# Patient Record
Sex: FEMALE | Race: WHITE | NOT HISPANIC OR LATINO | Employment: FULL TIME | URBAN - METROPOLITAN AREA
[De-identification: names, ages, dates, MRNs, and addresses within clinical notes are randomized per-mention and may not be internally consistent; named-entity substitution may affect disease eponyms.]

---

## 2017-03-01 ENCOUNTER — OFFICE VISIT (OUTPATIENT)
Dept: FAMILY MEDICINE | Facility: CLINIC | Age: 22
End: 2017-03-01
Payer: COMMERCIAL

## 2017-03-01 VITALS
BODY MASS INDEX: 24.37 KG/M2 | HEART RATE: 96 BPM | WEIGHT: 142 LBS | TEMPERATURE: 98.9 F | OXYGEN SATURATION: 99 % | DIASTOLIC BLOOD PRESSURE: 77 MMHG | SYSTOLIC BLOOD PRESSURE: 131 MMHG | RESPIRATION RATE: 16 BRPM

## 2017-03-01 DIAGNOSIS — R19.7 VOMITING AND DIARRHEA: ICD-10-CM

## 2017-03-01 DIAGNOSIS — R10.31 ABDOMINAL PAIN, RIGHT LOWER QUADRANT: Primary | ICD-10-CM

## 2017-03-01 DIAGNOSIS — R11.10 VOMITING AND DIARRHEA: ICD-10-CM

## 2017-03-01 PROCEDURE — 99214 OFFICE O/P EST MOD 30 MIN: CPT | Performed by: FAMILY MEDICINE

## 2017-03-01 ASSESSMENT — PAIN SCALES - GENERAL: PAINLEVEL: MILD PAIN (3)

## 2017-03-01 NOTE — MR AVS SNAPSHOT
"              After Visit Summary   3/1/2017    Maria Del Rosario Barger    MRN: [id number]           Patient Information     Date Of Birth          1995        Visit Information        Provider Department      3/1/2017 1:00 PM Gabriela Stephen MD [facility]        Today's Diagnoses     Abdominal pain, right lower quadrant    -  1    Vomiting and diarrhea           Follow-ups after your visit        Who to contact     If you have questions or need follow up information about today's clinic visit or your schedule please contact [facility] directly at [phone].  Normal or non-critical lab and imaging results will be communicated to you by gis.tohart, letter or phone within 4 business days after the clinic has received the results. If you do not hear from us within 7 days, please contact the clinic through Flexenclosuret or phone. If you have a critical or abnormal lab result, we will notify you by phone as soon as possible.  Submit refill requests through T[employer] or call your pharmacy and they will forward the refill request to us. Please allow 3 business days for your refill to be completed.          Additional Information About Your Visit        MyChart Information     [employer] lets you send messages to your doctor, view your test results, renew your prescriptions, schedule appointments and more. To sign up, go to [email or web address] . Click on \"Log in\" on the left side of the screen, which will take you to the Welcome page. Then click on \"Sign up Now\" on the right side of the page.     You will be asked to enter the access code listed below, as well as some personal information. Please follow the directions to create your username and password.     Your access code is: [email or web address]  Expires: 2017  9:37 AM     Your access code will  in 90 days. If you need help or a new code, please call your [facility] or [phone].        Care EveryWhere ID     This is your Care " EveryWhere ID. This could be used by other organizations to access your [place] medical records  [id number]        Your Vitals Were     Pulse Temperature Respirations Pulse Oximetry Breastfeeding? BMI (Body Mass Index)    96 98.9  F (37.2  C) (Oral) 16 99% No 24.37 kg/m2       Blood Pressure from Last 3 Encounters:   03/01/17 131/77   11/14/16 125/80   11/07/16 126/70    Weight from Last 3 Encounters:   03/01/17 142 lb (64.4 kg)   11/25/16 155 lb (70.3 kg)   11/14/16 155 lb (70.3 kg)              Today, you had the following     No orders found for display       Primary Care Provider Office Phone # Fax #    [facility] [phone] [address] [facility] [address]        Thank you!     Thank you for choosing [facility]  for your care. Our goal is always to provide you with excellent care. Hearing back from our patients is one way we can continue to improve our services. Please take a few minutes to complete the written survey that you may receive in the mail after your visit with us. Thank you!             Your Updated Medication List - Protect others around you: Learn how to safely use, store and throw away your medicines at www.disposemymeds.org.          This list is accurate as of: 3/1/17  2:26 PM.  Always use your most recent med list.                   Brand Name Dispense Instructions for use    * albuterol 108 (90 BASE) MCG/ACT Inhaler    PROAIR HFA/PROVENTIL HFA/VENTOLIN HFA    1 Inhaler    Inhale 2 puffs into the lungs every 4 hours as needed for shortness of breath / dyspnea or wheezing Use with spacer       * albuterol 108 (90 BASE) MCG/ACT Inhaler    PROAIR HFA/PROVENTIL HFA/VENTOLIN HFA    1 Inhaler    Inhale 2 puffs into the lungs every 4 hours as needed       * Notice:  This list has 2 medication(s) that are the same as other medications prescribed for you. Read the directions carefully, and ask your doctor or other care provider to review them  with you.

## 2017-03-01 NOTE — PROGRESS NOTES
SUBJECTIVE:                                                    Maria Del Rosario Barger is a 21 year old female who presents to clinic today for the following health issues:      Abdominal Pain      Duration: 2-3 days    Description (location/character/radiation): upper abdominal/ nausea and cramping       Associated flank pain: NO    Intensity:  mild    Accompanying signs and symptoms:        Fever/Chills: YES       Gas/Bloating: YES       Nausea/vomitting: YES       Diarrhea: YES       Dysuria or Hematuria: no     History (previous similar pain/trauma/previous testing): yes, same sx about 3 weeks ago    Precipitating or alleviating factors:       Pain worse with eating/BM/urination: none       Pain relieved by BM: no     Therapies tried and outcome: None    LMP:  Irregular periods unsure. Not on birth control.      Patient has had her gallbladder out and since then would have some occasional looses stools.  Twice in the last 3 weeks has had episodes of fever and bad upper abdominal pain cramps and bloating. Patient would then lay in bed and have no energy.  For the first 12 hours would have fever chills and just resting and the whole next day would be either throwing up or having bad diarrhea.  The pain would last for a couple of days then symptoms resolved.    This second bout started last night had a fever 101.4 had chills body aches had to leave work early  Laid down in bed had severe upper abdominal pain. Was freezing had to take ibuprofen  Patient was able to sleep but yesterday still had abdominal pain then later that day started having diarrhea all afternoon every hour all afternoon and vomiting (3x)   Last episode of diarrhea was today at 11:30 am today symptoms are slowing down somewhat. Haven't thrown up today.  Bloody Diarrhea: No  Recent antibiotic use:No  Recent travel:No  Known ill contacts or exposure to c.diff:No  Eating any raw or undercooked foods:No  Lightheadedness, syncope, or presyncope:No    No  chest pain or shortness of breath or neck stiffness    Problem list and histories reviewed & adjusted, as indicated.  Additional history: as documented    Problem list, Medication list, Allergies, and Medical/Social/Surgical histories reviewed in [facility] and updated as appropriate.    ROS:  Constitutional, HEENT, cardiovascular, pulmonary, gi and gu systems are negative, except as otherwise noted.    OBJECTIVE:                                                    /77  Pulse 96  Temp 98.9  F (37.2  C) (Oral)  Resp 16  Wt 142 lb (64.4 kg)  SpO2 99%  Breastfeeding? No  BMI 24.37 kg/m2  Body mass index is 24.37 kg/(m^2).  Orthostatic: No  GENERAL: healthy, alert and no distress  NECK: no adenopathy, no asymmetry, masses, or scars and thyroid normal to palpation  RESP: lungs clear to auscultation - no rales, rhonchi or wheezes  CV: regular rate and rhythm, normal S1 S2, no S3 or S4, no murmur, click or rub, no peripheral edema and peripheral pulses strong  ABDOMEN: soft BUT VERY TENDER POSITIVE right lower quadrant  abdominal tenderness,  no hepatosplenomegaly, no masses and bowel sounds normal  MS: no gross musculoskeletal defects noted, no edema    Diagnostic Test Results:  No results found for this or any previous visit (from the past 24 hour(s)).     ASSESSMENT/PLAN:                                                        ICD-10-CM    1. Abdominal pain, right lower quadrant R10.31    2. Vomiting and diarrhea R11.10     R19.7        Patient was exquisitely tender and complained of severe pain and tenderness on palpating right lower quadrant  Recommend ER evaluation.  Transferred to ER for further evaluation and management because of right lower quadrant abdominal pain.   Patient plans to go to [facility] but not right away.  Discussed risks of acute abdomen and perforation.  Patient was recommended ambulance transfer, patient declined        MD Gabriela Saez MD  [facility]  [place]

## 2017-03-01 NOTE — NURSING NOTE
"Chief Complaint   Patient presents with     Abdominal Pain     c/o abdominal pain, vomiting and diarrghea x 3 days       Initial /77  Pulse 96  Temp 98.9  F (37.2  C) (Oral)  Resp 16  Wt 142 lb (64.4 kg)  SpO2 99%  Breastfeeding? No  BMI 24.37 kg/m2 Estimated body mass index is 24.37 kg/(m^2) as calculated from the following:    Height as of 11/25/16: 5' 4\" (1.626 m).    Weight as of this encounter: 142 lb (64.4 kg).  Medication Reconciliation: complete   Winnie Gill MA      "

## 2017-08-29 ENCOUNTER — OFFICE VISIT (OUTPATIENT)
Dept: OBGYN | Facility: CLINIC | Age: 22
End: 2017-08-29
Payer: COMMERCIAL

## 2017-08-29 VITALS
HEART RATE: 82 BPM | TEMPERATURE: 98.1 F | HEIGHT: 63 IN | BODY MASS INDEX: 25.69 KG/M2 | WEIGHT: 145 LBS | SYSTOLIC BLOOD PRESSURE: 125 MMHG | DIASTOLIC BLOOD PRESSURE: 78 MMHG

## 2017-08-29 DIAGNOSIS — Z11.3 SCREENING EXAMINATION FOR VENEREAL DISEASE: ICD-10-CM

## 2017-08-29 DIAGNOSIS — N89.8 VAGINAL DISCHARGE: ICD-10-CM

## 2017-08-29 DIAGNOSIS — Z01.419 ENCOUNTER FOR GYNECOLOGICAL EXAMINATION WITHOUT ABNORMAL FINDING: Primary | ICD-10-CM

## 2017-08-29 LAB
SPECIMEN SOURCE: NORMAL
WET PREP SPEC: NORMAL

## 2017-08-29 PROCEDURE — 87591 N.GONORRHOEAE DNA AMP PROB: CPT | Performed by: NURSE PRACTITIONER

## 2017-08-29 PROCEDURE — 87210 SMEAR WET MOUNT SALINE/INK: CPT | Performed by: NURSE PRACTITIONER

## 2017-08-29 PROCEDURE — 99395 PREV VISIT EST AGE 18-39: CPT | Performed by: NURSE PRACTITIONER

## 2017-08-29 PROCEDURE — 87491 CHLMYD TRACH DNA AMP PROBE: CPT | Performed by: NURSE PRACTITIONER

## 2017-08-29 ASSESSMENT — PAIN SCALES - GENERAL: PAINLEVEL: NO PAIN (0)

## 2017-08-29 NOTE — LETTER
[facility]  [address] [facility] [address]  Phone: [phone]    08/30/17    Maria Del Rosario Barger  [address]   [place] [address]              Maria Del Rosario,     Your labs are negative for infection. Please follow up as needed.     Sincerely,     Lydia Lucio CNP

## 2017-08-29 NOTE — MR AVS SNAPSHOT
"              After Visit Summary   2017    Maria Del Rosario Barger    MRN: [id number]           Patient Information     Date Of Birth          1995        Visit Information        Provider Department      2017 10:50 AM Lydia Lucio APRN CNP [facility]        Today's Diagnoses     Encounter for gynecological examination without abnormal finding    -  1    Screening examination for venereal disease        Vaginal discharge           Follow-ups after your visit        Who to contact     If you have questions or need follow up information about today's clinic visit or your schedule please contact [facility] directly at [phone].  Normal or non-critical lab and imaging results will be communicated to you by [facility], letter or phone within 4 business days after the clinic has received the results. If you do not hear from us within 7 days, please contact the clinic through [employer] or phone. If you have a critical or abnormal lab result, we will notify you by phone as soon as possible.  Submit refill requests through [employer] or call your pharmacy and they will forward the refill request to us. Please allow 3 business days for your refill to be completed.          Additional Information About Your Visit        MyChart Information     [employer] lets you send messages to your doctor, view your test results, renew your prescriptions, schedule appointments and more. To sign up, go to [email or web address] . Click on \"Log in\" on the left side of the screen, which will take you to the Welcome page. Then click on \"Sign up Now\" on the right side of the page.     You will be asked to enter the access code listed below, as well as some personal information. Please follow the directions to create your username and password.     Your access code is: [email or web address]  Expires: 2017 11:31 AM     Your access code will  in 90 days. If you need help or a new code, please call your " "[facility] or [phone].        Care EveryWhere ID     This is your Care EveryWhere ID. This could be used by other organizations to access your [place] medical records  [id number]        Your Vitals Were     Pulse Temperature Height Last Period BMI (Body Mass Index)       82 98.1  F (36.7  C) (Oral) 5' 3.25\" (1.607 m) 07/31/2017 (Approximate) 25.48 kg/m2        Blood Pressure from Last 3 Encounters:   08/29/17 125/78   03/01/17 131/77   11/14/16 125/80    Weight from Last 3 Encounters:   08/29/17 145 lb (65.8 kg)   03/01/17 142 lb (64.4 kg)   11/25/16 155 lb (70.3 kg)              We Performed the Following     Chlamydia trachomatis PCR     Neisseria gonorrhoeae PCR     Wet prep          Today's Medication Changes          These changes are accurate as of: 8/29/17 11:31 AM.  If you have any questions, ask your nurse or doctor.               These medicines have changed or have updated prescriptions.        Dose/Directions    albuterol 108 (90 BASE) MCG/ACT Inhaler   Commonly known as:  PROAIR HFA/PROVENTIL HFA/VENTOLIN HFA   This may have changed:  Another medication with the same name was removed. Continue taking this medication, and follow the directions you see here.   Used for:  Acute bronchitis, unspecified organism   Changed by:  Gabriela Stephen MD        Dose:  2 puff   Inhale 2 puffs into the lungs every 4 hours as needed for shortness of breath / dyspnea or wheezing Use with spacer   Quantity:  1 Inhaler   Refills:  0                Primary Care Provider Office Phone # Fax #    [facility] [phone] [address] [facility] [facility] [address]        Equal Access to Services     RM ALFORD AH: Yuval Thapa, mary lutatianaadaha, qajeffta kaalmada dena, faith hamm. So [facility] [phone].    ATENCIÓN: Si habla español, tiene a coleman disposición servicios gratuitos de asistencia lingüística. Llame al [phone].    We " comply with applicable federal civil rights laws and [place] laws. We do not discriminate on the basis of race, color, national origin, age, disability sex, sexual orientation or gender identity.            Thank you!     Thank you for choosing [facility]  [facility]. Our goal is always to provide you with excellent care. Hearing back from our patients is one way we can continue to improve our services. Please take a few minutes to complete the written survey that you may receive in the mail after your visit with us. Thank you!             Your Updated Medication List - Protect others around you: Learn how to safely use, store and throw away your medicines at www.disposemymeds.org.          This list is accurate as of: 8/29/17 11:31 AM.  Always use your most recent med list.                   Brand Name Dispense Instructions for use Diagnosis    albuterol 108 (90 BASE) MCG/ACT Inhaler    PROAIR HFA/PROVENTIL HFA/VENTOLIN HFA    1 Inhaler    Inhale 2 puffs into the lungs every 4 hours as needed for shortness of breath / dyspnea or wheezing Use with spacer    Acute bronchitis, unspecified organism

## 2017-08-29 NOTE — NURSING NOTE
"Chief Complaint   Patient presents with     Physical       Initial /78  Pulse 82  Temp 98.1  F (36.7  C) (Oral)  Ht 5' 3.25\" (1.607 m)  Wt 145 lb (65.8 kg)  LMP 07/31/2017 (Approximate)  BMI 25.48 kg/m2 Estimated body mass index is 25.48 kg/(m^2) as calculated from the following:    Height as of this encounter: 5' 3.25\" (1.607 m).    Weight as of this encounter: 145 lb (65.8 kg)..  BP completed using cuff size: franki Damon CMA      "

## 2017-08-29 NOTE — PROGRESS NOTES
S: Pt is a 22 year old  2 para 1 who presents today for an annual female exam. LMP: 17. Contraception: none. Requests STD screening. Last Pap smear: 2016 and was NIL. Immunizations reviewed. Dental Exams: regular. Diet and calcium reviewed. Exercise: active.     Past Medical History:   Diagnosis Date     NO ACTIVE PROBLEMS      Past Surgical History:   Procedure Laterality Date     CHOLECYSTOSTOMY  2016     COMBINED ESOPHAGOSCOPY, GASTROSCOPY, DUODENOSCOPY (EGD) WITH CO2 INSUFFLATION Bilateral 2016    Procedure: COMBINED ESOPHAGOSCOPY, GASTROSCOPY, DUODENOSCOPY (EGD) WITH CO2 INSUFFLATION;  Surgeon: Warren Fernandez MD;  Location: [facility] OR     ESOPHAGOSCOPY, GASTROSCOPY, DUODENOSCOPY (EGD), COMBINED N/A 2016    Procedure: COMBINED ESOPHAGOSCOPY, GASTROSCOPY, DUODENOSCOPY (EGD), BIOPSY SINGLE OR MULTIPLE;  Surgeon: Warren Fernandez MD;  Location: [facility] OR     TONSILLECTOMY       trigger thumb       Social History   Substance Use Topics     Smoking status: Current Every Day Smoker     Packs/day: 1.00     Types: Cigarettes     Smokeless tobacco: Never Used     Alcohol use Yes      Comment: occasional          REVIEW OF SYSTEMS:  CONSTITUTIONAL:NEGATIVE for fever, chills, change in weight  EYES: NEGATIVE for vision changes or irritation  ENT/MOUTH: NEGATIVE for ear, mouth and throat problems  RESP:NEGATIVE for significant cough or SOB  CV: NEGATIVE for chest pain, palpitations or peripheral edema  GI: NEGATIVE for nausea, abdominal pain, heartburn, or change in bowel habits  Periods are regular, Cyclic symptoms include none. No intermenstrual bleeding. Complains of a thick white vaginal discharge x 3 weeks. No itching, odor, abnormal urinary symptoms.   MUSCULOSKELATAL:NEGATIVE for significant arthralgias or myalgia  INTEGUMENTARY/SKIN: NEGATIVE for worrisome rashes, moles or lesions  NEURO: NEGATIVE for weakness, dizziness or paresthesias  ENDOCRINE: NEGATIVE for temperature  intolerance, skin/hair changes  HEME/ALLERGY/IMMUNE: NEGATIVE for bleeding problems  PSYCHIATRIC: NEGATIVE for changes in mood or affect      OBJECTIVE: This is a well appearing female in no acute distress. Answers questions and maintains eye contact appropriately. Vital signs noted.     EXAM:  EYES: Eyes grossly normal to inspection, PERRL and conjunctivae and sclerae normal  HENT: ear canals and TM's normal and nose and mouth without ulcers or lesions  NECK: no adenopathy, no asymmetry, masses, or scars and thyroid normal to palpation  RESP: lungs clear to auscultation - no rales, rhonchi or wheezes  CV: regular rates and rhythm, normal S1 S2, no S3 or S4 and no murmur, click or rub  LYMPH: normal ant/post cervical and supraclavicular nodes  ABD/GI: soft, nontender, without hepatosplenomegaly or masses  MS: extremities normal- no gross deformities noted  SKIN: no suspicious lesions or rashes  NEURO: Normal strength and tone, mentation intact and speech normal  PSYCH: mentation appears normal and affect normal/bright  Breast exam: Breasts are symmetrical without masses, lymphadenopathy, retraction, dimpling, or nipple discharge bilaterally.  Pelvic Exam: External genitalia without visible lesions or discharge. Normal BUS. Vaginal mucosa pink, rugated, moist, without lesions or discharge. Cervix is pink, multiparous, midline, without cervical motion tenderness. Pap smear is not obtained. Uterus normal size and shape without tenderness or masses. Adnexa without masses or tenderness bilaterally.  Wet prep negative    A/P:  1) Normal annual female exam. Health maintenance updated. Regular physical activity and healthy diet encouraged. Continue regular dental exams. Encouraged adequate calcium intake. New ACOG guidelines regarding cervical cancer screening discussed with patient. Discussed rationale for not doing pap smear annually as she is not high risk. Based on last pap smear, she is not due for pap smear this year.    2) STD screening. Labs obtained and we will notify patient of results when available.    Lydia LYNNE CNP

## 2017-08-30 LAB
C TRACH DNA SPEC QL NAA+PROBE: NEGATIVE
N GONORRHOEA DNA SPEC QL NAA+PROBE: NEGATIVE
SPECIMEN SOURCE: NORMAL
SPECIMEN SOURCE: NORMAL

## 2017-10-13 ENCOUNTER — OFFICE VISIT (OUTPATIENT)
Dept: FAMILY MEDICINE | Facility: CLINIC | Age: 22
End: 2017-10-13
Payer: COMMERCIAL

## 2017-10-13 VITALS
OXYGEN SATURATION: 98 % | SYSTOLIC BLOOD PRESSURE: 122 MMHG | HEART RATE: 89 BPM | DIASTOLIC BLOOD PRESSURE: 78 MMHG | WEIGHT: 145 LBS | TEMPERATURE: 97.7 F | BODY MASS INDEX: 25.48 KG/M2 | RESPIRATION RATE: 15 BRPM

## 2017-10-13 DIAGNOSIS — G44.59 OTHER COMPLICATED HEADACHE SYNDROME: Primary | ICD-10-CM

## 2017-10-13 DIAGNOSIS — H53.9 VISION CHANGES: ICD-10-CM

## 2017-10-13 PROCEDURE — 99215 OFFICE O/P EST HI 40 MIN: CPT | Performed by: PHYSICIAN ASSISTANT

## 2017-10-13 RX ORDER — PREDNISOLONE ACETATE 10 MG/ML
SUSPENSION/ DROPS OPHTHALMIC
Refills: 0 | COMMUNITY
Start: 2017-09-29 | End: 2018-04-26

## 2017-10-13 RX ORDER — LOTEPREDNOL ETABONATE 5 MG/ML
SUSPENSION/ DROPS OPHTHALMIC
Refills: 0 | COMMUNITY
Start: 2017-08-14 | End: 2018-04-26

## 2017-10-13 ASSESSMENT — ENCOUNTER SYMPTOMS
GASTROINTESTINAL NEGATIVE: 1
RESPIRATORY NEGATIVE: 1
MUSCULOSKELETAL NEGATIVE: 1
CARDIOVASCULAR NEGATIVE: 1
PSYCHIATRIC NEGATIVE: 1
HEMATOLOGIC/LYMPHATIC NEGATIVE: 1
CONSTITUTIONAL NEGATIVE: 1

## 2017-10-13 NOTE — NURSING NOTE
"Chief Complaint   Patient presents with     Conjunctivitis     pt was treat for an infectio in her eye since August, she c/o vision changes and headache       Initial /78  Pulse 89  Temp 97.7  F (36.5  C) (Oral)  Resp 15  Wt 145 lb (65.8 kg)  SpO2 98%  BMI 25.48 kg/m2 Estimated body mass index is 25.48 kg/(m^2) as calculated from the following:    Height as of 8/29/17: 5' 3.25\" (1.607 m).    Weight as of this encounter: 145 lb (65.8 kg).  Medication Reconciliation: complete   Winnie Gill MA    "

## 2017-10-13 NOTE — Clinical Note
These are hard. Agree with imaging-- emergent if abnormal neuro exam or worst headache of life I'll often try to abort headache with dose of narcotic plus reglan, go home and sleep-- with ct the next day if no emergent signs, close follow up with pcp vs neurology Sonia

## 2017-10-13 NOTE — MR AVS SNAPSHOT
"              After Visit Summary   10/13/2017    Maria Del Rosario Barger    MRN: [id number]           Patient Information     Date Of Birth          1995        Visit Information        Provider Department      10/13/2017 12:20 PM Chayo Barnes PA-C [facility]        Today's Diagnoses     Other complicated headache syndrome    -  1    Vision changes           Follow-ups after your visit        Who to contact     If you have questions or need follow up information about today's clinic visit or your schedule please contact [facility] directly at [phone].  Normal or non-critical lab and imaging results will be communicated to you by Bufferhart, letter or phone within 4 business days after the clinic has received the results. If you do not hear from us within 7 days, please contact the clinic through [employer] or phone. If you have a critical or abnormal lab result, we will notify you by phone as soon as possible.  Submit refill requests through [employer] or call your pharmacy and they will forward the refill request to us. Please allow 3 business days for your refill to be completed.          Additional Information About Your Visit        MyChart Information     [employer] lets you send messages to your doctor, view your test results, renew your prescriptions, schedule appointments and more. To sign up, go to [email or web address] . Click on \"Log in\" on the left side of the screen, which will take you to the Welcome page. Then click on \"Sign up Now\" on the right side of the page.     You will be asked to enter the access code listed below, as well as some personal information. Please follow the directions to create your username and password.     Your access code is: [email or web address]  Expires: 2017 11:31 AM     Your access code will  in 90 days. If you need help or a new code, please call your [facility] or [phone].        Care EveryWhere ID     This is your Care " EveryWhere ID. This could be used by other organizations to access your [place] medical records  [id number]        Your Vitals Were     Pulse Temperature Respirations Pulse Oximetry BMI (Body Mass Index)       89 97.7  F (36.5  C) (Oral) 15 98% 25.48 kg/m2        Blood Pressure from Last 3 Encounters:   10/13/17 122/78   08/29/17 125/78   03/01/17 131/77    Weight from Last 3 Encounters:   10/13/17 145 lb (65.8 kg)   08/29/17 145 lb (65.8 kg)   03/01/17 142 lb (64.4 kg)              Today, you had the following     No orders found for display       Primary Care Provider    Physician No Ref-Primary       NO REF-PRIMARY PHYSICIAN        Equal Access to Services     RM ALFORD : Hadii cesario jaino Elier, waaxda luqadaha, qaybta kaalmada adekiayada, faith kelsey . So [facility] [phone].    ATENCIÓN: Si habla español, tiene a coleman disposición servicios gratuitos de asistencia lingüística. Llame al [phone].    We comply with applicable federal civil rights laws and [place] laws. We do not discriminate on the basis of race, color, national origin, age, disability, sex, sexual orientation, or gender identity.            Thank you!     Thank you for choosing [facility]  [facility]. Our goal is always to provide you with excellent care. Hearing back from our patients is one way we can continue to improve our services. Please take a few minutes to complete the written survey that you may receive in the mail after your visit with us. Thank you!             Your Updated Medication List - Protect others around you: Learn how to safely use, store and throw away your medicines at www.disposemymeds.org.          This list is accurate as of: 10/13/17  3:25 PM.  Always use your most recent med list.                   Brand Name Dispense Instructions for use Diagnosis    albuterol 108 (90 BASE) MCG/ACT Inhaler    PROAIR HFA/PROVENTIL HFA/VENTOLIN HFA    1 Inhaler    Inhale 2  puffs into the lungs every 4 hours as needed for shortness of breath / dyspnea or wheezing Use with spacer    Acute bronchitis, unspecified organism       LOTEMAX 0.5 % ophthalmic susp   Generic drug:  loteprednol      SHAKE LQ AND INT 1 GTT IN OD QID        prednisoLONE acetate 1 % ophthalmic susp    PRED FORTE     USE ONE DROP INTO THE RIGHT EYE Q 2 H UTD. SHAKE VIGEROUSLY BEFORE USING

## 2017-10-13 NOTE — NURSING NOTE
VISION   No corrective lenses  Tool used: Qasim   Right eye:        10/20 (20/40)    Left eye:          10/10 (20/20)  Visual Acuity: REFER

## 2017-10-13 NOTE — Clinical Note
Lynette.  Would you do anything different for her?  I feel like the imaging is urgent with her family history and new severe headache.   Chayo Barnes PA-C

## 2017-10-13 NOTE — PROGRESS NOTES
SUBJECTIVE:   Maria Del Rosario Barger is a 22 year old female who presents to clinic today for the following health issues:      Eye Problem  Duration of complaint: August, pain, swelling and redness, patient now c/o vision changes and headaches   Headache for the past 4 days  She feels dizzy and nauseated  Ibuprofen helped initially, but now it doesn't work  Her vision has been continuing to get worse since August - when she had an eye infection  She has had an extensive workup for the eye infection with [facility]  Next step is a antibiotic drop  No new numbness, tingling or weakness  She feels tired and exhausted  No fever  She denies OCP use or other hormone use  This is the worst headache she has ever had  She denies cough, sore throat.  She smokes.  She has a runny nose on and off.   No history of blood clot.  Grandmother had a blood clot in her 50's - has Factor V.  Brother with Von Willebrand.  Patient has never had a hematologist workup     Problem list and histories reviewed & adjusted, as indicated.  Additional history: as documented    Patient Active Problem List   Diagnosis     Menometrorrhagia     Adjustment disorder with anxious mood     Insomnia, unspecified insomnia     KEISHA (generalized anxiety disorder)     Past Surgical History:   Procedure Laterality Date     CHOLECYSTOSTOMY  07/2016     COMBINED ESOPHAGOSCOPY, GASTROSCOPY, DUODENOSCOPY (EGD) WITH CO2 INSUFFLATION Bilateral 7/25/2016    Procedure: COMBINED ESOPHAGOSCOPY, GASTROSCOPY, DUODENOSCOPY (EGD) WITH CO2 INSUFFLATION;  Surgeon: Warren Fernandez MD;  Location:  OR     ESOPHAGOSCOPY, GASTROSCOPY, DUODENOSCOPY (EGD), COMBINED N/A 7/25/2016    Procedure: COMBINED ESOPHAGOSCOPY, GASTROSCOPY, DUODENOSCOPY (EGD), BIOPSY SINGLE OR MULTIPLE;  Surgeon: Warren Fernandez MD;  Location: [facility] OR     TONSILLECTOMY       trigger thumb         Social History   Substance Use Topics     Smoking status: Current Every Day Smoker     Packs/day:  1.00     Types: Cigarettes     Smokeless tobacco: Never Used     Alcohol use Yes      Comment: occasional      History reviewed. No pertinent family history.      Current Outpatient Prescriptions   Medication Sig Dispense Refill     prednisoLONE acetate (PRED FORTE) 1 % ophthalmic susp USE ONE DROP INTO THE RIGHT EYE Q 2 H UTD. SHAKE VIGEROUSLY BEFORE USING  0     LOTEMAX 0.5 % ophthalmic susp SHAKE LQ AND INT 1 GTT IN OD QID  0     albuterol (PROAIR HFA, PROVENTIL HFA, VENTOLIN HFA) 108 (90 BASE) MCG/ACT inhaler Inhale 2 puffs into the lungs every 4 hours as needed for shortness of breath / dyspnea or wheezing Use with spacer 1 Inhaler 0     Allergies   Allergen Reactions     Sulfa Drugs      Tobramycin Swelling     Eyes swelled     Topamax [Topiramate]          Reviewed and updated as needed this visit by clinical staff     Reviewed and updated as needed this visit by Provider         Review of Systems   Constitutional: Negative.    HENT: Negative.    Eyes:        As in HPI   Respiratory: Negative.    Cardiovascular: Negative.    Gastrointestinal: Negative.    Genitourinary: Negative.    Musculoskeletal: Negative.    Skin: Negative.    Neurological:        As in HPI   Hematological: Negative.    Psychiatric/Behavioral: Negative.          OBJECTIVE:     /78  Pulse 89  Temp 97.7  F (36.5  C) (Oral)  Resp 15  Wt 145 lb (65.8 kg)  SpO2 98%  BMI 25.48 kg/m2  Body mass index is 25.48 kg/(m^2).  GENERAL: healthy, alert and no distress  NECK: no adenopathy, no asymmetry, masses  RESP: lungs clear to auscultation - no rales, rhonchi or wheezes  CV: regular rate and rhythm, normal S1 S2, no murmur  MS: no gross musculoskeletal defects noted, no edema  SKIN: no suspicious lesions or rashes    Cranial Nerves:  EYES: Normal, No nystagmus, EOM, PERRL  Normal face sensation  Normal corneal reflex  Normal masseter / temporalis  Normal face strength and symmetry  Normal hearing  Normal swallowing  Uvula midline  Full  trapezius / sternocleidomastoid strength  Normal tongue protrusion    Neurologic Exam:  Gait: Normal. Intact toe-heel walk  Strength: 5/5 deltoid, biceps, triceps, , hip flexion, knee flexion, dorsiflexion, plantarflexion  Visual fields intact: right, left and bilateral (slightly narrowed on the right)  Pronator drift: negative  Rapid hand movements intact  Finger to nose intact  DTR equal bilaterally: biceps, triceps, brachioradialis, patellar, achilles  Negative clonus  Sensation intact toes and shoulders    Diagnostic Test Results:  none     ASSESSMENT/PLAN:     1. Other complicated headache syndrome  2. Vision changes    Patient was discussed with ED provider at [facility] and sent to the ED for imaging.  Concern for a possible thrombosis with her history and family history significant for clotting disorders.  She will go to [facility] ED today for further workup.     Chayo Barnes PA-C  [facility]

## 2017-10-20 NOTE — PROGRESS NOTES
Chart reviewed.  Encounter was not reviewed with provider.  Patient was not examined by me.  Cris Mcguire MD

## 2018-02-06 ENCOUNTER — NURSE TRIAGE (OUTPATIENT)
Dept: NURSING | Facility: CLINIC | Age: 23
End: 2018-02-06

## 2018-02-06 NOTE — TELEPHONE ENCOUNTER
"Caller reports having lower abdominal pain for 2 days. Pain is below her belly button and is stronger on the left side than the right. Had temp of 100.2 last evening. No fever tonight. Reports having no appetite. Rates her abdominal pain 5-6/10. Said she does not keep track of her menstrual periods and cannot say if her menstrual period is late. Reports hx of ovarian cysts. Nurse advised patient to go to the ER now for evaluation.    Protocol and care advice reviewed.   Caller states understanding of the recommended disposition.  Advised to call back if further questions or concerns.     Liliana Cruz RN/FNA    Reason for Disposition    [1] MILD-MODERATE pain AND [2] constant AND [3] present > 2 hours    Additional Information    Negative: Shock suspected (e.g., cold/pale/clammy skin, too weak to stand, low BP, rapid pulse)    Negative: Difficult to awaken or acting confused  (e.g., disoriented, slurred speech)    Negative: Passed out (i.e., lost consciousness, collapsed and was not responding)    Negative: Sounds like a life-threatening emergency to the triager    Negative: Chest pain    Negative: Pain is mainly in upper abdomen  (if needed ask: \"is it mainly above the belly button?\")    Negative: Followed an abdomen (stomach) injury    Negative: [1] Abdominal pain AND [2] pregnant < 20 weeks    Negative: [1] Abdominal pain AND [2] pregnant > 20 weeks    Negative: [1] Abdominal pain AND [2] postpartum < 1 month since delivery    Negative: [1] SEVERE pain (e.g., excruciating) AND [2] present > 1 hour    Negative: [1] SEVERE pain AND [2] age > 60    Negative: [1] Vomiting AND [2] contains red blood or black (\"coffee ground\") material  (Exception: few red streaks in vomit that only happened once)    Negative: Blood in bowel movements   (Exception: blood on surface of BM with constipation)    Negative: Black or tarry bowel movements  (Exception: chronic-unchanged  black-grey bowel movements AND is taking iron pills or " Pepto-bismol)    Negative: Patient sounds very sick or weak to the triager    Protocols used: ABDOMINAL PAIN - FEMALE-ADULT-

## 2018-04-18 ENCOUNTER — OFFICE VISIT (OUTPATIENT)
Dept: FAMILY MEDICINE | Facility: CLINIC | Age: 23
End: 2018-04-18
Payer: COMMERCIAL

## 2018-04-18 VITALS
DIASTOLIC BLOOD PRESSURE: 75 MMHG | TEMPERATURE: 98.1 F | SYSTOLIC BLOOD PRESSURE: 128 MMHG | OXYGEN SATURATION: 98 % | HEART RATE: 113 BPM | RESPIRATION RATE: 16 BRPM | WEIGHT: 142 LBS | BODY MASS INDEX: 24.96 KG/M2

## 2018-04-18 DIAGNOSIS — N91.2 AMENORRHEA: Primary | ICD-10-CM

## 2018-04-18 LAB
BETA HCG QUAL IFA URINE: NEGATIVE
HCG SERPL QL: NEGATIVE

## 2018-04-18 PROCEDURE — 99213 OFFICE O/P EST LOW 20 MIN: CPT | Performed by: FAMILY MEDICINE

## 2018-04-18 PROCEDURE — 84703 CHORIONIC GONADOTROPIN ASSAY: CPT | Performed by: FAMILY MEDICINE

## 2018-04-18 NOTE — MR AVS SNAPSHOT
"              After Visit Summary   2018    Maria Del Rosario Barger    MRN: [id number]           Patient Information     Date Of Birth          1995        Visit Information        Provider Department      2018 9:40 AM Ernst Loo MD [facility]        Today's Diagnoses     Amenorrhea    -  1       Follow-ups after your visit        Who to contact     If you have questions or need follow up information about today's clinic visit or your schedule please contact [facility] directly at [phone].  Normal or non-critical lab and imaging results will be communicated to you by MyChart, letter or phone within 4 business days after the clinic has received the results. If you do not hear from us within 7 days, please contact the clinic through [employer] or phone. If you have a critical or abnormal lab result, we will notify you by phone as soon as possible.  Submit refill requests through [employer] or call your pharmacy and they will forward the refill request to us. Please allow 3 business days for your refill to be completed.          Additional Information About Your Visit        MyChart Information     [employer] lets you send messages to your doctor, view your test results, renew your prescriptions, schedule appointments and more. To sign up, go to [email or web address] . Click on \"Log in\" on the left side of the screen, which will take you to the Welcome page. Then click on \"Sign up Now\" on the right side of the page.     You will be asked to enter the access code listed below, as well as some personal information. Please follow the directions to create your username and password.     Your access code is: [email or web address]  Expires: 2018 10:55 AM     Your access code will  in 90 days. If you need help or a new code, please call your [facility] or [phone].        Care EveryWhere ID     This is your Care EveryWhere ID. This could be used by other organizations to " access your [facility] medical records  [id number]        Your Vitals Were     Pulse Temperature Respirations Last Period Pulse Oximetry BMI (Body Mass Index)    113 98.1  F (36.7  C) (Oral) 16 03/10/2018 98% 24.96 kg/m2       Blood Pressure from Last 3 Encounters:   04/18/18 128/75   10/13/17 122/78   08/29/17 125/78    Weight from Last 3 Encounters:   04/18/18 142 lb (64.4 kg)   10/13/17 145 lb (65.8 kg)   08/29/17 145 lb (65.8 kg)              We Performed the Following     Beta HCG Qual, Urine - [facility] [place] ([id number])     HCG qualitative        Primary Care Provider Office Phone # Fax #    [facility] [phone] [address] [facility] [address]        Equal Access to Services     RM ALFORD : Hadii cesario mesa hadasho Soomaali, waaxda luqadaha, qaybta kaalmada adeegyada, fatih amatoin haylaureen kelsey . So [facility] [phone].    ATENCIÓN: Si habla español, tiene a coleman disposición servicios gratuitos de asistencia lingüística. Llame al [phone].    We comply with applicable federal civil rights laws and [place] laws. We do not discriminate on the basis of race, color, national origin, age, disability, sex, sexual orientation, or gender identity.            Thank you!     Thank you for choosing [facility]  for your care. Our goal is always to provide you with excellent care. Hearing back from our patients is one way we can continue to improve our services. Please take a few minutes to complete the written survey that you may receive in the mail after your visit with us. Thank you!             Your Updated Medication List - Protect others around you: Learn how to safely use, store and throw away your medicines at www.disposemymeds.org.          This list is accurate as of 4/18/18 10:55 AM.  Always use your most recent med list.                   Brand Name Dispense Instructions for use Diagnosis    albuterol 108 (90 Base) MCG/ACT Inhaler     PROAIR HFA/PROVENTIL HFA/VENTOLIN HFA    1 Inhaler    Inhale 2 puffs into the lungs every 4 hours as needed for shortness of breath / dyspnea or wheezing Use with spacer    Acute bronchitis, unspecified organism       LOTEMAX 0.5 % ophthalmic susp   Generic drug:  loteprednol      SHAKE LQ AND INT 1 GTT IN OD QID        prednisoLONE acetate 1 % ophthalmic susp    PRED FORTE     USE ONE DROP INTO THE RIGHT EYE Q 2 H UTD. SHAKE VIGEROUSLY BEFORE USING

## 2018-04-18 NOTE — PROGRESS NOTES
Patient c/o amenorrhea. LMP: 3/10/18, X 2 + at home tests, unplanned      HISTORY    Period is late. LMP 3-9. Usually period q 3-4 weeks.     with one spontaneous AB      She obtained 2 home pregnancy tests which were positive.  She is here to confirm.  She does report a little breast tenderness and nausea.    She is not using a birth control method presently.  She states that she dislikes having any medication in her body.    Patient Active Problem List   Diagnosis     Menometrorrhagia     Adjustment disorder with anxious mood     Insomnia, unspecified insomnia     KEISHA (generalized anxiety disorder)     Current Outpatient Prescriptions   Medication Sig Dispense Refill     albuterol (PROAIR HFA, PROVENTIL HFA, VENTOLIN HFA) 108 (90 BASE) MCG/ACT inhaler Inhale 2 puffs into the lungs every 4 hours as needed for shortness of breath / dyspnea or wheezing Use with spacer 1 Inhaler 0     LOTEMAX 0.5 % ophthalmic susp SHAKE LQ AND INT 1 GTT IN OD QID  0     prednisoLONE acetate (PRED FORTE) 1 % ophthalmic susp USE ONE DROP INTO THE RIGHT EYE Q 2 H UTD. SHAKE VIGEROUSLY BEFORE USING  0       REVIEW OF SYSTEMS    Unremarkable other than above.    Past Medical History:   Diagnosis Date     NO ACTIVE PROBLEMS        EXAM  /75  Pulse 113  Temp 98.1  F (36.7  C) (Oral)  Resp 16  Wt 142 lb (64.4 kg)  LMP 03/10/2018  SpO2 98%  BMI 24.96 kg/m2      Results for orders placed or performed in visit on 18   Beta HCG Qual, Urine - [facility] ([id number])   Result Value Ref Range    Beta HCG Qual IFA Urine Negative NEG^Negative          (N91.2) Amenorrhea  (primary encounter diagnosis)  Comment:     Result of our test was discussed.    I did offer a serum hCG just to confirm this result and she will be leaving a blood sample and we will get back to her.    I did briefly discuss a more substantial birth control method and she will be thinking about this.    Plan: Beta HCG Qual, Urine - [facility]          ([id number]), HCG qualitative            I called Neg result to patient. OK to observe. Re check if no period in 3-4 weeks. gm

## 2018-04-26 ENCOUNTER — OFFICE VISIT (OUTPATIENT)
Dept: FAMILY MEDICINE | Facility: CLINIC | Age: 23
End: 2018-04-26
Payer: COMMERCIAL

## 2018-04-26 VITALS
RESPIRATION RATE: 16 BRPM | DIASTOLIC BLOOD PRESSURE: 72 MMHG | WEIGHT: 145 LBS | OXYGEN SATURATION: 100 % | SYSTOLIC BLOOD PRESSURE: 118 MMHG | BODY MASS INDEX: 25.48 KG/M2 | TEMPERATURE: 97.5 F | HEART RATE: 64 BPM

## 2018-04-26 DIAGNOSIS — M79.605 BILATERAL LEG PAIN: ICD-10-CM

## 2018-04-26 DIAGNOSIS — Z83.2 FAMILY HISTORY OF FACTOR V DEFICIENCY: ICD-10-CM

## 2018-04-26 DIAGNOSIS — R53.83 FATIGUE, UNSPECIFIED TYPE: Primary | ICD-10-CM

## 2018-04-26 DIAGNOSIS — M79.604 BILATERAL LEG PAIN: ICD-10-CM

## 2018-04-26 DIAGNOSIS — R79.89 LOW VITAMIN D LEVEL: ICD-10-CM

## 2018-04-26 DIAGNOSIS — Z83.2 FAMILY HISTORY OF VON WILLEBRAND DISEASE: ICD-10-CM

## 2018-04-26 LAB
ALBUMIN SERPL-MCNC: 4.4 G/DL (ref 3.4–5)
ALP SERPL-CCNC: 57 U/L (ref 40–150)
ALT SERPL W P-5'-P-CCNC: 23 U/L (ref 0–50)
ANION GAP SERPL CALCULATED.3IONS-SCNC: 7 MMOL/L (ref 3–14)
AST SERPL W P-5'-P-CCNC: 14 U/L (ref 0–45)
BASOPHILS # BLD AUTO: 0 10E9/L (ref 0–0.2)
BASOPHILS NFR BLD AUTO: 0.5 %
BILIRUB SERPL-MCNC: 0.3 MG/DL (ref 0.2–1.3)
BUN SERPL-MCNC: 13 MG/DL (ref 7–30)
CALCIUM SERPL-MCNC: 9.5 MG/DL (ref 8.5–10.1)
CHLORIDE SERPL-SCNC: 104 MMOL/L (ref 94–109)
CO2 SERPL-SCNC: 26 MMOL/L (ref 20–32)
CREAT SERPL-MCNC: 0.73 MG/DL (ref 0.52–1.04)
CRP SERPL-MCNC: 3.2 MG/L (ref 0–8)
DIFFERENTIAL METHOD BLD: NORMAL
EOSINOPHIL # BLD AUTO: 0.1 10E9/L (ref 0–0.7)
EOSINOPHIL NFR BLD AUTO: 1.1 %
ERYTHROCYTE [DISTWIDTH] IN BLOOD BY AUTOMATED COUNT: 12.2 % (ref 10–15)
ERYTHROCYTE [SEDIMENTATION RATE] IN BLOOD BY WESTERGREN METHOD: 5 MM/H (ref 0–20)
GFR SERPL CREATININE-BSD FRML MDRD: >90 ML/MIN/1.7M2
GLUCOSE SERPL-MCNC: 89 MG/DL (ref 70–99)
HCT VFR BLD AUTO: 41.4 % (ref 35–47)
HGB BLD-MCNC: 14.3 G/DL (ref 11.7–15.7)
LYMPHOCYTES # BLD AUTO: 1.6 10E9/L (ref 0.8–5.3)
LYMPHOCYTES NFR BLD AUTO: 20.4 %
MCH RBC QN AUTO: 29.9 PG (ref 26.5–33)
MCHC RBC AUTO-ENTMCNC: 34.5 G/DL (ref 31.5–36.5)
MCV RBC AUTO: 87 FL (ref 78–100)
MONOCYTES # BLD AUTO: 0.5 10E9/L (ref 0–1.3)
MONOCYTES NFR BLD AUTO: 5.7 %
NEUTROPHILS # BLD AUTO: 5.7 10E9/L (ref 1.6–8.3)
NEUTROPHILS NFR BLD AUTO: 72.3 %
PLATELET # BLD AUTO: 260 10E9/L (ref 150–450)
POTASSIUM SERPL-SCNC: 4.1 MMOL/L (ref 3.4–5.3)
PROT SERPL-MCNC: 8.3 G/DL (ref 6.8–8.8)
RBC # BLD AUTO: 4.78 10E12/L (ref 3.8–5.2)
SODIUM SERPL-SCNC: 137 MMOL/L (ref 133–144)
TSH SERPL DL<=0.005 MIU/L-ACNC: 1.33 MU/L (ref 0.4–4)
WBC # BLD AUTO: 7.9 10E9/L (ref 4–11)

## 2018-04-26 PROCEDURE — 80053 COMPREHEN METABOLIC PANEL: CPT | Performed by: PHYSICIAN ASSISTANT

## 2018-04-26 PROCEDURE — 84443 ASSAY THYROID STIM HORMONE: CPT | Performed by: PHYSICIAN ASSISTANT

## 2018-04-26 PROCEDURE — 85652 RBC SED RATE AUTOMATED: CPT | Performed by: PHYSICIAN ASSISTANT

## 2018-04-26 PROCEDURE — 82306 VITAMIN D 25 HYDROXY: CPT | Performed by: PHYSICIAN ASSISTANT

## 2018-04-26 PROCEDURE — 85025 COMPLETE CBC W/AUTO DIFF WBC: CPT | Performed by: PHYSICIAN ASSISTANT

## 2018-04-26 PROCEDURE — 86140 C-REACTIVE PROTEIN: CPT | Performed by: PHYSICIAN ASSISTANT

## 2018-04-26 PROCEDURE — 36415 COLL VENOUS BLD VENIPUNCTURE: CPT | Performed by: PHYSICIAN ASSISTANT

## 2018-04-26 PROCEDURE — 99214 OFFICE O/P EST MOD 30 MIN: CPT | Performed by: PHYSICIAN ASSISTANT

## 2018-04-26 ASSESSMENT — PAIN SCALES - GENERAL: PAINLEVEL: NO PAIN (0)

## 2018-04-26 NOTE — NURSING NOTE
"Chief Complaint   Patient presents with     Circulation Problems     Health Maintenance     Immunizations       Initial /72  Pulse 64  Temp 97.5  F (36.4  C) (Oral)  Resp 16  Wt 145 lb (65.8 kg)  SpO2 100%  BMI 25.48 kg/m2 Estimated body mass index is 25.48 kg/(m^2) as calculated from the following:    Height as of 8/29/17: 5' 3.25\" (1.607 m).    Weight as of this encounter: 145 lb (65.8 kg).  Medication Reconciliation: complete  Michelle Castrejon CMA    "

## 2018-04-26 NOTE — MR AVS SNAPSHOT
After Visit Summary   4/26/2018    Maria Del Rosario Barger    MRN: [id number]           Patient Information     Date Of Birth          1995        Visit Information        Provider Department      4/26/2018 9:40 AM Pilo Lau PA-C [facility]        Today's Diagnoses     Fatigue, unspecified type    -  1    Family history of von Willebrand disease        Family history of factor V deficiency        Bilateral leg pain           Follow-ups after your visit        Additional Services     ONC/HEME ADULT REFERRAL       Your provider has referred you to: [facility]: [place] for Bleeding and Clotting Disorders [facility] [phone]  [email or web address]    Please be aware that coverage of these services is subject to the terms and limitations of your health insurance plan.  Call member services at your health plan with any benefit or coverage questions.      Please bring the following with you to your appointment:    (1) Any X-Rays, CTs or MRIs which have been performed.  Contact the facility where they were done to arrange for  prior to your scheduled appointment.   (2) List of current medications  (3) This referral request   (4) Any documents/labs given to you for this referral                  Who to contact     If you have questions or need follow up information about today's clinic visit or your schedule please contact [facility] directly at [phone].  Normal or non-critical lab and imaging results will be communicated to you by MyChart, letter or phone within 4 business days after the clinic has received the results. If you do not hear from us within 7 days, please contact the clinic through MyChart or phone. If you have a critical or abnormal lab result, we will notify you by phone as soon as possible.  Submit refill requests through [employer] or call your pharmacy and they will forward the refill  "request to us. Please allow 3 business days for your refill to be completed.          Additional Information About Your Visit        MyChart Information     tic lets you send messages to your doctor, view your test results, renew your prescriptions, schedule appointments and more. To sign up, go to [email or web address] . Click on \"Log in\" on the left side of the screen, which will take you to the Welcome page. Then click on \"Sign up Now\" on the right side of the page.     You will be asked to enter the access code listed below, as well as some personal information. Please follow the directions to create your username and password.     Your access code is: [email or web address]  Expires: 2018 10:55 AM     Your access code will  in 90 days. If you need help or a new code, please call your [place] clinic or [phone].        Care EveryWhere ID     This is your [facility] EveryWhere ID. This could be used by other organizations to access your [place] medical records  [id number]        Your Vitals Were     Pulse Temperature Respirations Pulse Oximetry BMI (Body Mass Index)       64 97.5  F (36.4  C) (Oral) 16 100% 25.48 kg/m2        Blood Pressure from Last 3 Encounters:   18 118/72   18 128/75   10/13/17 122/78    Weight from Last 3 Encounters:   18 145 lb (65.8 kg)   18 142 lb (64.4 kg)   10/13/17 145 lb (65.8 kg)              We Performed the Following     CBC with platelets differential     Comprehensive metabolic panel     CRP, inflammation     ESR: Erythrocyte sedimentation rate     ONC/HEME ADULT REFERRAL     TSH with free T4 reflex     Vitamin D Deficiency        Primary Care Provider Office Phone # Fax #    [facility] [phone] [address] [address] [facility] [address]        Equal Access to Services     RM ALFORD AH: Yuval jaino Soroz, waaxda luqadaha, qaybta kaalmada adeyg, faith hamm. So [facility] " [phone].    ATENCIÓN: Si silvana sandhu, tiene a coleman disposición servicios gratuitos de asistencia lingüística. Jannet douglas [phone].    We comply with applicable federal civil rights laws and [place] laws. We do not discriminate on the basis of race, color, national origin, age, disability, sex, sexual orientation, or gender identity.            Thank you!     [facility] you [facility]  [facility]. Our goal is always to provide you with excellent care. Hearing back from our patients is one way we can continue to improve our services. Please take a few minutes to complete the written survey that you may receive in the mail after your visit with us. Thank you!             Your Updated Medication List - Protect others around you: Learn how to safely use, store and throw away your medicines at www.disposemymeds.org.          This list is accurate as of 4/26/18 10:54 AM.  Always use your most recent med list.                   Brand Name Dispense Instructions for use Diagnosis    albuterol 108 (90 Base) MCG/ACT Inhaler    PROAIR HFA/PROVENTIL HFA/VENTOLIN HFA    1 Inhaler    Inhale 2 puffs into the lungs every 4 hours as needed for shortness of breath / dyspnea or wheezing Use with spacer    Acute bronchitis, unspecified organism

## 2018-04-26 NOTE — PROGRESS NOTES
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 She noticed the discoloration about 2 weeks ago. She has experienced aching in her calves and numbness and tingling in her feet x 1 week. The discoloration disappears with elevation. The aching, tingling, and numbness occurs with sitting and standing. She does not notice it while walking. The patient works at [employer] during the day and is a  in the evenings. She is sitting for the majority of her day job.Of note, the patient has a sciatica in her right leg since having her son in 2015. She has seen a back specialist in the past and completed physical therapy. The next step recommended to her was surgery, but she is not interested in this.     The patient reports a family history of Von Willebrand's disease (brother) and Factor V Leiden (paternal grandmother). She has not been tested for either of these disorders.     The patient also reports chronic fatigue. She states that she feels tired the entire day. She has experienced this since she was in high school. She denies hx of anemia. Her menstrual cycles are irregular and sometimes heavy. She denies lightheadedness, heart racing, or palpitations. Just started her period.    She does not eat three meals per day,  usually only one meal. She has an energy drink consistently every day and often coffee or Gatorade too. She reports 6-7 hours of sleep per night. She denies a regular exercise routine.     Problem list and histories reviewed & adjusted, as indicated.  Additional history: as documented        Patient Active Problem List   Diagnosis     Menometrorrhagia     Adjustment disorder with anxious mood     Insomnia, unspecified insomnia     KEISHA (generalized anxiety disorder)     Family history of von Willebrand disease     Family history of factor V deficiency     Past Surgical History:   Procedure Laterality Date     CHOLECYSTOSTOMY  07/2016     COMBINED ESOPHAGOSCOPY, GASTROSCOPY, DUODENOSCOPY (EGD) WITH CO2 INSUFFLATION Bilateral 7/25/2016    Procedure: COMBINED ESOPHAGOSCOPY, GASTROSCOPY, DUODENOSCOPY (EGD) WITH CO2 INSUFFLATION;  Surgeon: Warren Fernandez MD;  Location: [facility]     ESOPHAGOSCOPY, GASTROSCOPY, DUODENOSCOPY (EGD), COMBINED N/A 7/25/2016    Procedure: COMBINED ESOPHAGOSCOPY, GASTROSCOPY, DUODENOSCOPY (EGD), BIOPSY SINGLE OR MULTIPLE;  Surgeon: Warren Fernandez MD;  Location: [facility] OR     TONSILLECTOMY       trigger thumb         Social History   Substance Use Topics     Smoking status: Current Every Day Smoker     Packs/day: 1.00     Types: Cigarettes     Smokeless tobacco: Never Used     Alcohol use Yes      Comment: occasional      History reviewed. No pertinent family history.      Current Outpatient Prescriptions   Medication Sig Dispense Refill     albuterol (PROAIR HFA, PROVENTIL HFA, VENTOLIN HFA) 108 (90 BASE) MCG/ACT inhaler Inhale 2 puffs into the lungs every 4 hours as needed for shortness of breath / dyspnea or wheezing Use with spacer (Patient not taking: Reported on 4/26/2018) 1 Inhaler 0     BP Readings from Last 3 Encounters:   04/26/18 118/72   04/18/18 128/75   10/13/17 122/78    Wt Readings from Last 3 Encounters:   04/26/18 145 lb (65.8 kg)   04/18/18 142 lb (64.4 kg)   10/13/17  145 lb (65.8 kg)         ROS:  Constitutional, HEENT, cardiovascular, pulmonary, gi and gu systems are negative, except as otherwise noted.    OBJECTIVE:     /72  Pulse 64  Temp 97.5  F (36.4  C) (Oral)  Resp 16  Wt 145 lb (65.8 kg)  SpO2 100%  BMI 25.48 kg/m2  Body mass index is 25.48 kg/(m^2).  GENERAL: healthy, alert and no distress  EYES: Eyes grossly normal to inspection, PERRL and conjunctivae and sclerae normal  NECK: no adenopathy, no asymmetry, masses, or scars and thyroid normal to palpation  RESP: lungs clear to auscultation - no rales, rhonchi or wheezes  CV: regular rate and rhythm, normal S1 S2, no S3 or S4, no murmur, click or rub, no peripheral edema and peripheral pulses strong  ABDOMEN: soft, mild tenderness in LLQ on palpation, no hepatosplenomegaly, no masses and bowel sounds normal  MS: no gross musculoskeletal defects noted, no edema.  No atrophy. Lower legs are non-tender on palpation. Full ROM of lower extremities. Strength is 5/5 of lower extremities.   Circulatory: Radial and dorsalis pedis pulses are 2+ bilaterally.  SKIN: no suspicious lesions or rashes. Legs are flesh-colored on exam with some diffuse mottling/ blanchable erythema. Calves soft non-tender Neurovascularly Intact Distally.   NEURO: Normal strength and tone, mentation intact and speech normal  PSYCH: mentation appears normal, affect normal/bright    Diagnostic Test Results:  Results for orders placed or performed in visit on 04/26/18 (from the past 24 hour(s))   CBC with platelets differential   Result Value Ref Range    WBC 7.9 4.0 - 11.0 10e9/L    RBC Count 4.78 3.8 - 5.2 10e12/L    Hemoglobin 14.3 11.7 - 15.7 g/dL    Hematocrit 41.4 35.0 - 47.0 %    MCV 87 78 - 100 fl    MCH 29.9 26.5 - 33.0 pg    MCHC 34.5 31.5 - 36.5 g/dL    RDW 12.2 10.0 - 15.0 %    Platelet Count 260 150 - 450 10e9/L    Diff Method Automated Method     % Neutrophils 72.3 %    % Lymphocytes 20.4 %    % Monocytes 5.7 %    % Eosinophils 1.1 %     % Basophils 0.5 %    Absolute Neutrophil 5.7 1.6 - 8.3 10e9/L    Absolute Lymphocytes 1.6 0.8 - 5.3 10e9/L    Absolute Monocytes 0.5 0.0 - 1.3 10e9/L    Absolute Eosinophils 0.1 0.0 - 0.7 10e9/L    Absolute Basophils 0.0 0.0 - 0.2 10e9/L   ESR: Erythrocyte sedimentation rate   Result Value Ref Range    Sed Rate 5 0 - 20 mm/h       ASSESSMENT/PLAN:       ICD-10-CM    1. Fatigue, unspecified type R53.83 CBC with platelets differential     Comprehensive metabolic panel     TSH with free T4 reflex     Vitamin D Deficiency   2. Family history of von Willebrand disease Z83.2 ONC/HEME ADULT REFERRAL   3. Family history of factor V deficiency Z83.2 ONC/HEME ADULT REFERRAL   4. Bilateral leg pain M79.604 ESR: Erythrocyte sedimentation rate    M79.605 CRP, inflammation     CANCELED: US Lower Extremity Venous Duplex Bilateral     1. CBC, CMP, TSH, and Vitamin D today. I encouraged her to eat three well-balanced meals per day, as poor nutrition may be contributing to her fatigue. I encouraged regular exercise and sleep hygiene.   2-3. Based on her family history of clotting disorders, I recommend she follow-up with a hematologist. However, I do not suspect her current symptoms are related. I provided a referral. The patient will look into this.   4. ESR and CRP today to rule out vasculitis. I suspect her leg pain is related to her spine pathology. I recommend she follow-up with sports medicine and possibly another trial of physical therapy. The patient will think about this.     The patient's questions were answered. She understood and agreed to this plan.     I, Nicolette Caldera, PA student from [employer], acted as a scribe.       The student acted as a scribe and the encounter documented above was completely performed by myself and the documentation reflects the work I have performed today.   Discussed with Dr. Heath.   Pilo Lau PA-C  [facility]

## 2018-04-27 PROBLEM — R79.89 LOW VITAMIN D LEVEL: Status: ACTIVE | Noted: 2018-04-27

## 2018-04-27 LAB — DEPRECATED CALCIDIOL+CALCIFEROL SERPL-MC: 17 UG/L (ref 20–75)

## 2018-04-30 ENCOUNTER — TELEPHONE (OUTPATIENT)
Dept: FAMILY MEDICINE | Facility: CLINIC | Age: 23
End: 2018-04-30

## 2018-04-30 DIAGNOSIS — M79.606 PAIN OF LOWER EXTREMITY, UNSPECIFIED LATERALITY: Primary | ICD-10-CM

## 2018-04-30 NOTE — TELEPHONE ENCOUNTER
"Per 4/26/18 OV Pilo Lau PA-C: \"I suspect her leg pain is related to her spine pathology. I recommend she follow-up with sports medicine and possibly another trial of physical therapy. The patient will think about this.\"    Have pended sports medicine referral for Pilo Lau PA-C to sign and send back to cardinal team for : to notify and give scheduling number.     "

## 2018-04-30 NOTE — TELEPHONE ENCOUNTER
Patient is calling to request referral for sport medicine clinic for back. Please call to advise. Thank you.

## 2018-05-01 NOTE — TELEPHONE ENCOUNTER
LM for patient with Sports med scheduling number [phone]. Left direct line for further questions [phone].    Kortney Devine,

## 2018-05-05 ENCOUNTER — OFFICE VISIT (OUTPATIENT)
Dept: ORTHOPEDICS | Facility: CLINIC | Age: 23
End: 2018-05-05
Payer: COMMERCIAL

## 2018-05-05 VITALS
DIASTOLIC BLOOD PRESSURE: 74 MMHG | BODY MASS INDEX: 24.75 KG/M2 | WEIGHT: 145 LBS | HEIGHT: 64 IN | SYSTOLIC BLOOD PRESSURE: 122 MMHG

## 2018-05-05 DIAGNOSIS — G89.29 CHRONIC BILATERAL LOW BACK PAIN WITH RIGHT-SIDED SCIATICA: ICD-10-CM

## 2018-05-05 DIAGNOSIS — M54.6 BILATERAL THORACIC BACK PAIN, UNSPECIFIED CHRONICITY: ICD-10-CM

## 2018-05-05 DIAGNOSIS — M54.16 LUMBAR RADICULOPATHY: Primary | ICD-10-CM

## 2018-05-05 DIAGNOSIS — M54.41 CHRONIC BILATERAL LOW BACK PAIN WITH RIGHT-SIDED SCIATICA: ICD-10-CM

## 2018-05-05 PROCEDURE — 99244 OFF/OP CNSLTJ NEW/EST MOD 40: CPT | Performed by: PEDIATRICS

## 2018-05-05 NOTE — PROGRESS NOTES
Sports Medicine Clinic Visit    PCP: Adam, [facility]    Maria Del Rosario Barger is a 22 year old female who is seen  in consultation at the request of  Pilo Lau PA-C presenting with low back pain.  Patient has noticed that she has color change in her legs, states he legs turn red and purplish, from mid thigh to just above her ankles.  Was seen 2 years ago for low back pain.  Did have an MRI.  States she has just learned to live with the pain down her legs, right worse than left .  She was referred by Dr. Weems for an ISAAC, and she states she was unable to make that appointment and never rescheduled.    Feels that her right foot shakes when she is driving.    Denies any bowl or bladder control issues   Difficulty with heel walking.      **  Numbness and tingling, with tightening, in the middle of the low back at rest.  Pain goes down the right lower extremity. Pain worse after and with activities. Pain is worse and has change from her last visit with Dr. Weems. She has noted weakness and tremors in the right leg.     Highest point of numbness and tingling in the lower thoracic area, staying in the middle of the back, and not wrapping laterally.     She has tried chiropractic care, physical therapy.  It has been about 1 years since PT.     Injury: giving birth     Location of Pain: bilateral low back and legs   Duration of Pain: 2+ year(s)  Rating of Pain at worst: 9/10  Rating of Pain Currently: 5/10  Symptoms are better with: Nothing  Symptoms are worse with: everything   Additional Features:   Positive: paresthesias and weakness   Negative: swelling, bruising, popping, grinding, catching, locking, instability, pain in other joints and systemic symptoms  Other evaluation and/or treatments so far consists of: MRI  Prior History of related problems: denies prior to giving birth     Social History: desk job and  on weekends     Maria Del Rosario was asked to complete the Oswestry Low Back Disability Index   today  "in the office.  Disability score: 48%.     Review of Systems  Musculoskeletal: as above  Remainder of review of systems is negative including constitutional, CV, pulmonary, GI, Skin and Neurologic except as noted in HPI or medical history.    This document serves as a record of the services and decisions personally performed and made by German Carr DO, CAQ. It was created on his behalf by Jamey Louie, a trained medical scribe. The creation of this document is based the provider's statements to the medical scribe.  Jamey Louie May 5, 2018, 8:58 AM      Past Medical History:   Diagnosis Date     NO ACTIVE PROBLEMS      Past Surgical History:   Procedure Laterality Date     CHOLECYSTOSTOMY  07/2016     COMBINED ESOPHAGOSCOPY, GASTROSCOPY, DUODENOSCOPY (EGD) WITH CO2 INSUFFLATION Bilateral 7/25/2016    Procedure: COMBINED ESOPHAGOSCOPY, GASTROSCOPY, DUODENOSCOPY (EGD) WITH CO2 INSUFFLATION;  Surgeon: Warren Fernandez MD;  Location: [facility]     ESOPHAGOSCOPY, GASTROSCOPY, DUODENOSCOPY (EGD), COMBINED N/A 7/25/2016    Procedure: COMBINED ESOPHAGOSCOPY, GASTROSCOPY, DUODENOSCOPY (EGD), BIOPSY SINGLE OR MULTIPLE;  Surgeon: Warren Fernandez MD;  Location: [facility] OR     TONSILLECTOMY       trigger thumb       Fam hx: noncontributory    Social History     Social History     Marital status: Single     Spouse name: N/A     Number of children: N/A     Years of education: N/A     Occupational History     Not on file.     Social History Main Topics     Smoking status: Current Every Day Smoker     Packs/day: 1.00     Types: Cigarettes     Smokeless tobacco: Never Used     Alcohol use Yes      Comment: occasional      Drug use: No     Sexual activity: Yes     Partners: Male     Other Topics Concern     Not on file     Social History Narrative       Objective  /74  Ht 5' 4\" (1.626 m)  Wt 145 lb (65.8 kg)  BMI 24.89 kg/m2    GENERAL APPEARANCE: healthy, alert and no distress   GAIT: NORMAL  SKIN: no " suspicious lesions or rashes  NEURO: Normal strength and tone, mentation intact and speech normal  PSYCH:  mentation appears normal and affect normal/bright  HEENT: no scleral icterus  CV: no extremity edema   RESP: nonlabored breathing     Low back exam:    Inspection:       no visible deformity in the low back       normal skin       normal vascular       normal lymphatic    ROM:       Limited flexion hands to knees        Limited extension with pain in the low back       Tender:       midline       paraspinal muscles, right     Non Tender:       remainder of lumbar spine    Strength:       hip flexion 5/5 symmetric, no pain        knee extension 5/5 symmetric, no pain        ankle dorsiflexion 5/5 on the left, giving way on the right        ankle plantarflexion 5/5 on the left, giving way on the right        dorsiflexion of the great toe able to resist on right,  5/5 on the left        Knee flexion 5/5 symmetric, no pain     Reflexes:       patellar (L3, L4) symmetric normal       achilles tendons (S1) symmetric normal    Sensation:      grossly intact throughout lower extremities    Skin:       well perfused       capillary refill brisk    Special tests:       straight leg raise left neg (-)         straight leg raise right positive (+)          Radiology:  Visualized MRI of the lumbar spine from 11/19/2016, and reviewed images and report with patient.  MRI demonstrates mild L5-S1 disc bulge.    MRI LUMBAR SPINE WITHOUT CONTRAST November 19, 2016 10:55 AM      HISTORY: Lumbago, pain into right leg. Lumbago with sciatica, right  side.     TECHNIQUE: Multiplanar multisequence MRI of the lumbar spine without  contrast.     COMPARISON: None.     FINDINGS: The report is dictated assuming five lumbar-type vertebral  bodies.  Sagittal images demonstrate normal vertebral body height.  Bone marrow signal is unremarkable. Tip of the conus medullaris and  cauda equina are unremarkable.      T12-L1: No disc herniation or  stenosis. Facet joints are unremarkable.     L1-L2: No disc herniation or stenosis. Facet joints are unremarkable.         L2-L3: No disc herniation or stenosis. Facet joints are unremarkable.     L3-L4: Mild disc bulge and facet hypertrophy. No stenosis.     L4-L5: Mild disc dehydration and disc bulge. Mild facet hypertrophy.  No stenosis.     L5-S1: Small right central broad-based disc bulge or protrusion  posteriorly displaces but does not appear to compress the descending  right S1 nerve root. No central or foraminal stenosis.     Paraspinous soft tissues: Unremarkable.         IMPRESSION:    1. At L5-S1 there is a small right central broad-based disc bulge or  protrusion that posteriorly displaces but does not appear to compress  the descending right S1 nerve root. No central or foraminal stenosis.  2. Mild disc bulge at L3-L4, and L4-L5 with facet hypertrophy but no  stenosis.     AVTAR IVAN MD    Assessment:  1. Lumbar radiculopathy    2. Chronic bilateral low back pain with right-sided sciatica    3. Bilateral thoracic back pain, unspecified chronicity        Plan:  Discussed the assessment with the patient.    Pertinent imaging of the area reviewed with the patient.    Discussed:  *Symptom Treatment  *Activity Modification   *Imaging - update lumbar MRI, thoracic spine   *Acupuncture, Massage Therapy, or Chiropractic care   *Rehab   *Medication   *Injection - ISAAC or facet   *Electrodiagnostic testing - EMG     Topical Treatments: Ice prn   Over the counter medication: Patient's preferred OTC medication as directed on packaging.  MRI of the lumbar spine   Activity Modification: as discussed   Rehab: continue comfortable Home Exercise Program   Discussed potential refresher visit to physical therapy; hold for now pending MRI   Discussed injection, ISAAC or facet, pending results of MRI . She is potentially interested in considering this, and with progressive symptoms, will update MRI first. She had discussed  potential for injection in past with Dr Weems.  Follow up: call with results of MRI; patient may return pending on results   Questions answered. The patient indicates understanding of these issues and agrees with the plan.     German Carr DO, CAQ    CC: Pilo Lau      Disclaimer: This note consists of symbols derived from keyboarding, dictation and/or voice recognition software. As a result, there may be errors in the script that have gone undetected. Please consider this when interpreting information found in this chart.    The information in this document, created by the medical scribe for me, accurately reflects the services I personally performed and the decisions made by me. I have reviewed and approved this document for accuracy.   German Carr DO, CAQ

## 2018-05-05 NOTE — MR AVS SNAPSHOT
After Visit Summary   5/5/2018    Maria Del Rosario Barger    MRN: [id number]           Patient Information     Date Of Birth          1995        Visit Information        Provider Department      5/5/2018 8:20 AM German Carr,  [facility]        Today's Diagnoses     Lumbar radiculopathy    -  1    Chronic bilateral low back pain with right-sided sciatica        Bilateral thoracic back pain, unspecified chronicity          Care Instructions    Schedule MRI        Advanced imaging is done by appointment. Some insurance require a prior authorization to be completed which may delay the time until you are able to schedule your appointment.  Please call [facility] and [facility]: [phone] to schedule your MRI.  Depending on your availability you can usually schedule within the next 1-2 days.    The clinic will call you with results, if you have not heard from the clinic within 3-4 days following your MRI please contact us at the number listed below.       If you have any further questions for your physician or physician s care team you can call [phone] and use option 3 to leave a voice message. Calls received during business hours will be returned same day.                      Follow-ups after your visit        Future tests that were ordered for you today     Open Future Orders        Priority Expected Expires Ordered    MR Lumbar Spine w/o Contrast Routine  5/5/2019 5/5/2018            Who to contact     If you have questions or need follow up information about today's clinic visit or your schedule please contact [facility] directly at [phone].  Normal or non-critical lab and imaging results will be communicated to you by MyChart, letter or phone within 4 business days after the clinic has received the results. If you do not hear from us within 7 days, please contact the clinic through MyChart or phone. If you  "have a critical or abnormal lab result, we will notify you by phone as soon as possible.  Submit refill requests through [employer] or call your pharmacy and they will forward the refill request to us. Please allow 3 business days for your refill to be completed.          Additional Information About Your Visit        [employer] Information     [employer] lets you send messages to your doctor, view your test results, renew your prescriptions, schedule appointments and more. To sign up, go to [email or web address] . Click on \"Log in\" on the left side of the screen, which will take you to the Welcome page. Then click on \"Sign up Now\" on the right side of the page.     You will be asked to enter the access code listed below, as well as some personal information. Please follow the directions to create your username and password.     Your access code is: [email or web address]  Expires: 2018 10:55 AM     Your access code will  in 90 days. If you need help or a new code, please call your [facility] clinic or [phone].        Care EveryWhere ID     This is your Care EveryWhere ID. This could be used by other organizations to access your [facility] medical records  [id number]        Your Vitals Were     Height BMI (Body Mass Index)                5' 4\" (1.626 m) 24.89 kg/m2           Blood Pressure from Last 3 Encounters:   18 122/74   18 118/72   18 128/75    Weight from Last 3 Encounters:   18 145 lb (65.8 kg)   18 145 lb (65.8 kg)   18 142 lb (64.4 kg)               Primary Care Provider Office Phone # Fax #    [facility] [phone] [address] [facility] [address]        Equal Access to Services     [facility] ROCÍO : Yuval Thapa, waglenda juares, qaybta kaalmamat fernandes, faith hamm. [facility] [phone].    ATENCIÓN: Si habla español, tiene a coleman disposición servicios gratuitos de asistencia " lingüística. Jannet al [phone].    We comply with applicable federal civil rights laws and [place] laws. We do not discriminate on the basis of race, color, national origin, age, disability, sex, sexual orientation, or gender identity.            Thank you!     Thank you for choosing [facility]  for your care. Our goal is always to provide you with excellent care. Hearing back from our patients is one way we can continue to improve our services. Please take a few minutes to complete the written survey that you may receive in the mail after your visit with us. Thank you!             Your Updated Medication List - Protect others around you: Learn how to safely use, store and throw away your medicines at www.disposemymeds.org.          This list is accurate as of 5/5/18  9:17 AM.  Always use your most recent med list.                   Brand Name Dispense Instructions for use Diagnosis    albuterol 108 (90 Base) MCG/ACT Inhaler    PROAIR HFA/PROVENTIL HFA/VENTOLIN HFA    1 Inhaler    Inhale 2 puffs into the lungs every 4 hours as needed for shortness of breath / dyspnea or wheezing Use with spacer    Acute bronchitis, unspecified organism       cholecalciferol 96681 units capsule    VITAMIN D3    8 capsule    Take 1 capsule (50,000 Units) by mouth once a week    Low vitamin D level

## 2018-05-05 NOTE — PATIENT INSTRUCTIONS
Schedule MRI        Advanced imaging is done by appointment. Some insurance require a prior authorization to be completed which may delay the time until you are able to schedule your appointment.  Please call [place] [place]: [phone] to schedule your MRI.  Depending on your availability you can usually schedule within the next 1-2 days.    The clinic will call you with results, if you have not heard from the clinic within 3-4 days following your MRI please contact us at the number listed below.       If you have any further questions for your physician or physician s care team you can call [phone] and use option 3 to leave a voice message. Calls received during business hours will be returned same day.

## 2018-05-09 ENCOUNTER — RADIANT APPOINTMENT (OUTPATIENT)
Dept: MRI IMAGING | Facility: CLINIC | Age: 23
End: 2018-05-09
Attending: PEDIATRICS
Payer: COMMERCIAL

## 2018-05-09 DIAGNOSIS — M54.16 LUMBAR RADICULOPATHY: ICD-10-CM

## 2018-05-09 PROCEDURE — 72148 MRI LUMBAR SPINE W/O DYE: CPT | Mod: TC

## 2018-05-11 ENCOUNTER — TELEPHONE (OUTPATIENT)
Dept: ORTHOPEDICS | Facility: CLINIC | Age: 23
End: 2018-05-11

## 2018-05-11 DIAGNOSIS — M54.41 CHRONIC BILATERAL LOW BACK PAIN WITH RIGHT-SIDED SCIATICA: ICD-10-CM

## 2018-05-11 DIAGNOSIS — M54.16 LUMBAR RADICULOPATHY: Primary | ICD-10-CM

## 2018-05-11 DIAGNOSIS — G89.29 CHRONIC BILATERAL LOW BACK PAIN WITH RIGHT-SIDED SCIATICA: ICD-10-CM

## 2018-05-11 NOTE — TELEPHONE ENCOUNTER
Results for orders placed or performed in visit on 05/09/18   MR Lumbar Spine w/o Contrast    Narrative    MRI LUMBAR SPINE WITHOUT CONTRAST   5/9/2018 9:31 AM     HISTORY: Ongoing low back pain with right radiculopathy. Lumbar  radiculopathy.    TECHNIQUE: Multiplanar, multisequence MRI of the lumbar spine without  contrast.    COMPARISON: MRI from 11/19/2016.    FINDINGS: The report is dictated assuming five lumbar-type vertebral  bodies. Sagittal images demonstrate normal vertebral body height. Bone  marrow signal is unremarkable. Tip of the conus medullaris and cauda  equina are unremarkable.    T12-L1: No disc herniation or stenosis. Facet joints are unremarkable.    L1-L2: No disc herniation or stenosis. Facet joints are unremarkable.     L2-L3: No disc herniation or stenosis. Facet joints are unremarkable.    L3-L4: No disc herniation or stenosis. Mild facet degenerative  changes.     L4-L5: Mild facet degenerative changes. Mild disc bulge. No central or  foraminal stenosis.    L5-S1: Small right central broad-based disc bulge or protrusion. No  central stenosis. There is contact but no definite compression of the  descending right S1 nerve root in the subarticular recess. No central  stenosis. Neural foramen are patent.    Paraspinous soft tissues: Unremarkable.      Impression    IMPRESSION:   1. At L5-S1 there is a small right central broad-based disc bulge or  protrusion that posteriorly displaces but does not definitely compress  the descending right S1 nerve root. No central or foraminal stenosis.  2. At L4-L5 there is mild disc bulge and facet hypertrophy but no  stenosis.  3. Mild facet degenerative changes at L3-L4.  4. No significant change.    AVTAR IVAN MD

## 2018-05-15 NOTE — TELEPHONE ENCOUNTER
Findings as noted. Appears to be some progression of L5-S1 disc bulging compared to prior imaging from 2016.  Would offer injection if she is interested in that; that was also discussion with Dr Weems, but she did not have injection that was scheduled in Dec 2016. May order injection, anticipate ISAAC with her radiating symptoms.  Would also suggest return to PT, last was Dec 2016.  F/u 2-3 weeks after injection, sooner prn.  Thanks.  German Carr, , CAQ

## 2018-05-16 ENCOUNTER — TELEPHONE (OUTPATIENT)
Dept: PALLIATIVE MEDICINE | Facility: CLINIC | Age: 23
End: 2018-05-16

## 2018-05-16 NOTE — TELEPHONE ENCOUNTER
Called patient and discussed results along with recommendations.  Patient is agreeable to injection and PT.  Orders placed.  She will follow up 2-3 weeks post injection.  Jazmyn Yi MS ATC

## 2018-05-16 NOTE — TELEPHONE ENCOUNTER
Pre-screening Questions for Radiology Injections:    Injection to be done at which interventional clinic site? [facility] [place]   If [place], instruct patient to arrive 30 minutes before the scheduled appointment time.     [facility]    Procedure ordered? Lumbar Epidural Steroid Injection    What insurance would patient like us to bill for this procedure? BCBS and [place]      Worker's comp or MVA (motor vehicle accident) -Any injection DO NOT SCHEDULE and route to Jeannette Guaman.      [employer] insurance - For SI joint injections, DO NOT SCHEDULE and route Juana Villarreal. [employer] NO PA REQUIRED EFFECTIVE 11/1/2017      HEALTH [employer]- MBB's must be scheduled at LEAST two weeks apart      [employer] - Any injection besides hip/shoulder/knee joint DO NOT SCHEDULE and route to Juana Villarreal. She will obtain PA and call pt back to schedule procedure or notify pt of denial.       HP [employer]-Route to Juana for review    Any chance of pregnancy? NO   If YES, do NOT schedule and route to RN pool    Is an  needed? No     Patient has a drive home? (mandatory) YES:     Is patient taking any blood thinners (plavix, coumadin, jantoven, warfarin, heparin, pradaxa or dabigatran )? No   If hold needed, do NOT schedule, route to RN pool     Is patient taking any aspirin products? No     If more than 325mg/day do NOT schedule; route to RN pool     For CERVICAL procedures, hold all aspirin products for 6 days.      Does the patient have a bleeding or clotting disorder? No     If YES, okay to schedule AND route to RN nurse pool    **For any patients with platelet count <100, must be forwarded to provider**    Is patient diabetic?  No  If YES, have them bring their glucometer.    Does patient have an active infection or treated for one within the past week? No     Is patient currently taking any antibiotics?  No     For patients on chronic, preventative, or  prophylactic antibiotics, procedures may be scheduled.     For patients on antibiotics for active or recent infection:    Millie Womack Burton, [place]-antibiotic course must have been completed for 4 days    Is patient currently taking any steroid medications? (i.e. Prednisone, Medrol)  No     For patients on steroid medications:    Millie Womack Burton, [place]-steroid course must have been completed for 4 days    Reviewed with patient:  If you are started on any steroids or antibiotics between now and your appointment, you must contact us because it may affect our ability to perform your procedure.  Yes    Is patient actively being treated for cancer or immunocompromised? No  If YES, do NOT schedule and route to RN pool     Are you able to get on and off an exam table with minimal or no assistance? Yes  If NO, do NOT schedule and route to RN pool    Are you able to roll over and lay on your stomach with minimal or no assistance? Yes  If NO, do NOT schedule and route to RN pool     Any allergies to contrast dye, iodine, shellfish, or numbing and steroid medications? No  If YES, route to RN pool AND add allergy information to appointment notes    Allergies: Sulfa drugs; Tobramycin; and Topamax [topiramate]      Has the patient had a flu shot or any other vaccinations within 7 days before or after the procedure.  No     Does patient have an MRI/CT?  YES: 5/9/18  (SI joint, hip injections, lumbar sympathetic blocks, and stellate ganglion blocks do not require an MRI)    Was the MRI done w/in the last 3 years?  Yes    Was MRI done at [place]? Yes      If not, where was it done? N/A       If MRI was not done at [facility], [facility] or [facility] do NOT schedule and route to nursing.  If pt has an imaging disc, the injection may be scheduled but pt has to bring disc to appt. If they show up w/out disc the injection cannot be done    Reminders (please tell patient if applicable):       Instructed  pt to arrive 30 minutes early for IV start if this is for a cervical procedure, ALL sympathetic (stellate ganglion, hypogastric, or lumbar sympathetic block) and all sedation procedures (RFA, spinal cord stimulation trials).  Not Applicable   -IVs are not routinely placed for Dr. Hartman cervical cases   -Dr. Parsons: IVs for cervical ESIs and cervical TBDs (not CMBBs/facet inj)      If NPO for sedation, informed patient that it is okay to take medications with sips of water (except if they are to hold blood thinners).  Not Applicable   *DO take blood pressure medication if it is prescribed*      If this is for a cervical ISAAC, informed patient that aspirin needs to be held for 6 days.   Not Applicable      For all patients not having spinal cord stimulator (SCS) trials or radiofrequency ablations (RFAs), informed patient:    IV sedation is not provided for this procedure.  If you feel that an oral anti-anxiety medication is needed, you can discuss this further with your referring provider or primary care provider.  The Pain Clinic provider will discuss specifics of what the procedure includes at your appointment.  Most procedures last 10-20 minutes.  We use numbing medications to help with any discomfort during the procedure.  NO      Do not schedule procedures requiring IV placement in the first appointment of the day or first appointment after lunch. Do NOT schedule at 0745, 0815 or 1245.       For patients 85 or older we recommend having an adult stay w/ them for the remainder of the day.       Does the patient have any questions?  NO  Jeannette Guaman  [facility]

## 2018-07-11 NOTE — PROGRESS NOTES
SUBJECTIVE:   Maria Del Rosario Barger is a 23 year old female who presents to clinic today for the following health issues:    Birth control counseling    Patient in a new relationship and not wanting pregnancy anytime soon. Previously used oral contraceptive pill and likely wants to restart this. LMP was 7/5/18.  Also, patient has been noticing an increase in vaginal odor and discharge for the last 4 days. No vaginal/vulvar itching, denies abnormal urinary symptoms, pelvic pain, fevers or nausea. Amenable to STI screening. No other concerns today.    Problem list and histories reviewed & adjusted, as indicated.  Additional history: as documented    Patient Active Problem List   Diagnosis     Menometrorrhagia     Adjustment disorder with anxious mood     Insomnia, unspecified insomnia     KEISHA (generalized anxiety disorder)     Family history of von Willebrand disease     Family history of factor V deficiency     Low vitamin D level     Past Surgical History:   Procedure Laterality Date     CHOLECYSTOSTOMY  07/2016     COMBINED ESOPHAGOSCOPY, GASTROSCOPY, DUODENOSCOPY (EGD) WITH CO2 INSUFFLATION Bilateral 7/25/2016    Procedure: COMBINED ESOPHAGOSCOPY, GASTROSCOPY, DUODENOSCOPY (EGD) WITH CO2 INSUFFLATION;  Surgeon: Warren Fernandez MD;  Location: [facility] OR     ESOPHAGOSCOPY, GASTROSCOPY, DUODENOSCOPY (EGD), COMBINED N/A 7/25/2016    Procedure: COMBINED ESOPHAGOSCOPY, GASTROSCOPY, DUODENOSCOPY (EGD), BIOPSY SINGLE OR MULTIPLE;  Surgeon: Warren Fernandez MD;  Location: [facility] OR     TONSILLECTOMY       trigger thumb         Social History   Substance Use Topics     Smoking status: Current Every Day Smoker     Packs/day: 1.00     Types: Cigarettes     Smokeless tobacco: Never Used     Alcohol use Yes      Comment: occasional      History reviewed. No pertinent family history.        Reviewed and updated as needed this visit by clinical staff       Reviewed and updated as needed this visit by Provider      "    ROS:  Constitutional, HEENT, cardiovascular, pulmonary, gi and gu systems are negative, except as otherwise noted.    OBJECTIVE:     /75  Pulse 83  Temp 97  F (36.1  C) (Oral)  Ht 5' 4\" (1.626 m)  Wt 138 lb 12.8 oz (63 kg)  LMP 07/05/2018 (Exact Date)  SpO2 98%  BMI 23.82 kg/m2  Body mass index is 23.82 kg/(m^2).  GENERAL: healthy, alert and no distress  RESP: lungs clear to auscultation - no rales, rhonchi or wheezes  CV: regular rate and rhythm, normal S1 S2, no S3 or S4, no murmur, click or rub, no peripheral edema and peripheral pulses strong  ABDOMEN: soft, nontender, no hepatosplenomegaly, no masses and bowel sounds normal   (female): normal female external genitalia, normal urethral meatus, vaginal mucosa, normal cervix/adnexa/uterus without masses or discharge  MS: no gross musculoskeletal defects noted, no edema  SKIN: no suspicious lesions or rashes  PSYCH: mentation appears normal, affect normal/bright    Diagnostic Test Results:  Results for orders placed or performed in visit on 07/12/18 (from the past 24 hour(s))   Wet prep   Result Value Ref Range    Specimen Description Vagina     Wet Prep No Trichomonas seen     Wet Prep Clue cells seen (A)     Wet Prep No yeast seen        ASSESSMENT/PLAN:   1. Encounter for initial prescription of contraceptive pills  Discussed options for contraception with patient including oral contraceptive pills, transdermal patches, vaginal ring, Depo Provera, Nexplanon. At this time she would like to try an oral contraceptive pill. We discussed when to start the pill, taking it at the same time every day, possible side effects she may experience, and use of barrier method to protect against STDs.   - levonorgestrel-ethinyl estradiol (NORDETTE) 0.15-30 MG-MCG per tablet; Take 1 tablet by mouth daily  Dispense: 84 tablet; Refill: 3    2. Vaginal discharge  Await remaining results and follow up as indicated.  - Neisseria gonorrhoeae PCR  - Chlamydia " trachomatis PCR  - Wet prep    3. BV (bacterial vaginosis)  Patient aware of results and prescription sent to pharmacy. Recommend no intercourse x 1 week.  - metroNIDAZOLE (METROGEL) 0.75 % vaginal gel; Place 1 applicator (5 g) vaginally At Bedtime for 5 days  Dispense: 70 g; Refill: 0    [facility]

## 2018-07-12 ENCOUNTER — OFFICE VISIT (OUTPATIENT)
Dept: OBGYN | Facility: CLINIC | Age: 23
End: 2018-07-12
Payer: COMMERCIAL

## 2018-07-12 VITALS
HEART RATE: 83 BPM | WEIGHT: 138.8 LBS | TEMPERATURE: 97 F | HEIGHT: 64 IN | BODY MASS INDEX: 23.7 KG/M2 | DIASTOLIC BLOOD PRESSURE: 75 MMHG | OXYGEN SATURATION: 98 % | SYSTOLIC BLOOD PRESSURE: 122 MMHG

## 2018-07-12 DIAGNOSIS — N89.8 VAGINAL DISCHARGE: ICD-10-CM

## 2018-07-12 DIAGNOSIS — A74.9 CHLAMYDIA INFECTION: ICD-10-CM

## 2018-07-12 DIAGNOSIS — B96.89 BV (BACTERIAL VAGINOSIS): ICD-10-CM

## 2018-07-12 DIAGNOSIS — Z30.011 ENCOUNTER FOR INITIAL PRESCRIPTION OF CONTRACEPTIVE PILLS: Primary | ICD-10-CM

## 2018-07-12 DIAGNOSIS — N76.0 BV (BACTERIAL VAGINOSIS): ICD-10-CM

## 2018-07-12 LAB
SPECIMEN SOURCE: ABNORMAL
WET PREP SPEC: ABNORMAL

## 2018-07-12 PROCEDURE — 87591 N.GONORRHOEAE DNA AMP PROB: CPT | Performed by: NURSE PRACTITIONER

## 2018-07-12 PROCEDURE — 99213 OFFICE O/P EST LOW 20 MIN: CPT | Performed by: NURSE PRACTITIONER

## 2018-07-12 PROCEDURE — 87491 CHLMYD TRACH DNA AMP PROBE: CPT | Performed by: NURSE PRACTITIONER

## 2018-07-12 PROCEDURE — 87210 SMEAR WET MOUNT SALINE/INK: CPT | Performed by: NURSE PRACTITIONER

## 2018-07-12 RX ORDER — LEVONORGESTREL AND ETHINYL ESTRADIOL 0.15-0.03
1 KIT ORAL DAILY
Qty: 84 TABLET | Refills: 3 | Status: SHIPPED | OUTPATIENT
Start: 2018-07-12 | End: 2019-02-27

## 2018-07-12 RX ORDER — METRONIDAZOLE 7.5 MG/G
1 GEL VAGINAL AT BEDTIME
Qty: 70 G | Refills: 0 | Status: SHIPPED | OUTPATIENT
Start: 2018-07-12 | End: 2018-07-17

## 2018-07-12 ASSESSMENT — PAIN SCALES - GENERAL: PAINLEVEL: NO PAIN (0)

## 2018-07-12 NOTE — NURSING NOTE
"Chief Complaint   Patient presents with     Contraception       Initial /75  Pulse 83  Temp 97  F (36.1  C) (Oral)  Ht 5' 4\" (1.626 m)  Wt 138 lb 12.8 oz (63 kg)  LMP 07/05/2018 (Exact Date)  SpO2 98%  BMI 23.82 kg/m2 Estimated body mass index is 23.82 kg/(m^2) as calculated from the following:    Height as of this encounter: 5' 4\" (1.626 m).    Weight as of this encounter: 138 lb 12.8 oz (63 kg)..  BP completed using cuff size: franki Damon CMA    "

## 2018-07-12 NOTE — MR AVS SNAPSHOT
"              After Visit Summary   2018    Maria Del Rosario Barger    MRN: [id number]           Patient Information     Date Of Birth          1995        Visit Information        Provider Department      2018 9:10 AM Lydia Lucio APRN CNP [facility]        Today's Diagnoses     Encounter for initial prescription of contraceptive pills    -  1    Vaginal discharge        BV (bacterial vaginosis)           Follow-ups after your visit        Who to contact     If you have questions or need follow up information about today's clinic visit or your schedule please contact [facility] directly at [phone].  Normal or non-critical lab and imaging results will be communicated to you by [employer], letter or phone within 4 business days after the clinic has received the results. If you do not hear from us within 7 days, please contact the clinic through [employer] or phone. If you have a critical or abnormal lab result, we will notify you by phone as soon as possible.  Submit refill requests through [employer] or call your pharmacy and they will forward the refill request to us. Please allow 3 business days for your refill to be completed.          Additional Information About Your Visit        MyChart Information     [employer] lets you send messages to your doctor, view your test results, renew your prescriptions, schedule appointments and more. To sign up, go to [email or web address] . Click on \"Log in\" on the left side of the screen, which will take you to the Welcome page. Then click on \"Sign up Now\" on the right side of the page.     You will be asked to enter the access code listed below, as well as some personal information. Please follow the directions to create your username and password.     Your access code is: [email or web address]  Expires: 10/10/2018  9:12 AM     Your access code will  in 90 days. If you need help or a new code, please call your [facility] or " "[phone].        Care EveryWhere ID     This is your Care EveryWhere ID. This could be used by other organizations to access your [facility] medical records  [id number]        Your Vitals Were     Pulse Temperature Height Last Period Pulse Oximetry BMI (Body Mass Index)    83 97  F (36.1  C) (Oral) 5' 4\" (1.626 m) 07/05/2018 (Exact Date) 98% 23.82 kg/m2       Blood Pressure from Last 3 Encounters:   07/12/18 122/75   05/05/18 122/74   04/26/18 118/72    Weight from Last 3 Encounters:   07/12/18 138 lb 12.8 oz (63 kg)   05/05/18 145 lb (65.8 kg)   04/26/18 145 lb (65.8 kg)              We Performed the Following     Chlamydia trachomatis PCR     Neisseria gonorrhoeae PCR     Wet prep          Today's Medication Changes          These changes are accurate as of 7/12/18 10:34 AM.  If you have any questions, ask your nurse or doctor.               Start taking these medicines.        Dose/Directions    levonorgestrel-ethinyl estradiol 0.15-30 MG-MCG per tablet   Commonly known as:  STANLEY   Used for:  Encounter for initial prescription of contraceptive pills   Started by:  Lydia Lucio APRN CNP        Dose:  1 tablet   Take 1 tablet by mouth daily   Quantity:  84 tablet   Refills:  3       metroNIDAZOLE 0.75 % vaginal gel   Commonly known as:  METROGEL   Used for:  BV (bacterial vaginosis)   Started by:  Lydia Lucio APRN CNP        Dose:  1 applicator   Place 1 applicator (5 g) vaginally At Bedtime for 5 days   Quantity:  70 g   Refills:  0         Stop taking these medicines if you haven't already. Please contact your care team if you have questions.     albuterol 108 (90 Base) MCG/ACT Inhaler   Commonly known as:  PROAIR HFA/PROVENTIL HFA/VENTOLIN HFA   Stopped by:  Lydia Lucio APRN CNP           cholecalciferol 40986 units capsule   Commonly known as:  VITAMIN D3   Stopped by:  Lydia Lucio APRN CNP                Where to get your medicines      [facility] " [facility] [place], [place] - [address]  [address] [address]    Hours:  24-hours Phone:  [phone]     levonorgestrel-ethinyl estradiol 0.15-30 MG-MCG per tablet    metroNIDAZOLE 0.75 % vaginal gel                Primary Care Provider Office Phone # Fax #    [facility] [phone] [address] [address] [address]        Equal Access to Services     [facility]: Hadii aad ku hadasho Soomaali, waaxda luqadaha, qaybta kaalmada adeegyada, faith kelsey . So [facility] [phone].    ATENCIÓN: Si habla español, tiene a coleman disposición servicios gratuitos de asistencia lingüística. [facility] [phone].    We comply with applicable federal civil rights laws and [place] laws. We do not discriminate on the basis of race, color, national origin, age, disability, sex, sexual orientation, or gender identity.            Thank you!     Thank you for choosing [facility]  for your care. Our goal is always to provide you with excellent care. Hearing back from our patients is one way we can continue to improve our services. Please take a few minutes to complete the written survey that you may receive in the mail after your visit with us. Thank you!             Your Updated Medication List - Protect others around you: Learn how to safely use, store and throw away your medicines at www.disposemymeds.org.          This list is accurate as of 7/12/18 10:34 AM.  Always use your most recent med list.                   Brand Name Dispense Instructions for use Diagnosis    levonorgestrel-ethinyl estradiol 0.15-30 MG-MCG per tablet    NORDETTE    84 tablet    Take 1 tablet by mouth daily    Encounter for initial prescription of contraceptive pills       metroNIDAZOLE 0.75 % vaginal gel    METROGEL    70 g    Place 1 applicator (5 g) vaginally At Bedtime for 5  days    BV (bacterial vaginosis)

## 2018-07-13 LAB
C TRACH DNA SPEC QL NAA+PROBE: POSITIVE
N GONORRHOEA DNA SPEC QL NAA+PROBE: NEGATIVE
SPECIMEN SOURCE: ABNORMAL
SPECIMEN SOURCE: NORMAL

## 2018-07-13 RX ORDER — AZITHROMYCIN 500 MG/1
1000 TABLET, FILM COATED ORAL ONCE
Qty: 2 TABLET | Refills: 0 | Status: SHIPPED | OUTPATIENT
Start: 2018-07-13 | End: 2018-07-13

## 2018-08-09 ENCOUNTER — OFFICE VISIT (OUTPATIENT)
Dept: FAMILY MEDICINE | Facility: CLINIC | Age: 23
End: 2018-08-09
Payer: COMMERCIAL

## 2018-08-09 VITALS
WEIGHT: 140 LBS | HEART RATE: 90 BPM | TEMPERATURE: 99 F | SYSTOLIC BLOOD PRESSURE: 123 MMHG | BODY MASS INDEX: 24.03 KG/M2 | OXYGEN SATURATION: 97 % | DIASTOLIC BLOOD PRESSURE: 69 MMHG | RESPIRATION RATE: 16 BRPM

## 2018-08-09 DIAGNOSIS — J01.80 OTHER ACUTE SINUSITIS, RECURRENCE NOT SPECIFIED: Primary | ICD-10-CM

## 2018-08-09 PROCEDURE — 99213 OFFICE O/P EST LOW 20 MIN: CPT | Performed by: FAMILY MEDICINE

## 2018-08-09 RX ORDER — AMOXICILLIN 875 MG
875 TABLET ORAL 2 TIMES DAILY
Qty: 20 TABLET | Refills: 0 | Status: SHIPPED | OUTPATIENT
Start: 2018-08-09 | End: 2018-08-19

## 2018-08-09 ASSESSMENT — PAIN SCALES - GENERAL: PAINLEVEL: NO PAIN (0)

## 2018-08-09 NOTE — MR AVS SNAPSHOT
After Visit Summary   8/9/2018    Maria Del Rosario Barger    MRN: [id number]           Patient Information     Date Of Birth          1995        Visit Information        Provider Department      8/9/2018 12:00 PM Adams Weber MD [facility]        Today's Diagnoses     Other acute sinusitis, recurrence not specified    -  1      Care Instructions      Sinusitis (Antibiotic Treatment)    The sinuses are air-filled spaces within the bones of the face. They connect to the inside of the nose. Sinusitis is an inflammation of the tissue that lines the sinuses. Sinusitis can occur during a cold. It can also happen due to allergies to pollens and other particles in the air. Sinusitis can cause symptoms of sinus congestion and a feeling of fullness. A sinus infection causes fever, headache, and facial pain. There is often green or yellow fluid draining from the nose or into the back of the throat (post-nasal drip). You have been given antibiotics to treat this condition.  Home care    Take the full course of antibiotics as instructed. Do not stop taking them, even when you feel better.    Drink plenty of water, hot tea, and other liquids. This may help thin nasal mucus. It also may help your sinuses drain fluids.    Heat may help soothe painful areas of your face. Use a towel soaked in hot water. Or,  the shower and direct the warm spray onto your face. Using a vaporizer along with a menthol rub at night may also help soothe symptoms.     An expectorant with guaifenesin may help thin nasal mucus and help your sinuses drain fluids.    You can use an over-the-counter decongestant, unless a similar medicine was prescribed to you. Nasal sprays work the fastest. Use one that contains phenylephrine or oxymetazoline. First blow your nose gently. Then use the spray. Do not use these medicines more often than directed on the label. If you do, your symptoms may get worse. You may also take pills that  contain pseudoephedrine. Don t use products that combine multiple medicines. This is because side effects may be increased. Read labels. You can also ask the pharmacist for help. (People with high blood pressure should not use decongestants. They can raise blood pressure.)    Over-the-counter antihistamines may help if allergies contributed to your sinusitis.      Do not use nasal rinses or irrigation during an acute sinus infection, unless your healthcare provider tells you to. Rinsing may spread the infection to other areas in your sinuses.    Use acetaminophen or ibuprofen to control pain, unless another pain medicine was prescribed to you. If you have chronic liver or kidney disease or ever had a stomach ulcer, talk with your healthcare provider before using these medicines. (Aspirin should never be taken by anyone under age 18 who is ill with a fever. It may cause severe liver damage.)    Don't smoke. This can make symptoms worse.  Follow-up care  Follow up with your healthcare provider or our staff if you are better in 1 week.  When to seek medical advice  Call your healthcare provider if any of these occur:    Facial pain or headache that gets worse    Stiff neck    Unusual drowsiness or confusion    Swelling of your forehead or eyelids    Vision problems, such as blurred or double vision    Fever of 100.4 F (38 C) or higher, or as directed by your healthcare provider    Seizure    Breathing problems    Symptoms don't go away in 10 days  Prevention  Here are steps you can take to help prevent an infection:    Keep good hand washing habits.    Don t have close contact with people who have sore throats, colds, or other upper respiratory infections.    Don t smoke, and stay away from secondhand smoke.    Stay up to date with of your vaccines.  Date Last Reviewed: 11/1/2017 2000-2017 [employer]. [address] [place] [address], [place] [address]. All rights reserved. This information is not intended as a  "substitute for professional medical care. Always follow your healthcare professional's instructions.                Follow-ups after your visit        Who to contact     If you have questions or need follow up information about today's clinic visit or your schedule please contact [facility] directly at [phone].  Normal or non-critical lab and imaging results will be communicated to you by MyChart, letter or phone within 4 business days after the clinic has received the results. If you do not hear from us within 7 days, please contact the clinic through MyChart or phone. If you have a critical or abnormal lab result, we will notify you by phone as soon as possible.  Submit refill requests through [employer] or call your pharmacy and they will forward the refill request to us. Please allow 3 business days for your refill to be completed.          Additional Information About Your Visit        MyChart Information     [employer] lets you send messages to your doctor, view your test results, renew your prescriptions, schedule appointments and more. To sign up, go to [email or web address] . Click on \"Log in\" on the left side of the screen, which will take you to the Welcome page. Then click on \"Sign up Now\" on the right side of the page.     You will be asked to enter the access code listed below, as well as some personal information. Please follow the directions to create your username and password.     Your access code is: [email or web address]  Expires: 10/20/2018 10:50 AM     Your access code will  in 90 days. If you need help or a new code, please call your [facility] or [phone].        Care EveryWhere ID     This is your Care EveryWhere ID. This could be used by other organizations to access your [place] medical records  [id number]        Your Vitals Were     Pulse Temperature Respirations Pulse Oximetry Breastfeeding? BMI (Body Mass Index)    90 99  F (37.2  C) (Oral) 16 97% No 24.03 kg/m2 "       Blood Pressure from Last 3 Encounters:   08/09/18 123/69   07/12/18 122/75   05/05/18 122/74    Weight from Last 3 Encounters:   08/09/18 140 lb (63.5 kg)   07/12/18 138 lb 12.8 oz (63 kg)   05/05/18 145 lb (65.8 kg)              Today, you had the following     No orders found for display         Today's Medication Changes          These changes are accurate as of 8/9/18 11:59 PM.  If you have any questions, ask your nurse or doctor.               Start taking these medicines.        Dose/Directions    amoxicillin 875 MG tablet   Commonly known as:  AMOXIL   Used for:  Other acute sinusitis, recurrence not specified   Started by:  Adams Weber MD        Dose:  875 mg   Take 1 tablet (875 mg) by mouth 2 times daily for 10 days   Quantity:  20 tablet   Refills:  0            Where to get your medicines      These medications were sent to [facility] [address] [place] [address] & [facility]  [address] [facility] [address]    Hours:  24-hours Phone:  [phone]     amoxicillin 875 MG tablet                Primary Care Provider Office Phone # Fax #    [facility] [phone] [address] [facility] [address]        Equal Access to Services     [facility] AH: Hadii cesario ku hadasho Soomaali, waaxda luqadaha, qaybta kaalmada adeegyada, faith hamm. So [facility] [place] [facility] [phone].    ATENCIÓN: Si habla español, tiene a coleman disposición servicios gratuitos de asistencia lingüística. Llame al [phone].    We comply with applicable federal civil rights laws and [place] laws. We do not discriminate on the basis of race, color, national origin, age, disability, sex, sexual orientation, or gender identity.            Thank you!     Thank you for choosing [facility]  for your care. Our goal is always to provide you with excellent care. Hearing back from our patients is one way we  can continue to improve our services. Please take a few minutes to complete the written survey that you may receive in the mail after your visit with us. Thank you!             Your Updated Medication List - Protect others around you: Learn how to safely use, store and throw away your medicines at www.disposemymeds.org.          This list is accurate as of 8/9/18 11:59 PM.  Always use your most recent med list.                   Brand Name Dispense Instructions for use Diagnosis    amoxicillin 875 MG tablet    AMOXIL    20 tablet    Take 1 tablet (875 mg) by mouth 2 times daily for 10 days    Other acute sinusitis, recurrence not specified       levonorgestrel-ethinyl estradiol 0.15-30 MG-MCG per tablet    NORDETTE    84 tablet    Take 1 tablet by mouth daily    Encounter for initial prescription of contraceptive pills

## 2018-08-09 NOTE — LETTER
[facility]  [address] [facility] [address]  Phone: [phone]    August 9, 2018        Maria Del Rosario Barger  [address]  [facility] [address]          To whom it may concern:    RE: Maria Del Rosario Barger    Patient was seen and treated today at our clinic and missed work.  Patient may return to work on 8/11/2018.        Sincerely,        Adams Weber MD

## 2018-08-09 NOTE — PATIENT INSTRUCTIONS

## 2018-08-10 NOTE — PROGRESS NOTES
SUBJECTIVE:  Maria Del Rosario Barger, a 23 year old female scheduled an appointment to discuss the following issues:  Other acute sinusitis, recurrence not specified    Medical, social, surgical, and family histories reviewed.    Sinus Problem  pt c/o sinus pressure, congestion and cough for 3 days      As above.  Also has cough, stuffy nose for 1 week.  No sputum.  Home urine pregnancy test today negative.  No hx of asthma.    ROS:  See HPI.  No nausea/vomiting.  No fever/chills.  No chest pain/SOB.  No BM/urine problems.  No dizziness or syncope.      OBJECTIVE:  /69  Pulse 90  Temp 99  F (37.2  C) (Oral)  Resp 16  Wt 140 lb (63.5 kg)  SpO2 97%  Breastfeeding? No  BMI 24.03 kg/m2  EXAM:  GENERAL APPEARANCE:  alert and mild distress; no cyanosis or accessory muscle use; moist mucus membrane  EYES: Eyes grossly normal to inspection, PERRL and conjunctivae and sclerae normal  HENT: ear canals and TM's normal; inflamed nasal mucosa with bilateral maxillary sinus tenderness; mouth without ulcers or lesions  NECK: no adenopathy, no asymmetry, masses, or scars and thyroid normal to palpation  RESP: clear lung fields, no crackles or wheezes or rhonchi  CV: regular rates and rhythm, normal S1 S2, no S3 or S4 and no murmur, click or rub  LYMPHATICS: no cervical adenopathy  ABDOMEN: soft, nontender, without hepatosplenomegaly or masses and bowel sounds normal  MS: extremities normal- no gross deformities noted  SKIN: no suspicious lesions or rashes  NEURO: Normal strength and tone, mentation intact and speech normal      ASSESSMENT/PLAN:  (J01.80) Other acute sinusitis, recurrence not specified  (primary encounter diagnosis)  Plan: amoxicillin (AMOXIL) 875 MG tablet    Fluid, rest.  Care instructions given.  Pt to f/up PCP if no improvement or worsening.  Warning signs and symptoms explained.

## 2018-08-21 ENCOUNTER — OFFICE VISIT (OUTPATIENT)
Dept: OBGYN | Facility: CLINIC | Age: 23
End: 2018-08-21
Payer: COMMERCIAL

## 2018-08-21 VITALS
SYSTOLIC BLOOD PRESSURE: 117 MMHG | TEMPERATURE: 97.9 F | HEART RATE: 83 BPM | BODY MASS INDEX: 24.79 KG/M2 | DIASTOLIC BLOOD PRESSURE: 72 MMHG | WEIGHT: 144.4 LBS

## 2018-08-21 DIAGNOSIS — N92.6 MISSED MENSES: Primary | ICD-10-CM

## 2018-08-21 LAB
B-HCG SERPL-ACNC: <1 IU/L (ref 0–5)
BETA HCG QUAL IFA URINE: NEGATIVE

## 2018-08-21 PROCEDURE — 99213 OFFICE O/P EST LOW 20 MIN: CPT | Performed by: NURSE PRACTITIONER

## 2018-08-21 PROCEDURE — 84703 CHORIONIC GONADOTROPIN ASSAY: CPT | Performed by: NURSE PRACTITIONER

## 2018-08-21 PROCEDURE — 36415 COLL VENOUS BLD VENIPUNCTURE: CPT | Performed by: NURSE PRACTITIONER

## 2018-08-21 PROCEDURE — 84702 CHORIONIC GONADOTROPIN TEST: CPT | Performed by: NURSE PRACTITIONER

## 2018-08-21 NOTE — PROGRESS NOTES
SUBJECTIVE:   Pt is a 23 year old,  2 para 1 who presents today with absence of menses. LMP was 18. Patient was seen 18 for contraception and was waiting for cycle to start in order to begin pills, but no menses. Had a few negative home tests, 1 positive and then a few more negative. Waking up with nausea, but no other pregnancy symptoms.   Was treated at that time for BV and Chlamydia-she is due for test of cure, but declines it today as her son and SO are with her and does not want an exam. She left a urine sample after checking in and was not a dirty catch, so not able to run that for Chlamydia test of cure. Does state she will return to clinic soon to complete this.     Problem list and histories reviewed & adjusted, as indicated.  Additional history: as documented    Patient Active Problem List   Diagnosis     Menometrorrhagia     Adjustment disorder with anxious mood     Insomnia, unspecified insomnia     KEISHA (generalized anxiety disorder)     Family history of von Willebrand disease     Family history of factor V deficiency     Low vitamin D level     Past Surgical History:   Procedure Laterality Date     CHOLECYSTOSTOMY  2016     COMBINED ESOPHAGOSCOPY, GASTROSCOPY, DUODENOSCOPY (EGD) WITH CO2 INSUFFLATION Bilateral 2016    Procedure: COMBINED ESOPHAGOSCOPY, GASTROSCOPY, DUODENOSCOPY (EGD) WITH CO2 INSUFFLATION;  Surgeon: Warren Fernandez MD;  Location: [facility] OR     ESOPHAGOSCOPY, GASTROSCOPY, DUODENOSCOPY (EGD), COMBINED N/A 2016    Procedure: COMBINED ESOPHAGOSCOPY, GASTROSCOPY, DUODENOSCOPY (EGD), BIOPSY SINGLE OR MULTIPLE;  Surgeon: Warren Fernandez MD;  Location: [facility] OR     TONSILLECTOMY       trigger thumb         Social History   Substance Use Topics     Smoking status: Current Every Day Smoker     Packs/day: 1.00     Types: Cigarettes     Smokeless tobacco: Never Used     Alcohol use Yes      Comment: occasional      History reviewed. No pertinent family  history.        Reviewed and updated as needed this visit by clinical staff  Tobacco  Allergies  Meds  Med Hx  Surg Hx  Fam Hx  Soc Hx      Reviewed and updated as needed this visit by Provider         ROS:  Constitutional, HEENT, cardiovascular, pulmonary, gi and gu systems are negative, except as otherwise noted.    OBJECTIVE:     /72  Pulse 83  Temp 97.9  F (36.6  C) (Tympanic)  Wt 144 lb 6.4 oz (65.5 kg)  LMP 07/04/2018 (Exact Date)  Breastfeeding? No  BMI 24.79 kg/m2  Body mass index is 24.79 kg/(m^2).  GENERAL: healthy, alert and no distress  RESP: lungs clear to auscultation - no rales, rhonchi or wheezes  CV: regular rate and rhythm, normal S1 S2, no S3 or S4, no murmur, click or rub, no peripheral edema and peripheral pulses strong  ABDOMEN: soft, nontender, no hepatosplenomegaly, no masses and bowel sounds normal  MS: no gross musculoskeletal defects noted, no edema  SKIN: no suspicious lesions or rashes  PSYCH: mentation appears normal, affect normal/bright    Diagnostic Test Results:  Results for orders placed or performed in visit on 08/21/18 (from the past 24 hour(s))   Beta HCG Qual, Urine - [facility] ([id number])   Result Value Ref Range    Beta HCG Qual IFA Urine Negative NEG^Negative          ASSESSMENT/PLAN:   1. Missed menses  Discussed negative urine test. Plan Quant HCG. If negative, patient will start oral contraceptive pills that were previously prescribed.   If positive, will likely need repeat to ensure it is increasing appropriately.   - Beta HCG Qual, Urine - [facility] ([id number])  - HCG quantitative pregnancy    MAGED Hoyt [facility]

## 2018-08-21 NOTE — PROGRESS NOTES
S: Pt is a 23 year old,  2 para 1 who presents today with absence of menses. LMP was 18.  *** for contraception and was discontinued ***.  Complains of ***  Previous Pap smear ***.  ***history of abnormal pap smear.  Pertinent Past Obstetric and Gynecological Medical History: ***   Patient past medical, surgical, family, and social history reviewed.    O: General appearance: Well appearing female in no acute distress. Answers questions and maintains eye contact appropriately.  RESPIRATORY: Clear to auscultation bilaterally.  CV: Regular rate and rhythm without murmur, gallop, rub  ABDOMEN: Soft, nontender, nondistended, normoactive bowel sounds. No hepatosplenomegaly. No guarding, rebounding, or rigidity.  UPT ***    A: Missed Menses  Weeks gestation: 6 weeks 6 days  MOLLY: 4/10/19    P: 1) Prenatal vitamins ***  2) Diet, exercise, work, and environmental hazards reviewed. Discussed delivery hospital, providers, prenatal visit schedule. Early ultrasound ordered to confirm ***. Toxoplasmosis precautions ***. Discussed avoidance of tobacco, ETOH, and street drugs. Signs and symptoms to monitor for and report discussed. Will schedule first OB visit at 10-12 weeks gestation.     Lydia LYNNE CNP

## 2018-08-21 NOTE — LETTER
[facility]  [address] [facility] [address]  Phone: [phone]    08/22/18    Maria Del Rosario Barger  [address]  [place] [place] [address]      Dear Maria Del Rosario-    Your blood pregnancy test is negative. You can go ahead and start the birth control as we discussed at your visit. Please let me know if you have any questions.     Sincerely,      MAGED Hoyt CNP

## 2018-08-21 NOTE — MR AVS SNAPSHOT
After Visit Summary   8/21/2018    Maria Del Rosario Barger    MRN: [id number]           Patient Information     Date Of Birth          1995        Visit Information        Provider Department      8/21/2018 10:50 AM Lydia Lucio APRN CNP [facility]        Today's Diagnoses     Missed menses    -  1       Follow-ups after your visit        Who to contact     If you have questions or need follow up information about today's clinic visit or your schedule please contact [facility] directly at [phone].  Normal or non-critical lab and imaging results will be communicated to you by MyChart, letter or phone within 4 business days after the clinic has received the results. If you do not hear from us within 7 days, please contact the clinic through MyChart or phone. If you have a critical or abnormal lab result, we will notify you by phone as soon as possible.  Submit refill requests through [employer] or call your pharmacy and they will forward the refill request to us. Please allow 3 business days for your refill to be completed.          Additional Information About Your Visit        Care EveryWhere ID     This is your Care EveryWhere ID. This could be used by other organizations to access your [place] medical records  [id number]        Your Vitals Were     Pulse Temperature Last Period Breastfeeding? BMI (Body Mass Index)       83 97.9  F (36.6  C) (Tympanic) 07/04/2018 (Exact Date) No 24.79 kg/m2        Blood Pressure from Last 3 Encounters:   08/21/18 117/72   08/09/18 123/69   07/12/18 122/75    Weight from Last 3 Encounters:   08/21/18 144 lb 6.4 oz (65.5 kg)   08/09/18 140 lb (63.5 kg)   07/12/18 138 lb 12.8 oz (63 kg)              We Performed the Following     Beta HCG Qual, Urine - [facility] ([id number])     HCG quantitative pregnancy        Primary Care Provider Office Phone # Fax #    [facility] [phone] [address]  [facility] [address]        Equal Access to Services     [facility] ROCÍO : Hadii aad ku haddenismic Thapa, waneoda earleloisha, qajeffta nnamdimafaith so. So [facility] [phone].    ATENCIÓN: Si habla español, tiene a coleman disposición servicios gratuitos de asistencia lingüística. [facility] [phone].    We comply with applicable federal civil rights laws and [place] laws. We do not discriminate on the basis of race, color, national origin, age, disability, sex, sexual orientation, or gender identity.            Thank you!     Thank you for choosing [facility]  for your care. Our goal is always to provide you with excellent care. Hearing back from our patients is one way we can continue to improve our services. Please take a few minutes to complete the written survey that you may receive in the mail after your visit with us. Thank you!             Your Updated Medication List - Protect others around you: Learn how to safely use, store and throw away your medicines at www.disposemymeds.org.          This list is accurate as of 8/21/18 12:26 PM.  Always use your most recent med list.                   Brand Name Dispense Instructions for use Diagnosis    levonorgestrel-ethinyl estradiol 0.15-30 MG-MCG per tablet    STANLEY    84 tablet    Take 1 tablet by mouth daily    Encounter for initial prescription of contraceptive pills

## 2018-08-24 ENCOUNTER — TELEPHONE (OUTPATIENT)
Dept: OBGYN | Facility: CLINIC | Age: 23
End: 2018-08-24

## 2018-08-24 NOTE — TELEPHONE ENCOUNTER
I called patient.  A letter has already been sent but I did inform her that the blood pregnancy test was negative and she can go ahead and start her birth control as discussed.  Sara Nolen, [facility]  August 24, 2018 11:53 AM

## 2018-09-14 ENCOUNTER — OFFICE VISIT (OUTPATIENT)
Dept: OBGYN | Facility: CLINIC | Age: 23
End: 2018-09-14
Payer: COMMERCIAL

## 2018-09-14 VITALS
HEIGHT: 64 IN | BODY MASS INDEX: 24.45 KG/M2 | WEIGHT: 143.2 LBS | TEMPERATURE: 98.5 F | OXYGEN SATURATION: 96 % | DIASTOLIC BLOOD PRESSURE: 72 MMHG | SYSTOLIC BLOOD PRESSURE: 117 MMHG | HEART RATE: 80 BPM

## 2018-09-14 DIAGNOSIS — A74.9 CHLAMYDIA INFECTION: Primary | ICD-10-CM

## 2018-09-14 DIAGNOSIS — N92.1 METRORRHAGIA: ICD-10-CM

## 2018-09-14 LAB — BETA HCG QUAL IFA URINE: NEGATIVE

## 2018-09-14 PROCEDURE — 99213 OFFICE O/P EST LOW 20 MIN: CPT | Performed by: NURSE PRACTITIONER

## 2018-09-14 PROCEDURE — 87491 CHLMYD TRACH DNA AMP PROBE: CPT | Performed by: NURSE PRACTITIONER

## 2018-09-14 PROCEDURE — 84703 CHORIONIC GONADOTROPIN ASSAY: CPT | Performed by: NURSE PRACTITIONER

## 2018-09-14 ASSESSMENT — PAIN SCALES - GENERAL: PAINLEVEL: NO PAIN (0)

## 2018-09-14 NOTE — PROGRESS NOTES
"  SUBJECTIVE:   Maria Del Rosario Barger is a 23 year old female who presents to clinic today for the following health issues:      Vaginal Symptoms  Onset: ***    Description:  Vaginal Discharge: {.:095352::\"none\"}   Itching (Pruritis): { :503981}  Burning sensation:  { :947637}  Odor: { :810258}    Accompanying Signs & Symptoms:  Pain with Urination: { :395167}  Abdominal Pain: { :867423}  Fever: { :823280}    History:   Sexually active: { :521935}  New Partner: { :096782}  Possibility of Pregnancy:  { :798784::\"No\"}    Precipitating factors:   Recent Antibiotic Use: { :916224}    Alleviating factors:  ***    Therapies Tried and outcome: ***    {additional problems for provider to add:309051}    Problem list and histories reviewed & adjusted, as indicated.  Additional history: {NONE - AS DOCUMENTED:239199::\"as documented\"}    {HIST REVIEW/ LINKS 2:520364}    Reviewed and updated as needed this visit by clinical staff       Reviewed and updated as needed this visit by Provider         {PROVIDER CHARTING PREFERENCE:085829}  "

## 2018-09-14 NOTE — PROGRESS NOTES
SUBJECTIVE:   Maria Del Rosario Barger is a 23 year old female who presents to clinic today for the following health issues:    Abnormal vaginal bleeding x 2 months    Patient was seen in July for contraception counseling. At the time, was given prescription for oral contraceptive pills and was waiting for menses to start them. At that time, was  + for Chlamydia and treated.   Seen 8/21 due to no menses. UPT was negative and patient was to start oral contraceptive pills at that time. Had declined Chlamydia follow up at that visit.  Menses began day after visit, normal flow, length of time. Did not start pills as she had planned. Then last weekend, started having spotting with wiping only for 3 days. Nothing since.    Problem list and histories reviewed & adjusted, as indicated.  Additional history: as documented    Patient Active Problem List   Diagnosis     Menometrorrhagia     Adjustment disorder with anxious mood     Insomnia, unspecified insomnia     KEISHA (generalized anxiety disorder)     Family history of von Willebrand disease     Family history of factor V deficiency     Low vitamin D level     Past Surgical History:   Procedure Laterality Date     CHOLECYSTOSTOMY  07/2016     COMBINED ESOPHAGOSCOPY, GASTROSCOPY, DUODENOSCOPY (EGD) WITH CO2 INSUFFLATION Bilateral 7/25/2016    Procedure: COMBINED ESOPHAGOSCOPY, GASTROSCOPY, DUODENOSCOPY (EGD) WITH CO2 INSUFFLATION;  Surgeon: Warren Fernandez MD;  Location: [facility] OR     ESOPHAGOSCOPY, GASTROSCOPY, DUODENOSCOPY (EGD), COMBINED N/A 7/25/2016    Procedure: COMBINED ESOPHAGOSCOPY, GASTROSCOPY, DUODENOSCOPY (EGD), BIOPSY SINGLE OR MULTIPLE;  Surgeon: Warren Fernandez MD;  Location: [facility] OR     TONSILLECTOMY       trigger thumb         Social History   Substance Use Topics     Smoking status: Current Every Day Smoker     Packs/day: 0.50     Types: Cigarettes     Smokeless tobacco: Never Used     Alcohol use Yes      Comment: occasional      History reviewed. No  "pertinent family history.        Reviewed and updated as needed this visit by clinical staff       Reviewed and updated as needed this visit by Provider         ROS:  Constitutional, HEENT, cardiovascular, pulmonary, gi and gu systems are negative, except as otherwise noted.    OBJECTIVE:     /72  Pulse 80  Temp 98.5  F (36.9  C) (Oral)  Ht 5' 4\" (1.626 m)  Wt 143 lb 3.2 oz (65 kg)  LMP 09/13/2018 (Exact Date)  SpO2 96%  BMI 24.58 kg/m2  Body mass index is 24.58 kg/(m^2).  GENERAL: healthy, alert and no distress  RESP: lungs clear to auscultation - no rales, rhonchi or wheezes  CV: regular rate and rhythm, normal S1 S2, no S3 or S4, no murmur, click or rub, no peripheral edema and peripheral pulses strong  ABDOMEN: soft, nontender, no hepatosplenomegaly, no masses and bowel sounds normal  MS: no gross musculoskeletal defects noted, no edema  SKIN: no suspicious lesions or rashes  PSYCH: mentation appears normal, affect normal/bright    Diagnostic Test Results:  Results for orders placed or performed in visit on 09/14/18 (from the past 24 hour(s))   Beta HCG Qual, Urine - [facility] [place] ([id number])   Result Value Ref Range    Beta HCG Qual IFA Urine Negative NEG^Negative          ASSESSMENT/PLAN:   1. Chlamydia infection  Follow up test of cure today to rule out infection as cause of spotting.  - Chlamydia trachomatis PCR    2. Metrorrhagia  As HCG negative, patient will start oral contraceptive pill this Sunday. Again reviewed instructions for use with her. Questions answered.  - Beta HCG Qual, Urine - [facility] [place] ([id number])    MAGED Hoyt [facility]  "

## 2018-09-14 NOTE — LETTER
[facility]  [address] [facility] [address]  Phone: [phone]    09/17/18    Maria Del Rosario Barger  [address]  [place] [place] [address]      Dear Maria Del Rosario-    Your follow up test is negative for an infection. Please let me know if you have any questions.     Sincerely,      MAGED Hoyt CNP

## 2018-09-14 NOTE — MR AVS SNAPSHOT
"              After Visit Summary   9/14/2018    Maria Del Rosario Barger    MRN: [id number]           Patient Information     Date Of Birth          1995        Visit Information        Provider Department      9/14/2018 11:50 AM Lydia Lucio APRN CNP [facility]        Today's Diagnoses     Chlamydia infection    -  1    Metrorrhagia           Follow-ups after your visit        Who to contact     If you have questions or need follow up information about today's clinic visit or your schedule please contact [facility] directly at [phone].  Normal or non-critical lab and imaging results will be communicated to you by MyChart, letter or phone within 4 business days after the clinic has received the results. If you do not hear from us within 7 days, please contact the clinic through MyChart or phone. If you have a critical or abnormal lab result, we will notify you by phone as soon as possible.  Submit refill requests through KILTR or call your pharmacy and they will forward the refill request to us. Please allow 3 business days for your refill to be completed.          Additional Information About Your Visit        Care EveryWhere ID     This is your Care EveryWhere ID. This could be used by other organizations to access your [facility] medical records  [id number]        Your Vitals Were     Pulse Temperature Height Last Period Pulse Oximetry BMI (Body Mass Index)    80 98.5  F (36.9  C) (Oral) 5' 4\" (1.626 m) 09/13/2018 (Exact Date) 96% 24.58 kg/m2       Blood Pressure from Last 3 Encounters:   09/14/18 117/72   08/21/18 117/72   08/09/18 123/69    Weight from Last 3 Encounters:   09/14/18 143 lb 3.2 oz (65 kg)   08/21/18 144 lb 6.4 oz (65.5 kg)   08/09/18 140 lb (63.5 kg)              We Performed the Following     Beta HCG Qual, Urine - [facility] [place] ([id number])     Chlamydia trachomatis PCR        Primary Care Provider Office Phone # Fax #    [facility] " [phone]       [address] [facility] [address]        Equal Access to Services     RM ALFORD : Hadii aad ku haddenismic Elier, kiarada ernstniels, danielata katracieda dena, faith lmin hayaan sebastiankia carpenter laAdonislaureen hamm. So [facility] [phone].    ATENCIÓN: Si habla español, tiene a coleman disposición servicios gratuitos de asistencia lingüística. Llame al [phone].    We comply with applicable federal civil rights laws and [place] laws. We do not discriminate on the basis of race, color, national origin, age, disability, sex, sexual orientation, or gender identity.            Thank you!     Thank you for choosing [facility] [place]  for your care. Our goal is always to provide you with excellent care. Hearing back from our patients is one way we can continue to improve our services. Please take a few minutes to complete the written survey that you may receive in the mail after your visit with us. Thank you!             Your Updated Medication List - Protect others around you: Learn how to safely use, store and throw away your medicines at www.disposemymeds.org.          This list is accurate as of 9/14/18  1:50 PM.  Always use your most recent med list.                   Brand Name Dispense Instructions for use Diagnosis    levonorgestrel-ethinyl estradiol 0.15-30 MG-MCG per tablet    STANLEY    84 tablet    Take 1 tablet by mouth daily    Encounter for initial prescription of contraceptive pills

## 2018-09-14 NOTE — NURSING NOTE
"Chief Complaint   Patient presents with     Vaginal Problem       Initial /72  Pulse 80  Temp 98.5  F (36.9  C) (Oral)  Ht 5' 4\" (1.626 m)  Wt 143 lb 3.2 oz (65 kg)  LMP 09/13/2018 (Exact Date)  SpO2 96%  BMI 24.58 kg/m2 Estimated body mass index is 24.58 kg/(m^2) as calculated from the following:    Height as of this encounter: 5' 4\" (1.626 m).    Weight as of this encounter: 143 lb 3.2 oz (65 kg)..  BP completed using cuff size: franki Damon CMA    "

## 2018-09-16 LAB
C TRACH DNA SPEC QL NAA+PROBE: NEGATIVE
SPECIMEN SOURCE: NORMAL

## 2018-09-18 ENCOUNTER — TELEPHONE (OUTPATIENT)
Dept: OBGYN | Facility: CLINIC | Age: 23
End: 2018-09-18

## 2018-09-18 NOTE — TELEPHONE ENCOUNTER
Patient stated that she is still bleeding after taking Birth control.   Patient wants to know what she should do.   Please call to advise  Thank you

## 2018-09-18 NOTE — TELEPHONE ENCOUNTER
Phone called placed to pt.  Pt c/o of irregular bleeding. Advised pt that her body could take up to 3 months to adjust to her BCP.  Pt stated that heavy irregular bleeding started prior to starting medication. BCP started   on 9/16/18 per pat.  Pt did state her and fiance want to have a child.  Scheduled appointment with provider. Reji Rodriguez RN on 9/18/2018 at 3:24 PM

## 2018-10-02 ENCOUNTER — TELEPHONE (OUTPATIENT)
Dept: OBGYN | Facility: CLINIC | Age: 23
End: 2018-10-02

## 2018-10-02 NOTE — TELEPHONE ENCOUNTER
Patient calling, her irregular bleeding is not any better and she is now having pain. She would like to discuss getting an ultrasound.

## 2018-10-03 NOTE — TELEPHONE ENCOUNTER
Patient has appointment scheduled for tomorrow. Would recommend she keep appointment-that way we can go over her ED records (I do not see them in EPIC/CE yet) and then based on that, determine if ultrasound would now be appropriate and can get ordered. Can also come up with plan for her bleeding. Thank you. Lydia LYNNE CNP

## 2018-10-03 NOTE — TELEPHONE ENCOUNTER
"Spoke with pt.  She states she never started the BCP that were prescribed to her on 9/14/18.  She states she would like to have a baby soon and doesn't want to be on birth control.  She states her periods continue to be abnormal and would like to get a pelvic ultrasound.  She was in the ED last night for some right sided \"buring sensation\".  They performed a CT scan and did blood work and everything looked fine.    Routing to Lydia Lucio CNP, for pelvic ultrasound orders if appropriate.    Gayathri Thomas RN      "

## 2018-10-03 NOTE — TELEPHONE ENCOUNTER
Patient returned clinics call, was in a meeting but will be avail the remainder of the day. Requesting a call back. Thank you

## 2018-10-03 NOTE — TELEPHONE ENCOUNTER
I called and spoke to patient she did not realize she had appointment. She requested to move appointment earlier if possible. I rescheduled to 9:10.  BREE Jeronimo 10/3/2018

## 2018-10-04 ENCOUNTER — OFFICE VISIT (OUTPATIENT)
Dept: OBGYN | Facility: CLINIC | Age: 23
End: 2018-10-04
Payer: COMMERCIAL

## 2018-10-04 VITALS
BODY MASS INDEX: 25.68 KG/M2 | WEIGHT: 150.4 LBS | SYSTOLIC BLOOD PRESSURE: 120 MMHG | HEIGHT: 64 IN | OXYGEN SATURATION: 96 % | TEMPERATURE: 98.6 F | DIASTOLIC BLOOD PRESSURE: 73 MMHG | HEART RATE: 85 BPM

## 2018-10-04 DIAGNOSIS — N92.6 IRREGULAR BLEEDING: Primary | ICD-10-CM

## 2018-10-04 PROCEDURE — 99213 OFFICE O/P EST LOW 20 MIN: CPT | Performed by: NURSE PRACTITIONER

## 2018-10-04 ASSESSMENT — PAIN SCALES - GENERAL: PAINLEVEL: NO PAIN (0)

## 2018-10-04 NOTE — NURSING NOTE
"Chief Complaint   Patient presents with     Hospital F/U       Initial /73  Pulse 85  Temp 98.6  F (37  C) (Oral)  Ht 5' 4\" (1.626 m)  Wt 150 lb 6.4 oz (68.2 kg)  LMP 09/13/2018 (Exact Date)  SpO2 96%  BMI 25.82 kg/m2 Estimated body mass index is 25.82 kg/(m^2) as calculated from the following:    Height as of this encounter: 5' 4\" (1.626 m).    Weight as of this encounter: 150 lb 6.4 oz (68.2 kg)..  BP completed using cuff size: franki Damon CMA    "

## 2018-10-04 NOTE — PROGRESS NOTES
SUBJECTIVE:   Maria Del Rosario Barger is a 23 year old female who presents to clinic today for the following health issues:      ED Followup:    Facility:  [facility]  Date of visit: 10/02/2018  Reason for visit: Right lower quadrant abdominal pain  Current Status: pain subsided     Patient was seen in [facility] ED 2 days ago with right lower quadrant pain. Work up did not find cause of her pain, but it has since resolved.  She has continued to have irregular bleeding.  Seen in July to discuss contraception and prescription was given for oral contraceptive pills. Was waiting for menses and did not come for almost 2 months.  Normal cycle 7/5/18 (prior to appointment) and then next cycle began on 8/22/18. Patient was instructed at that visit to start her pills, but did not. Bled for 4-5 days that time, again 9/5/18 for 2 days and then again 9/13/18 for 7 days. No further bleeding since.  Never did start the oral contraceptive pills.  Was treated for Chlamydia this summer as well and follow up testing was negative.  Patient has now decided they want to try for pregnancy, but are concerned due to her irregular bleeding. Continues to smoke.  CT scan in the ED was normal.     Problem list and histories reviewed & adjusted, as indicated.  Additional history: as documented    Patient Active Problem List   Diagnosis     Menometrorrhagia     Adjustment disorder with anxious mood     Insomnia, unspecified insomnia     KEISHA (generalized anxiety disorder)     Family history of von Willebrand disease     Family history of factor V deficiency     Low vitamin D level     Past Surgical History:   Procedure Laterality Date     CHOLECYSTOSTOMY  07/2016     COMBINED ESOPHAGOSCOPY, GASTROSCOPY, DUODENOSCOPY (EGD) WITH CO2 INSUFFLATION Bilateral 7/25/2016    Procedure: COMBINED ESOPHAGOSCOPY, GASTROSCOPY, DUODENOSCOPY (EGD) WITH CO2 INSUFFLATION;  Surgeon: Warren Fernandez MD;  Location:  OR     ESOPHAGOSCOPY, GASTROSCOPY, DUODENOSCOPY (EGD),  "COMBINED N/A 7/25/2016    Procedure: COMBINED ESOPHAGOSCOPY, GASTROSCOPY, DUODENOSCOPY (EGD), BIOPSY SINGLE OR MULTIPLE;  Surgeon: Warren Fernandez MD;  Location: [facility] OR     TONSILLECTOMY       trigger thumb         Social History   Substance Use Topics     Smoking status: Current Every Day Smoker     Packs/day: 0.50     Types: Cigarettes     Smokeless tobacco: Never Used     Alcohol use Yes      Comment: occasional      History reviewed. No pertinent family history.        Reviewed and updated as needed this visit by clinical staff       Reviewed and updated as needed this visit by Provider         ROS:  Constitutional, HEENT, cardiovascular, pulmonary, gi and gu systems are negative, except as otherwise noted.    OBJECTIVE:     /73  Pulse 85  Temp 98.6  F (37  C) (Oral)  Ht 5' 4\" (1.626 m)  Wt 150 lb 6.4 oz (68.2 kg)  LMP 09/13/2018 (Exact Date)  SpO2 96%  BMI 25.82 kg/m2  Body mass index is 25.82 kg/(m^2).  GENERAL: healthy, alert and no distress  RESP: lungs clear to auscultation - no rales, rhonchi or wheezes  CV: regular rate and rhythm, normal S1 S2, no S3 or S4, no murmur, click or rub, no peripheral edema and peripheral pulses strong  ABDOMEN: soft, nontender, no hepatosplenomegaly, no masses and bowel sounds normal   (female): normal female external genitalia, normal urethral meatus, vaginal mucosa, normal cervix/adnexa/uterus without masses or discharge  MS: no gross musculoskeletal defects noted, no edema  SKIN: no suspicious lesions or rashes  PSYCH: mentation appears normal, affect normal/bright    ASSESSMENT/PLAN:   1. Irregular bleeding  We discussed her irregular bleeding, desire for pregnancy. We discussed likelihood of anovulatory bleeding. She does decline labs today. Recommend she start oral contraceptive pill for cycle regulation as they had been normal prior to July. Will take pills x 3 months at least and then can try going off, track cycles and can try to attempt " pregnancy. We discussed her request for pelvic ultrasound and how results would/would not  recommendations. Will order to rule out other pathology of the irregular bleeding. If normal, continue plan as we discussed. Patient is given an opportunity to ask questions and have them answered. Patient to start PNV, recommend smoking cessation.  - US Pelvic Complete with Transvaginal    MAGED Hoyt [facility]  I have reviewed this encounter and agree.  Geo Menjivar MD FACOG

## 2018-10-04 NOTE — MR AVS SNAPSHOT
After Visit Summary   10/4/2018    Maria Del Rosario Barger    MRN: [id number]           Patient Information     Date Of Birth          1995        Visit Information        Provider Department      10/4/2018 9:10 AM Lydia Lucio APRN CNP [facility]        Today's Diagnoses     Irregular bleeding    -  1       Follow-ups after your visit        Your next 10 appointments already scheduled     Oct 06, 2018 11:00 AM CDT   (Arrive by 10:45 AM)   US PELVIC COMPLETE W TRANSVAGINAL with BEUS1   [facility] [place] ([facility])    [address] [facility]  [place] [address]   [phone]           How do I prepare for my exam? (Food and drink instructions) Adults: Drink four 8-ounce glasses of fluid an hour before your exam. If you need to empty your bladder before your exam, try to release only a little urine. Then, drink another glass of fluid.  Children: * Children who are potty trained up to 6 years old should drink at least 2 cups (16 oz) of water/non-carbonated beverage 30 minutes prior to the exam. * Children who are 6-10 years should drink at least 3 cups (24 oz) of water/non-carbonated beverage 45 minutes prior to the exam. * Children who are 10 years or older should drink at least 4 cups (32 oz) of water/non-carbonated beverage 45 minutes prior to the exam.  If your child is very uncomfortable or has an urgent need to pee, please notify a technologist; they will try to find out how much longer the wait may be and provide instructions to help relieve the pressure.  What should I wear: Wear comfortable clothes.  How long does the exam take: Most ultrasounds take 30 to 60 minutes.  What should I bring: Bring a list of your medicines, including vitamins, minerals and over-the-counter drugs. It is safest to leave personal items at home.  Do I need a :  No  is needed.  What do I need to tell my doctor: Tell your doctor about any allergies you may have.   "What should I do after the exam: No restrictions, You may resume normal activities.  What is this test: An ultrasound uses sound waves to make pictures of the body. Sound waves do not cause pain. The only discomfort may be the pressure of the wand against your skin or full bladder.  Who should I call with questions: If you have any questions, please call the Imaging Department where you will have your exam. Directions, parking instructions, and other information is available on our website, [email or web address].              Who to contact     If you have questions or need follow up information about today's clinic visit or your schedule please contact [facility] directly at [phone].  Normal or non-critical lab and imaging results will be communicated to you by MyChart, letter or phone within 4 business days after the clinic has received the results. If you do not hear from us within 7 days, please contact the clinic through MyChart or phone. If you have a critical or abnormal lab result, we will notify you by phone as soon as possible.  Submit refill requests through [employer] or call your pharmacy and they will forward the refill request to us. Please allow 3 business days for your refill to be completed.          Additional Information About Your Visit        Care EveryWhere ID     This is your Care EveryWhere ID. This could be used by other organizations to access your [place] medical records  [id number]        Your Vitals Were     Pulse Temperature Height Last Period Pulse Oximetry BMI (Body Mass Index)    85 98.6  F (37  C) (Oral) 5' 4\" (1.626 m) 09/13/2018 (Exact Date) 96% 25.82 kg/m2       Blood Pressure from Last 3 Encounters:   10/04/18 120/73   09/14/18 117/72   08/21/18 117/72    Weight from Last 3 Encounters:   10/04/18 150 lb 6.4 oz (68.2 kg)   09/14/18 143 lb 3.2 oz (65 kg)   08/21/18 144 lb 6.4 oz (65.5 kg)              We Performed the Following     US Pelvic Complete with " Transvaginal        Primary Care Provider Office Phone # Fax #    [facility] [phone] [address] [facility] [address]        Equal Access to Services     RM ALFORD : Hadii cesario mesa haddeniso Sovelmaali, waaxda luqadaha, qaybta kaalmada adegabrielleda, faith carpenter laAdonislaureen hamm. So [facility] [phone].    ATENCIÓN: Si habla español, tiene a coleman disposición servicios gratuitos de asistencia lingüística. Llame al [phone].    We comply with applicable federal civil rights laws and [place] laws. We do not discriminate on the basis of race, color, national origin, age, disability, sex, sexual orientation, or gender identity.            Thank you!     Thank you for choosing [facility]  for your care. Our goal is always to provide you with excellent care. Hearing back from our patients is one way we can continue to improve our services. Please take a few minutes to complete the written survey that you may receive in the mail after your visit with us. Thank you!             Your Updated Medication List - Protect others around you: Learn how to safely use, store and throw away your medicines at www.disposemymeds.org.          This list is accurate as of 10/4/18 10:35 AM.  Always use your most recent med list.                   Brand Name Dispense Instructions for use Diagnosis    levonorgestrel-ethinyl estradiol 0.15-30 MG-MCG per tablet    STANLEY    84 tablet    Take 1 tablet by mouth daily    Encounter for initial prescription of contraceptive pills

## 2018-10-06 ENCOUNTER — RADIANT APPOINTMENT (OUTPATIENT)
Dept: ULTRASOUND IMAGING | Facility: CLINIC | Age: 23
End: 2018-10-06
Attending: NURSE PRACTITIONER
Payer: COMMERCIAL

## 2018-10-06 PROCEDURE — 76856 US EXAM PELVIC COMPLETE: CPT

## 2018-10-06 PROCEDURE — 76830 TRANSVAGINAL US NON-OB: CPT

## 2018-12-05 ENCOUNTER — MYC MEDICAL ADVICE (OUTPATIENT)
Dept: FAMILY MEDICINE | Facility: CLINIC | Age: 23
End: 2018-12-05

## 2018-12-05 ENCOUNTER — OFFICE VISIT (OUTPATIENT)
Dept: FAMILY MEDICINE | Facility: CLINIC | Age: 23
End: 2018-12-05
Payer: COMMERCIAL

## 2018-12-05 VITALS
WEIGHT: 152.8 LBS | TEMPERATURE: 98.1 F | DIASTOLIC BLOOD PRESSURE: 83 MMHG | HEART RATE: 77 BPM | BODY MASS INDEX: 26.09 KG/M2 | SYSTOLIC BLOOD PRESSURE: 133 MMHG | HEIGHT: 64 IN | OXYGEN SATURATION: 99 %

## 2018-12-05 DIAGNOSIS — Z83.2 FAMILY HISTORY OF VON WILLEBRAND DISEASE: ICD-10-CM

## 2018-12-05 DIAGNOSIS — Z28.21 INFLUENZA VACCINATION DECLINED BY PATIENT: ICD-10-CM

## 2018-12-05 DIAGNOSIS — N92.0 SPOTTING: Primary | ICD-10-CM

## 2018-12-05 DIAGNOSIS — Z83.2 FAMILY HISTORY OF FACTOR V DEFICIENCY: ICD-10-CM

## 2018-12-05 DIAGNOSIS — Z11.3 SCREEN FOR STD (SEXUALLY TRANSMITTED DISEASE): ICD-10-CM

## 2018-12-05 LAB
ALBUMIN UR-MCNC: NEGATIVE MG/DL
APPEARANCE UR: CLEAR
APTT PPP: 35 SEC (ref 22–37)
BACTERIA #/AREA URNS HPF: ABNORMAL /HPF
BETA HCG QUAL IFA URINE: NEGATIVE
BILIRUB UR QL STRIP: NEGATIVE
COLOR UR AUTO: YELLOW
ERYTHROCYTE [DISTWIDTH] IN BLOOD BY AUTOMATED COUNT: 12.2 % (ref 10–15)
GLUCOSE UR STRIP-MCNC: NEGATIVE MG/DL
HCT VFR BLD AUTO: 39.7 % (ref 35–47)
HGB BLD-MCNC: 13.7 G/DL (ref 11.7–15.7)
HGB UR QL STRIP: ABNORMAL
INR PPP: 0.96 (ref 0.86–1.14)
KETONES UR STRIP-MCNC: NEGATIVE MG/DL
LEUKOCYTE ESTERASE UR QL STRIP: NEGATIVE
MCH RBC QN AUTO: 30.2 PG (ref 26.5–33)
MCHC RBC AUTO-ENTMCNC: 34.5 G/DL (ref 31.5–36.5)
MCV RBC AUTO: 88 FL (ref 78–100)
NITRATE UR QL: NEGATIVE
NON-SQ EPI CELLS #/AREA URNS LPF: ABNORMAL /LPF
PH UR STRIP: 6 PH (ref 5–7)
PLATELET # BLD AUTO: 222 10E9/L (ref 150–450)
RBC # BLD AUTO: 4.53 10E12/L (ref 3.8–5.2)
RBC #/AREA URNS AUTO: ABNORMAL /HPF
SOURCE: ABNORMAL
SP GR UR STRIP: 1.01 (ref 1–1.03)
SPECIMEN SOURCE: NORMAL
UROBILINOGEN UR STRIP-ACNC: 0.2 EU/DL (ref 0.2–1)
WBC # BLD AUTO: 5.3 10E9/L (ref 4–11)
WBC #/AREA URNS AUTO: ABNORMAL /HPF
WET PREP SPEC: NORMAL

## 2018-12-05 PROCEDURE — 85027 COMPLETE CBC AUTOMATED: CPT | Performed by: NURSE PRACTITIONER

## 2018-12-05 PROCEDURE — 81001 URINALYSIS AUTO W/SCOPE: CPT | Performed by: NURSE PRACTITIONER

## 2018-12-05 PROCEDURE — 36415 COLL VENOUS BLD VENIPUNCTURE: CPT | Performed by: NURSE PRACTITIONER

## 2018-12-05 PROCEDURE — 86780 TREPONEMA PALLIDUM: CPT | Performed by: NURSE PRACTITIONER

## 2018-12-05 PROCEDURE — 87210 SMEAR WET MOUNT SALINE/INK: CPT | Performed by: NURSE PRACTITIONER

## 2018-12-05 PROCEDURE — 86803 HEPATITIS C AB TEST: CPT | Performed by: NURSE PRACTITIONER

## 2018-12-05 PROCEDURE — 84703 CHORIONIC GONADOTROPIN ASSAY: CPT | Performed by: NURSE PRACTITIONER

## 2018-12-05 PROCEDURE — 87389 HIV-1 AG W/HIV-1&-2 AB AG IA: CPT | Performed by: NURSE PRACTITIONER

## 2018-12-05 PROCEDURE — 87340 HEPATITIS B SURFACE AG IA: CPT | Performed by: NURSE PRACTITIONER

## 2018-12-05 PROCEDURE — 99214 OFFICE O/P EST MOD 30 MIN: CPT | Performed by: NURSE PRACTITIONER

## 2018-12-05 PROCEDURE — 85730 THROMBOPLASTIN TIME PARTIAL: CPT | Performed by: NURSE PRACTITIONER

## 2018-12-05 PROCEDURE — 85610 PROTHROMBIN TIME: CPT | Performed by: NURSE PRACTITIONER

## 2018-12-05 NOTE — PROGRESS NOTES
SUBJECTIVE:   Maria Del Rosario Barger is a 23 year old female who presents to clinic today for the following health issues:    Vaginal Bleeding (Dysmenorrhea)      Onset: since August    Description:  Duration of bleeding episodes: 14 days  Frequency between periods:  6 days  Describe bleeding/flow:   Clots: no   Number of pads/hour: light just when wiping  Cramping: mild    Intensity:  mild    Accompanying signs and symptoms: nausea, headaches and bloating    History (similar episodes/previous evaluation): None    Precipitating or alleviating factors: None    Therapies tried and outcome: None  Patient requests STD testing today due to concern for spotting after intercourse. Not taking any ASA products and has not missed any OCP's. No STD history.      Problem list and histories reviewed & adjusted, as indicated.  Additional history: as documented    Patient Active Problem List   Diagnosis     Menometrorrhagia     Adjustment disorder with anxious mood     Insomnia, unspecified insomnia     KEISHA (generalized anxiety disorder)     Family history of von Willebrand disease     Family history of factor V deficiency     Low vitamin D level     Past Surgical History:   Procedure Laterality Date     CHOLECYSTOSTOMY  07/2016     COMBINED ESOPHAGOSCOPY, GASTROSCOPY, DUODENOSCOPY (EGD) WITH CO2 INSUFFLATION Bilateral 7/25/2016    Procedure: COMBINED ESOPHAGOSCOPY, GASTROSCOPY, DUODENOSCOPY (EGD) WITH CO2 INSUFFLATION;  Surgeon: Warren Fernandez MD;  Location: [facility] OR     ESOPHAGOSCOPY, GASTROSCOPY, DUODENOSCOPY (EGD), COMBINED N/A 7/25/2016    Procedure: COMBINED ESOPHAGOSCOPY, GASTROSCOPY, DUODENOSCOPY (EGD), BIOPSY SINGLE OR MULTIPLE;  Surgeon: Warren Fernandez MD;  Location: [facility] OR     TONSILLECTOMY       trigger thumb         Social History     Tobacco Use     Smoking status: Current Every Day Smoker     Packs/day: 0.50     Types: Cigarettes     Smokeless tobacco: Never Used   Substance Use Topics     Alcohol use: Yes  "    Comment: occasional      History reviewed. No pertinent family history.      Current Outpatient Medications   Medication Sig Dispense Refill     levonorgestrel-ethinyl estradiol (NORDETTE) 0.15-30 MG-MCG per tablet Take 1 tablet by mouth daily (Patient not taking: Reported on 12/5/2018) 84 tablet 3     BP Readings from Last 3 Encounters:   12/05/18 133/83   10/04/18 120/73   09/14/18 117/72    Wt Readings from Last 3 Encounters:   12/05/18 69.3 kg (152 lb 12.8 oz)   10/04/18 68.2 kg (150 lb 6.4 oz)   09/14/18 65 kg (143 lb 3.2 oz)                    Reviewed and updated as needed this visit by clinical staff  Tobacco  Allergies  Meds  Med Hx  Surg Hx  Fam Hx  Soc Hx      Reviewed and updated as needed this visit by Provider         ROS:  Constitutional, HEENT, cardiovascular, pulmonary, gi and gu systems are negative, except as otherwise noted.    OBJECTIVE:     /83   Pulse 77   Temp 98.1  F (36.7  C) (Oral)   Ht 1.626 m (5' 4\")   Wt 69.3 kg (152 lb 12.8 oz)   LMP 11/29/2018   SpO2 99%   BMI 26.23 kg/m    Body mass index is 26.23 kg/m .  GENERAL: healthy, alert and no distress  EYES: Eyes grossly normal to inspection, PERRL and conjunctivae and sclerae normal  HENT: ear canals and TM's normal, nose and mouth without ulcers or lesions  NECK: no adenopathy, no asymmetry, masses, or scars and thyroid normal to palpation  RESP: lungs clear to auscultation - no rales, rhonchi or wheezes  CV: regular rate and rhythm, normal S1 S2, no S3 or S4, no murmur, click or rub, no peripheral edema and peripheral pulses strong  ABDOMEN: soft, nontender, no hepatosplenomegaly, no masses and bowel sounds normal   (female): deferred  MS: no gross musculoskeletal defects noted, no edema  SKIN: no suspicious lesions or rashes  NEURO: Normal strength and tone, mentation intact and speech normal  BACK: no CVA tenderness, no paralumbar tenderness  PSYCH: mentation appears normal, affect normal/bright    Diagnostic " Test Results:  Results for orders placed or performed in visit on 12/05/18   Beta HCG Qual, Urine - [facility] and [place] ([id number])   Result Value Ref Range    Beta HCG Qual IFA Urine Negative NEG^Negative      UA with Microscopic reflex to Culture   Result Value Ref Range    Color Urine Yellow     Appearance Urine Clear     Glucose Urine Negative NEG^Negative mg/dL    Bilirubin Urine Negative NEG^Negative    Ketones Urine Negative NEG^Negative mg/dL    Specific Gravity Urine 1.010 1.003 - 1.035    pH Urine 6.0 5.0 - 7.0 pH    Protein Albumin Urine Negative NEG^Negative mg/dL    Urobilinogen Urine 0.2 0.2 - 1.0 EU/dL    Nitrite Urine Negative NEG^Negative    Blood Urine Small (A) NEG^Negative    Leukocyte Esterase Urine Negative NEG^Negative    Source Midstream Urine     WBC Urine 0 - 5 OTO5^0 - 5 /HPF    RBC Urine O - 2 OTO2^O - 2 /HPF    Squamous Epithelial /LPF Urine Few FEW^Few /LPF    Bacteria Urine Few (A) NEG^Negative /HPF   HIV Antigen Antibody Combo   Result Value Ref Range    HIV Antigen Antibody Combo Nonreactive NR^Nonreactive       Hepatitis B surface antigen   Result Value Ref Range    Hep B Surface Agn Nonreactive NR^Nonreactive   Treponema Abs w Reflex to RPR and Titer   Result Value Ref Range    Treponema Antibodies Nonreactive NR^Nonreactive   Hepatitis C antibody   Result Value Ref Range    Hepatitis C Antibody Nonreactive NR^Nonreactive   CBC with platelets   Result Value Ref Range    WBC 5.3 4.0 - 11.0 10e9/L    RBC Count 4.53 3.8 - 5.2 10e12/L    Hemoglobin 13.7 11.7 - 15.7 g/dL    Hematocrit 39.7 35.0 - 47.0 %    MCV 88 78 - 100 fl    MCH 30.2 26.5 - 33.0 pg    MCHC 34.5 31.5 - 36.5 g/dL    RDW 12.2 10.0 - 15.0 %    Platelet Count 222 150 - 450 10e9/L   INR   Result Value Ref Range    INR 0.96 0.86 - 1.14   Partial thromboplastin time   Result Value Ref Range    PTT 35 22 - 37 sec   Wet prep   Result Value Ref Range    Specimen Description Vagina     Wet Prep No yeast seen     Wet Prep No clue  "cells seen     Wet Prep No Trichomonas seen    Chlamydia trachomatis PCR   Result Value Ref Range    Specimen Description Vagina     Chlamydia Trachomatis PCR Canceled, Test credited (A) NEG^Negative   Neisseria gonorrhoeae PCR   Result Value Ref Range    Specimen Descrip Vagina     N Gonorrhea PCR Canceled, Test credited (A) NEG^Negative       ASSESSMENT/PLAN:       BP Screening:   Last 3 BP Readings:    BP Readings from Last 3 Encounters:   12/05/18 133/83   10/04/18 120/73   09/14/18 117/72       The following was recommended to the patient:  Re-screen BP within a year and recommended lifestyle modifications  BMI:   Estimated body mass index is 26.23 kg/m  as calculated from the following:    Height as of this encounter: 1.626 m (5' 4\").    Weight as of this encounter: 69.3 kg (152 lb 12.8 oz).   Weight management plan: Discussed healthy diet and exercise guidelines      1. Spotting  Patient has not missed any pills, checking labs.  Spotting is light.  - Beta HCG Qual, Urine - [facility] and [place] ([id number])  - Wet prep  - Chlamydia trachomatis PCR  - Neisseria gonorrhoeae PCR    2. Screen for STD (sexually transmitted disease)  Advised condom use to help prevent STI's.  - Beta HCG Qual, Urine - [facility] and [place] ([id number])  - UA with Microscopic reflex to Culture  - Wet prep  - Chlamydia trachomatis PCR  - Neisseria gonorrhoeae PCR  - HIV Antigen Antibody Combo  - Hepatitis B surface antigen  - Treponema Abs w Reflex to RPR and Titer  - Hepatitis C antibody  - NEISSERIA GONORRHOEA PCR; Future  - CHLAMYDIA TRACHOMATIS PCR; Future    3. Family history of von Willebrand disease  Patient has not had any issues with prior pregnancies so doubt issue but pt is requesting testing.  - CBC with platelets  - INR  - Partial thromboplastin time    4. Family history of factor V deficiency      5. Influenza vaccination declined by patient  Conscientious objector.      See Patient Instructions    Tosha Sharma, MAGED " CNP  [facility]

## 2018-12-05 NOTE — MR AVS SNAPSHOT
After Visit Summary   12/5/2018    Maria Del Rosario Barger    MRN: [id number]           Patient Information     Date Of Birth          1995        Visit Information        Provider Department      12/5/2018 10:40 AM Tosha Sharma APRN CNP [facility]        Today's Diagnoses     Spotting    -  1    Screen for STD (sexually transmitted disease)        Family history of von Willebrand disease        Family history of factor V deficiency          Care Instructions    At [facility], we strive to deliver an exceptional experience to you, every time we see you.  If you receive a survey in the mail, please send us back your thoughts. We really do value your feedback.    Your care team:                            Family Medicine Internal Medicine   MD Deion Kelsey MD Shantel Branch-Fleming, MD Katya Georgiev PA-C Megan Hill, APRN CNP Nam Ho, MD Pediatrics   MENDEZ Waldron CNP Paula Brito, MD Amelia Massimini APRN CNP Shaista Malik, MD Bethany Templen, MD Deborah Mielke, MD Kim Thein, APRN CNP      Clinic hours: Monday - Thursday 7 am-7 pm; Fridays 7 am-5 pm.   Urgent care: Monday - Friday 11 am-9 pm; Saturday and Sunday 9 am-5 pm.  Pharmacy : Monday -Thursday 8 am-8 pm; Friday 8 am-6 pm; Saturday and Sunday 9 am-5 pm.     Clinic: [phone]   Pharmacy: [phone]        Understanding Uterine Bleeding  Your uterine bleeding may be heavy. Or you may have bleeding between periods. These problems may be caused by hormonal imbalance. Or they can be caused by uterine growths, an intrauterine device (IUD), bleeding disorder, or pregnancy.  Hormonal imbalance  Your menstrual cycle is controlled by hormones. The hormones include estrogen and progesterone. Sometimes there is too much or too little of one or both of these hormones, causing an imbalance. This can cause heavy periods. Or it can cause bleeding between  periods. Causes of hormonal imbalance can include:    Hormonal changes in teens and in women nearing menopause    Diabetes, thyroid disease, or other medical problems    Obesity    Stress    Strenuous exercise    Anorexia, an eating disorder    Pregnancy  Uterine growths  There are different kinds of uterine growths. These include:    Fibroids. These are round knots of noncancer (benign) muscle tissue in the uterus.    Polyps. These are soft tissue growths in the uterine lining. They often extend into the uterus.    Adenomyosis. This is when the uterine lining grows into the muscle wall.    Hyperplasia. This is when the uterine lining gets too thick or grows too much.    Endometrial cancer. This is uncontrolled growth of part of the uterine lining.  Other causes of uterine bleeding  There are other causes of uterine bleeding. These include:    IUD (intrauterine device). This is a method of birth control. Some IUDs contain hormones.    Bleeding disorders. This is when the blood can't clot properly.  Treatment  Your healthcare provider can help diagnose the cause of your bleeding problem. He or she will work with you to plan treatment as needed.  Date Last Reviewed: 6/1/2017 2000-2018 [employer]. [address] [place] [address]. All rights reserved. This information is not intended as a substitute for professional medical care. Always follow your healthcare professional's instructions.                Follow-ups after your visit        Who to contact     If you have questions or need follow up information about today's clinic visit or your schedule please contact [facility] directly at [phone].  Normal or non-critical lab and imaging results will be communicated to you by MyChart, letter or phone within 4 business days after the clinic has received the results. If you do not hear from us within 7 days, please contact the clinic through MyChart or phone. If you have a  "critical or abnormal lab result, we will notify you by phone as soon as possible.  Submit refill requests through [employer] or call your pharmacy and they will forward the refill request to us. Please allow 3 business days for your refill to be completed.          Additional Information About Your Visit        [employer] Information     [employer] lets you send messages to your doctor, view your test results, renew your prescriptions, schedule appointments and more. To sign up, go to [email or web address] . Click on \"Log in\" on the left side of the screen, which will take you to the Welcome page. Then click on \"Sign up Now\" on the right side of the page.     You will be asked to enter the access code listed below, as well as some personal information. Please follow the directions to create your username and password.     Your access code is: [email or web address]  Expires: 3/5/2019 11:24 AM     Your access code will  in 90 days. If you need help or a new code, please call your [facility] clinic or [phone].        Care EveryWhere ID     This is your Care EveryWhere ID. This could be used by other organizations to access your [facility] medical records  [id number]        Your Vitals Were     Pulse Temperature Height Last Period Pulse Oximetry BMI (Body Mass Index)    77 98.1  F (36.7  C) (Oral) 5' 4\" (1.626 m) 2018 99% 26.23 kg/m2       Blood Pressure from Last 3 Encounters:   18 133/83   10/04/18 120/73   18 117/72    Weight from Last 3 Encounters:   18 152 lb 12.8 oz (69.3 kg)   10/04/18 150 lb 6.4 oz (68.2 kg)   18 143 lb 3.2 oz (65 kg)              We Performed the Following     Beta HCG Qual, Urine - [facility] and [place] ([id number])     CBC with platelets     Chlamydia trachomatis PCR     Hepatitis B surface antigen     Hepatitis C antibody     HIV Antigen Antibody Combo     INR     Neisseria gonorrhoeae PCR     Partial thromboplastin time     Treponema Abs w Reflex to RPR and Titer     UA " with Microscopic reflex to Culture     Wet prep        Primary Care Provider Office Phone # Fax #    [facility] [phone] [address] [address] [facility] [address]        Equal Access to Services     RM HAMM: Hadii aad ku haddeniso Sovelmaali, waaxda luqadaha, qaybta kaalmada adeegyada, faith mcneilldavid corteskia wagonervicki hamm. So [facility] [phone].    ATENCIÓN: Si habla español, tiene a coleman disposición servicios gratuitos de asistencia lingüística. Llame al [phone].    We comply with applicable federal civil rights laws and [place] laws. We do not discriminate on the basis of race, color, national origin, age, disability, sex, sexual orientation, or gender identity.            Thank you!     Thank you for choosing [facility]  for your care. Our goal is always to provide you with excellent care. Hearing back from our patients is one way we can continue to improve our services. Please take a few minutes to complete the written survey that you may receive in the mail after your visit with us. Thank you!             Your Updated Medication List - Protect others around you: Learn how to safely use, store and throw away your medicines at www.disposemymeds.org.          This list is accurate as of 12/5/18 11:24 AM.  Always use your most recent med list.                   Brand Name Dispense Instructions for use Diagnosis    levonorgestrel-ethinyl estradiol 0.15-30 MG-MCG tablet    STANLEY    84 tablet    Take 1 tablet by mouth daily    Encounter for initial prescription of contraceptive pills

## 2018-12-05 NOTE — PATIENT INSTRUCTIONS
At [facility], we strive to deliver an exceptional experience to you, every time we see you.  If you receive a survey in the mail, please send us back your thoughts. We really do value your feedback.    Your care team:                            Family Medicine Internal Medicine   MD Deion Kelsey MD Shantel Branch-Fleming, MD Katya Georgiev PA-C Megan Hill, APRN CNP    Beni Gregg MD Pediatrics   Neal Kiran, MENDEZ Huynh, MD Bharati Berumen APRN CNP   MD Suzanne Beach MD Deborah Mielke, MD Amy Sharma, APRN [facility]      Clinic hours: Monday - Thursday 7 am-7 pm; Fridays 7 am-5 pm.   Urgent care: Monday - Friday 11 am-9 pm; Saturday and Sunday 9 am-5 pm.  Pharmacy : Monday -Thursday 8 am-8 pm; Friday 8 am-6 pm; Saturday and Sunday 9 am-5 pm.     Clinic: [phone]   Pharmacy: [phone]        Understanding Uterine Bleeding  Your uterine bleeding may be heavy. Or you may have bleeding between periods. These problems may be caused by hormonal imbalance. Or they can be caused by uterine growths, an intrauterine device (IUD), bleeding disorder, or pregnancy.  Hormonal imbalance  Your menstrual cycle is controlled by hormones. The hormones include estrogen and progesterone. Sometimes there is too much or too little of one or both of these hormones, causing an imbalance. This can cause heavy periods. Or it can cause bleeding between periods. Causes of hormonal imbalance can include:    Hormonal changes in teens and in women nearing menopause    Diabetes, thyroid disease, or other medical problems    Obesity    Stress    Strenuous exercise    Anorexia, an eating disorder    Pregnancy  Uterine growths  There are different kinds of uterine growths. These include:    Fibroids. These are round knots of noncancer (benign) muscle tissue in the uterus.    Polyps. These are soft tissue growths in the uterine lining. They often extend into  the uterus.    Adenomyosis. This is when the uterine lining grows into the muscle wall.    Hyperplasia. This is when the uterine lining gets too thick or grows too much.    Endometrial cancer. This is uncontrolled growth of part of the uterine lining.  Other causes of uterine bleeding  There are other causes of uterine bleeding. These include:    IUD (intrauterine device). This is a method of birth control. Some IUDs contain hormones.    Bleeding disorders. This is when the blood can't clot properly.  Treatment  Your healthcare provider can help diagnose the cause of your bleeding problem. He or she will work with you to plan treatment as needed.  Date Last Reviewed: 6/1/2017 2000-2018 [employer]. [address] [place] [address], [place] [address]. All rights reserved. This information is not intended as a substitute for professional medical care. Always follow your healthcare professional's instructions.

## 2018-12-06 LAB
C TRACH DNA SPEC QL NAA+PROBE: ABNORMAL
HBV SURFACE AG SERPL QL IA: NONREACTIVE
HCV AB SERPL QL IA: NONREACTIVE
HIV 1+2 AB+HIV1 P24 AG SERPL QL IA: NONREACTIVE
N GONORRHOEA DNA SPEC QL NAA+PROBE: ABNORMAL
SPECIMEN SOURCE: ABNORMAL
SPECIMEN SOURCE: ABNORMAL
T PALLIDUM AB SER QL: NONREACTIVE

## 2018-12-27 DIAGNOSIS — Z11.3 SCREEN FOR STD (SEXUALLY TRANSMITTED DISEASE): ICD-10-CM

## 2018-12-27 PROCEDURE — 87491 CHLMYD TRACH DNA AMP PROBE: CPT | Performed by: NURSE PRACTITIONER

## 2018-12-27 PROCEDURE — 87591 N.GONORRHOEAE DNA AMP PROB: CPT | Performed by: NURSE PRACTITIONER

## 2018-12-29 ENCOUNTER — MYC MEDICAL ADVICE (OUTPATIENT)
Dept: FAMILY MEDICINE | Facility: CLINIC | Age: 23
End: 2018-12-29

## 2019-01-15 ENCOUNTER — MYC MEDICAL ADVICE (OUTPATIENT)
Dept: FAMILY MEDICINE | Facility: CLINIC | Age: 24
End: 2019-01-15

## 2019-01-16 NOTE — TELEPHONE ENCOUNTER
E-visit instructions given to Maria Del Rosario to further discuss vaginal discomfort after intercourse.     Nithin Yu RN, BSN

## 2019-02-27 ENCOUNTER — OFFICE VISIT (OUTPATIENT)
Dept: OBGYN | Facility: CLINIC | Age: 24
End: 2019-02-27
Payer: COMMERCIAL

## 2019-02-27 VITALS
HEART RATE: 84 BPM | BODY MASS INDEX: 27.12 KG/M2 | TEMPERATURE: 98.4 F | SYSTOLIC BLOOD PRESSURE: 119 MMHG | DIASTOLIC BLOOD PRESSURE: 74 MMHG | WEIGHT: 158 LBS

## 2019-02-27 DIAGNOSIS — Z32.01 POSITIVE PREGNANCY TEST: Primary | ICD-10-CM

## 2019-02-27 LAB — HCG UR QL: POSITIVE

## 2019-02-27 PROCEDURE — 99213 OFFICE O/P EST LOW 20 MIN: CPT | Performed by: OBSTETRICS & GYNECOLOGY

## 2019-02-27 PROCEDURE — 81025 URINE PREGNANCY TEST: CPT | Performed by: OBSTETRICS & GYNECOLOGY

## 2019-02-27 NOTE — PATIENT INSTRUCTIONS
If you have any questions regarding your visit, Please contact your care team.     [employer] [employer]: [phone]    [facility] CLINIC HOURS TELEPHONE NUMBER       YUMIKO Figueredo-    Nehemias Talbert-Medical Assistant       Monday-[place]  8:00a.m-4:45 p.m  Tuesday-[place]  9:00a.m-11:45 a.m  Wednesday-[place] 8:00a.m-4:45 p.m.  Thursday-[place]  8:00a.m-4:45 p.m.  Friday-[place]  8:00a.m-4:45 p.m. [facility]  [address] [place] [address]  [phone] ask [facility]  [phone] Fax  Imaging [phone]    [facility]  [address] [place] [address] [place] [address]  [phone]    [facility]  [address]  [place] [address]  [phone] ask [facility]  [phone] Fax  Imaging [phone]     Urgent Care locations:    [facility] Monday-Friday  5 pm - 9 pm  Saturday and Sunday   9 am - 5 pm    Monday-Friday   11 am - 9 pm  Saturday and Sunday   9 am - 5 pm   [phone]       If you need a medication refill, please contact your pharmacy. Please allow 3 business days for your refill to be completed.  As always, Thank you for trusting us with your healthcare needs!

## 2019-02-28 NOTE — PROGRESS NOTES
OB-GYN Problem-Oriented Visit or Consultation      Maria Del Rosario Barger is a 23 year old year old P 1 who presents with a chief complaint of pregnancy confirmation.  Referred by self.  Patient's last menstrual period was 01/25/2019 (approximate).    HPI:     History of menometrorrhagia that resolved but irregular menses. Planned pregnancy. Normal pregnancy symptoms. FHx of bleeding and clotting disorders but not for patient. Has quit smoking today.     Past medical, obstetrical, surgical, family and social history reviewed and as noted or updated in chart.     Allergies, meds and supplements are as noted or updated in chart.      ROS:   Systems reviewed include:  constitutional, breast, cardiac, respiratory, gastrointestinal, genitourinary, psychological, hematologic/lymphatic and endocrine.    These systems were negative for significant symptoms except for the following additional: none; see HPI.    EXAM:  VS as noted. /74 (BP Location: Left arm, Patient Position: Chair, Cuff Size: Adult Regular)   Pulse 84   Temp 98.4  F (36.9  C) (Oral)   Wt 71.7 kg (158 lb)   LMP 01/25/2019 (Approximate)   Breastfeeding? No   BMI 27.12 kg/m    Constitutionally normal.     Exam not repeated at this time.      Assessment:   Encounter Diagnoses   Name Primary?     Positive pregnancy test Yes       I reviewed the condition, causes, differential diagnosis, prognosis, evaluation and management considerations and options.  Questions answered and information given. See orders.         Plan and Recommendations: Basic precautions. PNVits. Check ultrasound in 2-3 weeks. RTC first OB. MOLLY 11/1.   Total encounter time (physician together with patient) = 20min. Direct counseling, education and care coordination time (physician together with patient) = 10min.     A/P:  Maria Del Rosario was seen today for confirmation of pregnancy.    Diagnoses and all orders for this visit:    Positive pregnancy test  -     HCG Qual, Urine ([id number])  -     US OB <  14 Weeks Single; Future        Eliel Rider MD

## 2019-03-13 ENCOUNTER — ANCILLARY PROCEDURE (OUTPATIENT)
Dept: ULTRASOUND IMAGING | Facility: CLINIC | Age: 24
End: 2019-03-13
Attending: OBSTETRICS & GYNECOLOGY
Payer: COMMERCIAL

## 2019-03-13 DIAGNOSIS — Z32.01 POSITIVE PREGNANCY TEST: ICD-10-CM

## 2019-03-13 PROCEDURE — 76801 OB US < 14 WKS SINGLE FETUS: CPT

## 2019-03-24 ENCOUNTER — OFFICE VISIT (OUTPATIENT)
Dept: URGENT CARE | Facility: URGENT CARE | Age: 24
End: 2019-03-24
Payer: COMMERCIAL

## 2019-03-24 VITALS
SYSTOLIC BLOOD PRESSURE: 120 MMHG | RESPIRATION RATE: 99 BRPM | BODY MASS INDEX: 27.64 KG/M2 | HEART RATE: 94 BPM | DIASTOLIC BLOOD PRESSURE: 84 MMHG | WEIGHT: 161 LBS | TEMPERATURE: 98.6 F

## 2019-03-24 DIAGNOSIS — Z3A.08 8 WEEKS GESTATION OF PREGNANCY: ICD-10-CM

## 2019-03-24 DIAGNOSIS — K05.20 ACUTE PERIODONTITIS: ICD-10-CM

## 2019-03-24 DIAGNOSIS — R22.0 FACIAL SWELLING: Primary | ICD-10-CM

## 2019-03-24 PROCEDURE — 99213 OFFICE O/P EST LOW 20 MIN: CPT | Performed by: FAMILY MEDICINE

## 2019-03-24 NOTE — PROGRESS NOTES
Acute Illness   Acute illness concerns: dental pain with right facial swelling  Onset: yesterday    Fever: no    Chills/Sweats: no    Headache (location?): YES    Sinus Pressure:no    Conjunctivitis:  no    Ear Pain: no    Rhinorrhea: no    Congestion: no    Sore Throat: no     Cough: no    Wheeze: no    Decreased Appetite: no    Nausea: no    Vomiting: no    Diarrhea:  no    Dysuria/Freq.: no    Fatigue/Achiness: no    Sick/Strep Exposure: no     Therapies Tried and outcome: Tylenol       Right lower wisdom tooth pain with gum swelling. In contact with her Dentist- plans to follow up next week.   8 weeks pregnant.         Problem list and histories reviewed & adjusted, as indicated.  Additional history: as documented    Patient Active Problem List   Diagnosis     KEISHA (generalized anxiety disorder)     Family history of von Willebrand disease     Family history of factor V deficiency     Low vitamin D level     Atopic rhinitis     Past Surgical History:   Procedure Laterality Date     COMBINED ESOPHAGOSCOPY, GASTROSCOPY, DUODENOSCOPY (EGD) WITH CO2 INSUFFLATION Bilateral 2016    Procedure: COMBINED ESOPHAGOSCOPY, GASTROSCOPY, DUODENOSCOPY (EGD) WITH CO2 INSUFFLATION;  Surgeon: Warren Fernandez MD;  Location: [facility]     ESOPHAGOSCOPY, GASTROSCOPY, DUODENOSCOPY (EGD), COMBINED N/A 2016    Procedure: COMBINED ESOPHAGOSCOPY, GASTROSCOPY, DUODENOSCOPY (EGD), BIOPSY SINGLE OR MULTIPLE;  Surgeon: Warren Fernandez MD;  Location: [facility] OR     LAPAROSCOPIC CHOLECYSTECTOMY  2016     TONSILLECTOMY       trigger thumb Right        Social History     Tobacco Use     Smoking status: Former Smoker     Packs/day: 0.50     Types: Cigarettes     Last attempt to quit: 2019     Years since quittin.0     Smokeless tobacco: Never Used   Substance Use Topics     Alcohol use: Yes     Comment: occasional      History reviewed. No pertinent family history.      Current Outpatient Medications   Medication  "Sig Dispense Refill     amoxicillin-clavulanate (AUGMENTIN) 875-125 MG tablet Take 1 tablet by mouth 2 times daily for 10 days 20 tablet 0     Allergies   Allergen Reactions     Sulfa Drugs      Tobramycin Swelling     Eyes swelled     Topamax [Topiramate]             ROS:  All others are negative except as above.      OBJECTIVE:     /84 (BP Location: Left arm, Patient Position: Chair, Cuff Size: Adult Regular)   Pulse 94   Temp 98.6  F (37  C) (Oral)   Resp (!) 99   Wt 73 kg (161 lb)   LMP 01/25/2019 (Approximate)   HC 19 cm (7.48\")   BMI 27.64 kg/m    Body mass index is 27.64 kg/m .  GENERAL: healthy, alert and no distress  EYES: Eyes grossly normal to inspection, PERRL and conjunctivae and sclerae normal  HENT: ear canals and TM's normal, nose normal. Gum swelling with tenderness of the 2nd right lower molar tooth with right sided facial swelling but no overlying facial skin changes.   NECK: no adenopathy, no asymmetry, masses, or scars and thyroid normal to palpation  RESP: lungs clear to auscultation - no rales, rhonchi or wheezes  CV: regular rate and rhythm, normal S1 S2, no S3 or S4, no murmur, click or rub, no peripheral edema and peripheral pulses strong      Diagnostic Test Results:  none     ASSESSMENT/PLAN:     Maria Del Rosario was seen today for facial swelling.    Diagnoses and all orders for this visit:    Facial swelling secondary to Acute periodontitis  -     amoxicillin-clavulanate (AUGMENTIN) 875-125 MG tablet; Take 1 tablet by mouth 2 times daily for 10 days  -     Follow up with Dentist as planned next week  Good oral hygiene discussed, ice pack to the area. OTC Tylenol as needed for pain. Avoid NSAIDs due to pregnancy. Patient verbalized understanding.    8 weeks gestation of pregnancy  Continue prenatal care with OBGYN.        Follow up with Dentist within 2 days.      The patient was in agreement with the plan today and had no questions or concerns prior to leaving the " clinic.      Jayashree Marquis MD  [facility]

## 2019-03-25 ENCOUNTER — TELEPHONE (OUTPATIENT)
Dept: OBGYN | Facility: CLINIC | Age: 24
End: 2019-03-25

## 2019-03-25 NOTE — TELEPHONE ENCOUNTER
Patient is calling to speak with provider about removing a wisdom tooth today since she is 8 weeks pregnant they need the ok for this     Please call to advice  Thank you

## 2019-03-26 ENCOUNTER — PRENATAL OFFICE VISIT (OUTPATIENT)
Dept: OBGYN | Facility: CLINIC | Age: 24
End: 2019-03-26
Payer: COMMERCIAL

## 2019-03-26 VITALS
TEMPERATURE: 98.4 F | OXYGEN SATURATION: 99 % | HEIGHT: 64 IN | HEART RATE: 72 BPM | SYSTOLIC BLOOD PRESSURE: 114 MMHG | WEIGHT: 160.4 LBS | DIASTOLIC BLOOD PRESSURE: 60 MMHG | BODY MASS INDEX: 27.39 KG/M2

## 2019-03-26 DIAGNOSIS — Z34.80 SUPERVISION OF OTHER NORMAL PREGNANCY, ANTEPARTUM: ICD-10-CM

## 2019-03-26 LAB
BASOPHILS # BLD AUTO: 0 10E9/L (ref 0–0.2)
BASOPHILS NFR BLD AUTO: 0.1 %
DIFFERENTIAL METHOD BLD: NORMAL
EOSINOPHIL # BLD AUTO: 0.1 10E9/L (ref 0–0.7)
EOSINOPHIL NFR BLD AUTO: 1.4 %
ERYTHROCYTE [DISTWIDTH] IN BLOOD BY AUTOMATED COUNT: 12 % (ref 10–15)
HCT VFR BLD AUTO: 38.2 % (ref 35–47)
HGB BLD-MCNC: 13.1 G/DL (ref 11.7–15.7)
LYMPHOCYTES # BLD AUTO: 1.5 10E9/L (ref 0.8–5.3)
LYMPHOCYTES NFR BLD AUTO: 15.7 %
MCH RBC QN AUTO: 29.7 PG (ref 26.5–33)
MCHC RBC AUTO-ENTMCNC: 34.3 G/DL (ref 31.5–36.5)
MCV RBC AUTO: 87 FL (ref 78–100)
MONOCYTES # BLD AUTO: 0.5 10E9/L (ref 0–1.3)
MONOCYTES NFR BLD AUTO: 5.4 %
NEUTROPHILS # BLD AUTO: 7.3 10E9/L (ref 1.6–8.3)
NEUTROPHILS NFR BLD AUTO: 77.4 %
PLATELET # BLD AUTO: 255 10E9/L (ref 150–450)
RBC # BLD AUTO: 4.41 10E12/L (ref 3.8–5.2)
WBC # BLD AUTO: 9.4 10E9/L (ref 4–11)

## 2019-03-26 PROCEDURE — 86900 BLOOD TYPING SEROLOGIC ABO: CPT | Performed by: NURSE PRACTITIONER

## 2019-03-26 PROCEDURE — 87591 N.GONORRHOEAE DNA AMP PROB: CPT | Performed by: NURSE PRACTITIONER

## 2019-03-26 PROCEDURE — 86901 BLOOD TYPING SEROLOGIC RH(D): CPT | Performed by: NURSE PRACTITIONER

## 2019-03-26 PROCEDURE — 87389 HIV-1 AG W/HIV-1&-2 AB AG IA: CPT | Performed by: NURSE PRACTITIONER

## 2019-03-26 PROCEDURE — 87086 URINE CULTURE/COLONY COUNT: CPT | Performed by: NURSE PRACTITIONER

## 2019-03-26 PROCEDURE — G0145 SCR C/V CYTO,THINLAYER,RESCR: HCPCS | Performed by: NURSE PRACTITIONER

## 2019-03-26 PROCEDURE — 87491 CHLMYD TRACH DNA AMP PROBE: CPT | Performed by: NURSE PRACTITIONER

## 2019-03-26 PROCEDURE — 0064U ANTB TP TOTAL&RPR IA QUAL: CPT | Performed by: NURSE PRACTITIONER

## 2019-03-26 PROCEDURE — 87340 HEPATITIS B SURFACE AG IA: CPT | Performed by: NURSE PRACTITIONER

## 2019-03-26 PROCEDURE — 86762 RUBELLA ANTIBODY: CPT | Performed by: NURSE PRACTITIONER

## 2019-03-26 PROCEDURE — 85025 COMPLETE CBC W/AUTO DIFF WBC: CPT | Performed by: NURSE PRACTITIONER

## 2019-03-26 PROCEDURE — 86850 RBC ANTIBODY SCREEN: CPT | Performed by: NURSE PRACTITIONER

## 2019-03-26 PROCEDURE — 99207 ZZC FIRST OB VISIT: CPT | Performed by: NURSE PRACTITIONER

## 2019-03-26 PROCEDURE — 36415 COLL VENOUS BLD VENIPUNCTURE: CPT | Performed by: NURSE PRACTITIONER

## 2019-03-26 SDOH — HEALTH STABILITY: MENTAL HEALTH: HOW OFTEN DO YOU HAVE A DRINK CONTAINING ALCOHOL?: NEVER

## 2019-03-26 ASSESSMENT — MIFFLIN-ST. JEOR: SCORE: 1463.6

## 2019-03-26 ASSESSMENT — PAIN SCALES - GENERAL: PAINLEVEL: NO PAIN (0)

## 2019-03-26 NOTE — TELEPHONE ENCOUNTER
Left message for RTN call. Will also send mychart message to patient    Colleen Collazo  Women's Health

## 2019-03-26 NOTE — PROGRESS NOTES
Maria Del Rosario is a 23 year old  @ 8 weeks here for new OB visit.  Patient had acute periodontitis. Had a wisdom tooth pulled yesterday-done under a local anesthetic. No general anesthesia. Minimal discomfort today.  Had called to ask if procedure was ok, but did not state she had an infection. Initial recommendation was to wait until after pregnancy if it was not needed immediately. However, as it was actually due to an infection and patient was symptomatic, it was completed.    See OB questionnaire for pertinent components of HPI.    OBhx:  x in   SAB x 1 in   Gyne: Pap smears Normal  history of STD: Chlamydia  Past Medical History:   Diagnosis Date     Atopic rhinitis 2008    Overview:  by history to dogs and cats     Chlamydia      KEISHA (generalized anxiety disorder) 2016     Past Surgical History:   Procedure Laterality Date     COMBINED ESOPHAGOSCOPY, GASTROSCOPY, DUODENOSCOPY (EGD) WITH CO2 INSUFFLATION Bilateral 2016    Procedure: COMBINED ESOPHAGOSCOPY, GASTROSCOPY, DUODENOSCOPY (EGD) WITH CO2 INSUFFLATION;  Surgeon: Warren Fernandez MD;  Location:  OR     ESOPHAGOSCOPY, GASTROSCOPY, DUODENOSCOPY (EGD), COMBINED N/A 2016    Procedure: COMBINED ESOPHAGOSCOPY, GASTROSCOPY, DUODENOSCOPY (EGD), BIOPSY SINGLE OR MULTIPLE;  Surgeon: Warren Fernandez MD;  Location:  OR     LAPAROSCOPIC CHOLECYSTECTOMY       TONSILLECTOMY       trigger thumb Right      Patient Active Problem List    Diagnosis Date Noted     Supervision of other normal pregnancy, antepartum 2019     Priority: Medium     Low vitamin D level 2018     Priority: Medium     Family history of von Willebrand disease 2018     Priority: Medium     Family history of factor V deficiency 2018     Priority: Medium     KEISHA (generalized anxiety disorder) 2016     Priority: Medium     Atopic rhinitis 2008     Priority: Medium     Overview:   by history to dogs and cats    "       Allergies   Allergen Reactions     Sulfa Drugs      Tobramycin Swelling     Eyes swelled     Topamax [Topiramate]      Current Outpatient Medications   Medication Sig Dispense Refill     amoxicillin-clavulanate (AUGMENTIN) 875-125 MG tablet Take 1 tablet by mouth 2 times daily for 10 days 20 tablet 0       Review of Systems:     CONSTITUTIONAL:NEGATIVE for fever, chills, change in weight  INTEGUMENTARY/SKIN: NEGATIVE for worrisome rashes, moles or lesions  EYES: NEGATIVE for vision changes or irritation  ENT/MOUTH: NEGATIVE for ear, mouth and throat problems - did have a wisdom tooth removed yesterday, minimal discomfort  RESP:NEGATIVE for significant cough or SOB  BREAST: NEGATIVE for masses, tenderness or discharge  CV: NEGATIVE for chest pain, palpitations or peripheral edema  GI: NEGATIVE for nausea, abdominal pain, heartburn, or change in bowel habits  :  Denies current vaginal bleeding, abnormal vaginal discharge  NEURO: NEGATIVE for weakness, dizziness or paresthesias  HEME/ALLERGY/IMMUNE: NEGATIVE for bleeding problems  PSYCHIATRIC: NEGATIVE for changes in mood or affect      Past Medical History of Father of Baby: No significant medical history  History Since Last Menstrual Period: Acute Periodontitis    Physical Exam: /60 (BP Location: Right arm, Patient Position: Sitting, Cuff Size: Adult Regular)   Pulse 72   Temp 98.4  F (36.9  C) (Oral)   Ht 1.619 m (5' 3.75\")   Wt 72.8 kg (160 lb 6.4 oz)   LMP 01/25/2019 (Approximate)   SpO2 99%   BMI 27.75 kg/m    General: Well developed, well nourished female  Skin: Normal  HEENT: Normal  Neck: Supple,no adenopathy,thyroid normal  Chest: Clear to auscultation  Heart: Regular rate, rhythm,No murmur, rub, gallop  Abdomen: Benign and No masses, organomegaly    Extremities: Normal  Neurological: Normal   Perineum: Intact   Vulva: Normal  Vagina: Normal mucosa, no discharge  Cervix: Parous, closed, mobile, no discharge  Uterus: 8 weeks, Normal " shape, position and consistency   Adnexa: Normal  Bony Pelvis: Adequate     A/P 23 year old  at 8 weeks  Discussed physician coverage, tertiary support, diet, exercise, weight gain, schedule of visits, routine and indicated ultrasounds, and childbirth education.  Prenatal labs: Prenatal Panel, GC, Chlamydia, Urine Culture, Pap smear.  Continue taking prenatal vitamins  Discussed maternal quad screening between 15-20 weeks and reviewed benefits and limitations.  Discussed use of Tylenol and ice for discomfort and swelling after oral procedure.     Lydia LYNNE CNP

## 2019-03-27 LAB
ABO + RH BLD: NORMAL
ABO + RH BLD: NORMAL
BACTERIA SPEC CULT: NORMAL
BLD GP AB SCN SERPL QL: NORMAL
BLOOD BANK CMNT PATIENT-IMP: NORMAL
C TRACH DNA SPEC QL NAA+PROBE: NEGATIVE
HBV SURFACE AG SERPL QL IA: NONREACTIVE
HIV 1+2 AB+HIV1 P24 AG SERPL QL IA: NONREACTIVE
N GONORRHOEA DNA SPEC QL NAA+PROBE: NEGATIVE
SPECIMEN EXP DATE BLD: NORMAL
SPECIMEN SOURCE: NORMAL

## 2019-03-28 ENCOUNTER — MYC MEDICAL ADVICE (OUTPATIENT)
Dept: OBGYN | Facility: CLINIC | Age: 24
End: 2019-03-28

## 2019-03-28 LAB
COPATH REPORT: NORMAL
PAP: NORMAL
RUBV IGG SERPL IA-ACNC: 12 IU/ML
T PALLIDUM AB SER QL: NONREACTIVE

## 2019-04-14 ENCOUNTER — NURSE TRIAGE (OUTPATIENT)
Dept: NURSING | Facility: CLINIC | Age: 24
End: 2019-04-14

## 2019-04-15 ENCOUNTER — PRENATAL OFFICE VISIT (OUTPATIENT)
Dept: OBGYN | Facility: CLINIC | Age: 24
End: 2019-04-15
Payer: COMMERCIAL

## 2019-04-15 VITALS
BODY MASS INDEX: 27.39 KG/M2 | SYSTOLIC BLOOD PRESSURE: 119 MMHG | HEIGHT: 64 IN | WEIGHT: 160.4 LBS | TEMPERATURE: 98.2 F | OXYGEN SATURATION: 97 % | HEART RATE: 108 BPM | DIASTOLIC BLOOD PRESSURE: 70 MMHG

## 2019-04-15 DIAGNOSIS — B96.89 BV (BACTERIAL VAGINOSIS): ICD-10-CM

## 2019-04-15 DIAGNOSIS — R10.31 RLQ ABDOMINAL PAIN: Primary | ICD-10-CM

## 2019-04-15 DIAGNOSIS — N76.0 BV (BACTERIAL VAGINOSIS): ICD-10-CM

## 2019-04-15 LAB
ALBUMIN UR-MCNC: NEGATIVE MG/DL
APPEARANCE UR: CLEAR
BILIRUB UR QL STRIP: NEGATIVE
COLOR UR AUTO: YELLOW
DIFFERENTIAL METHOD BLD: ABNORMAL
ERYTHROCYTE [DISTWIDTH] IN BLOOD BY AUTOMATED COUNT: 12.3 % (ref 10–15)
GLUCOSE UR STRIP-MCNC: NEGATIVE MG/DL
HCT VFR BLD AUTO: 37.9 % (ref 35–47)
HGB BLD-MCNC: 13.3 G/DL (ref 11.7–15.7)
HGB UR QL STRIP: NEGATIVE
KETONES UR STRIP-MCNC: 15 MG/DL
LEUKOCYTE ESTERASE UR QL STRIP: NEGATIVE
LYMPHOCYTES # BLD AUTO: 0.6 10E9/L (ref 0.8–5.3)
LYMPHOCYTES NFR BLD AUTO: 5 %
MCH RBC QN AUTO: 30 PG (ref 26.5–33)
MCHC RBC AUTO-ENTMCNC: 35.1 G/DL (ref 31.5–36.5)
MCV RBC AUTO: 86 FL (ref 78–100)
MONOCYTES # BLD AUTO: 0.6 10E9/L (ref 0–1.3)
MONOCYTES NFR BLD AUTO: 5 %
MUCOUS THREADS #/AREA URNS LPF: PRESENT /LPF
NEUTROPHILS # BLD AUTO: 11.3 10E9/L (ref 1.6–8.3)
NEUTROPHILS NFR BLD AUTO: 90 %
NITRATE UR QL: NEGATIVE
NON-SQ EPI CELLS #/AREA URNS LPF: ABNORMAL /LPF
PH UR STRIP: 6 PH (ref 5–7)
PLATELET # BLD AUTO: 223 10E9/L (ref 150–450)
PLATELET # BLD EST: ABNORMAL 10*3/UL
RBC # BLD AUTO: 4.43 10E12/L (ref 3.8–5.2)
RBC #/AREA URNS AUTO: ABNORMAL /HPF
RBC MORPH BLD: NORMAL
SOURCE: ABNORMAL
SP GR UR STRIP: 1.02 (ref 1–1.03)
SPECIMEN SOURCE: ABNORMAL
UROBILINOGEN UR STRIP-ACNC: 0.2 EU/DL (ref 0.2–1)
WBC # BLD AUTO: 12.5 10E9/L (ref 4–11)
WBC #/AREA URNS AUTO: ABNORMAL /HPF
WET PREP SPEC: ABNORMAL

## 2019-04-15 PROCEDURE — 87210 SMEAR WET MOUNT SALINE/INK: CPT | Performed by: NURSE PRACTITIONER

## 2019-04-15 PROCEDURE — 85025 COMPLETE CBC W/AUTO DIFF WBC: CPT | Performed by: NURSE PRACTITIONER

## 2019-04-15 PROCEDURE — 81001 URINALYSIS AUTO W/SCOPE: CPT | Performed by: NURSE PRACTITIONER

## 2019-04-15 PROCEDURE — 87086 URINE CULTURE/COLONY COUNT: CPT | Performed by: NURSE PRACTITIONER

## 2019-04-15 PROCEDURE — 99213 OFFICE O/P EST LOW 20 MIN: CPT | Performed by: NURSE PRACTITIONER

## 2019-04-15 PROCEDURE — 36415 COLL VENOUS BLD VENIPUNCTURE: CPT | Performed by: NURSE PRACTITIONER

## 2019-04-15 RX ORDER — METRONIDAZOLE 250 MG/1
250 TABLET ORAL 3 TIMES DAILY
Qty: 21 TABLET | Refills: 0 | Status: SHIPPED | OUTPATIENT
Start: 2019-04-15 | End: 2019-04-22

## 2019-04-15 ASSESSMENT — PAIN SCALES - GENERAL: PAINLEVEL: EXTREME PAIN (8)

## 2019-04-15 ASSESSMENT — MIFFLIN-ST. JEOR: SCORE: 1463.6

## 2019-04-15 NOTE — PROGRESS NOTES
SUBJECTIVE:   Maria Del Rosario Barger is a 23 year old female who presents to clinic today for the following   health issues:    Hematuria x 1 and right lower quadrant pain since last night.    Last night when patient laid down to go to bed, noticed a significant discomfort in her right low back that was wrapping around to her right lower quadrant. At that time, began having episodes of chills and sweats but did not take her temperature. Was able to fall asleep, woke up around 11 and went to the bathroom. Did not have any discomfort during urination, but afterwards noticed the toilet water was pink. Wiped several times and is confident it was not vaginal bleeding, but thinks it was urinary. That has not occurred again.  Also feeling decreased appetite, nausea, dizzy. Denies current vaginal bleeding, discharge, itching, odor or other urinary symptoms. The right lower quadrant pain has been dull/achy while she is up and moving it is constant. When she lays down it is intermittent. Feels best when in fetal position. Tylenol has not helped. Early ultrasound showed a right corpus luteum cyst.  Denies constipation. No history of kidney stones.     Additional history: as documented    Reviewed  and updated as needed this visit by clinical staff  Tobacco  Allergies  Meds  Problems  Med Hx  Surg Hx  Fam Hx  Soc Hx          Reviewed and updated as needed this visit by Provider  Tobacco  Allergies  Meds  Problems  Med Hx  Surg Hx  Fam Hx  Soc Hx          Patient Active Problem List   Diagnosis     KEISHA (generalized anxiety disorder)     Family history of von Willebrand disease     Family history of factor V deficiency     Low vitamin D level     Atopic rhinitis     Supervision of other normal pregnancy, antepartum     Past Surgical History:   Procedure Laterality Date     COMBINED ESOPHAGOSCOPY, GASTROSCOPY, DUODENOSCOPY (EGD) WITH CO2 INSUFFLATION Bilateral 7/25/2016    Procedure: COMBINED ESOPHAGOSCOPY, GASTROSCOPY,  "DUODENOSCOPY (EGD) WITH CO2 INSUFFLATION;  Surgeon: Warren Fernandez MD;  Location: [facility]     ESOPHAGOSCOPY, GASTROSCOPY, DUODENOSCOPY (EGD), COMBINED N/A 2016    Procedure: COMBINED ESOPHAGOSCOPY, GASTROSCOPY, DUODENOSCOPY (EGD), BIOPSY SINGLE OR MULTIPLE;  Surgeon: Warren Fernandez MD;  Location: [facility]     LAPAROSCOPIC CHOLECYSTECTOMY       TONSILLECTOMY       trigger thumb Right 1997       Social History     Tobacco Use     Smoking status: Former Smoker     Last attempt to quit: 2019     Years since quittin.1     Smokeless tobacco: Never Used   Substance Use Topics     Alcohol use: No     Frequency: Never     History reviewed. No pertinent family history.        ROS:  Constitutional, HEENT, cardiovascular, pulmonary, gi and gu systems are negative, except as otherwise noted.    OBJECTIVE:     /70 (BP Location: Right arm, Patient Position: Sitting, Cuff Size: Adult Regular)   Pulse 108   Temp 98.2  F (36.8  C) (Oral)   Ht 1.619 m (5' 3.75\")   Wt 72.8 kg (160 lb 6.4 oz)   LMP 2019 (Approximate)   SpO2 97%   BMI 27.75 kg/m    Body mass index is 27.75 kg/m .  GENERAL: healthy, alert and moderate distress. Pallor noted  RESP: lungs clear to auscultation - no rales, rhonchi or wheezes  CV: regular rate and rhythm, normal S1 S2, no S3 or S4, no murmur, click or rub, no peripheral edema and peripheral pulses strong  ABDOMEN: soft, without hepatosplenomegaly. Positive for tenderness RIGHT LOWER QUADRANT and rebound tenderness.    (female): normal female external genitalia, normal urethral meatus, vaginal mucosa, normal cervix/adnexa/uterus without masses or discharge. Uterus approximately 10-12 weeks size. See OB vitals  MS: no gross musculoskeletal defects noted, no edema  SKIN: no suspicious lesions or rashes, warm to palpation over abdomen  PSYCH: mentation appears normal, affect normal/bright    Diagnostic Test Results:  Results for orders placed or performed in " visit on 04/15/19 (from the past 24 hour(s))   UA with Microscopic   Result Value Ref Range    Color Urine Yellow     Appearance Urine Clear     Glucose Urine Negative NEG^Negative mg/dL    Bilirubin Urine Negative NEG^Negative    Ketones Urine 15 (A) NEG^Negative mg/dL    Specific Gravity Urine 1.025 1.003 - 1.035    pH Urine 6.0 5.0 - 7.0 pH    Protein Albumin Urine Negative NEG^Negative mg/dL    Urobilinogen Urine 0.2 0.2 - 1.0 EU/dL    Nitrite Urine Negative NEG^Negative    Blood Urine Negative NEG^Negative    Leukocyte Esterase Urine Negative NEG^Negative    Source Midstream Urine     WBC Urine 0 - 5 OTO5^0 - 5 /HPF    RBC Urine O - 2 OTO2^O - 2 /HPF    Squamous Epithelial /LPF Urine Few FEW^Few /LPF    Mucous Urine Present (A) NEG^Negative /LPF   Wet prep   Result Value Ref Range    Specimen Description Vagina     Wet Prep No Trichomonas seen     Wet Prep No yeast seen     Wet Prep Rare  Clue cells seen   (A)     Wet Prep Rare  WBC'S seen      CBC with platelets differential   Result Value Ref Range    WBC 12.5 (H) 4.0 - 11.0 10e9/L    RBC Count 4.43 3.8 - 5.2 10e12/L    Hemoglobin 13.3 11.7 - 15.7 g/dL    Hematocrit 37.9 35.0 - 47.0 %    MCV 86 78 - 100 fl    MCH 30.0 26.5 - 33.0 pg    MCHC 35.1 31.5 - 36.5 g/dL    RDW 12.3 10.0 - 15.0 %    Platelet Count 223 150 - 450 10e9/L    % Neutrophils 90.0 %    % Lymphocytes 5.0 %    % Monocytes 5.0 %    Absolute Neutrophil 11.3 (H) 1.6 - 8.3 10e9/L    Absolute Lymphocytes 0.6 (L) 0.8 - 5.3 10e9/L    Absolute Monocytes 0.6 0.0 - 1.3 10e9/L    RBC Morphology Normal     Platelet Estimate       Automated count confirmed.  Platelet morphology is normal.    Diff Method Automated Method        ASSESSMENT/PLAN:   1. RLQ abdominal pain  We reviewed her symptoms, exam and labs. WBC slightly elevated, but can be normal in pregnancy. Reviewed possible etiologies of her pain and recommend she proceed to ED for further evaluation and possible need for imaging. Patient's mother is  with her and will drive her to [facility] ED. Has prenatal appointment next week and will plan follow up then, but to return to clinic sooner if needed. Patient is given an opportunity to ask questions and have them answered.  - Wet prep  - UA with Microscopic  - Urine Culture Aerobic Bacterial  - CBC with platelets differential    2. BV (bacterial vaginosis)  Based on wet prep, will treat, though discussed this is not likely sole etiology of her symptoms. Encouraged to take with food.  - metroNIDAZOLE (FLAGYL) 250 MG tablet; Take 1 tablet (250 mg) by mouth 3 times daily  Dispense: 21 tablet; Refill: 0      MAGED Hoyt [facility]

## 2019-04-15 NOTE — PROGRESS NOTES
"  SUBJECTIVE:   Maria Del Rosario Barger is a 23 year old female who presents to clinic today for the following   health issues:  {Provider please address medication reconciliation discrepancies--rooming staff   please delete if no med/rec issues}    {Superlists:452609}    {additional problems for provider to add:212724}    Additional history: {NONE - AS DOCUMENTED:042246::\"as documented\"}    Reviewed  and updated as needed this visit by clinical staff  Tobacco  Allergies  Meds  Problems  Med Hx  Surg Hx  Fam Hx  Soc Hx          Reviewed and updated as needed this visit by Provider  Tobacco  Allergies  Meds  Problems  Med Hx  Surg Hx  Fam Hx  Soc Hx          {HIST REVIEW/ LINKS 2:234413}    {PROVIDER CHARTING PREFERENCE:760740}      "

## 2019-04-15 NOTE — TELEPHONE ENCOUNTER
Caller states she is 11 weeks pregnant and is having some left lower abdominal pain. Caller rates pain 4/10. Caller states she is having some blood in her urine when she wipes and some blood in the toilet. Caller states blood in not coming from vaginal area. Bleeding is not heavy enough to where a pad is needed. Triage guidelines recommend to see pcp within 24 hours. Caller verbalized and understands directives.  Reason for Disposition    Blood in urine, but all triage questions negative  (Exception: could be normal menstrual bleeding)    Additional Information    Negative: Shock suspected (e.g., cold/pale/clammy skin, too weak to stand, low BP, rapid pulse)    Negative: Sounds like a life-threatening emergency to the triager    Negative: Urinary catheter, questions about    Negative: Recent back or abdominal injury    Negative: Recent genital injury    Negative: [1] Unable to urinate (or only a few drops) > 4 hours AND [2] bladder feels very full (e.g., palpable bladder or strong urge to urinate)    Negative: Passing pure blood or large blood clots (i.e., size > a dime) (Exception: criss or small strands)    Negative: Fever > 100.5 F (38.1 C)    Negative: Patient sounds very sick or weak to the triager    Negative: Known sickle cell disease    Negative: Taking Coumadin (warfarin) or other strong blood thinner, or known bleeding disorder (e.g., thrombocytopenia)    Negative: Side (flank) or back pain present    Negative: Pain or burning with passing urine    Negative: [1] Pink or red-colored urine and likely from food (beets, rhubarb, red food dye) AND [2] lasts > 24 hours after stopping food    Protocols used: URINE - BLOOD IN-ADULT-

## 2019-04-16 LAB
BACTERIA SPEC CULT: NORMAL
SPECIMEN SOURCE: NORMAL

## 2019-04-22 ENCOUNTER — PRENATAL OFFICE VISIT (OUTPATIENT)
Dept: OBGYN | Facility: CLINIC | Age: 24
End: 2019-04-22
Payer: COMMERCIAL

## 2019-04-22 VITALS
HEART RATE: 79 BPM | OXYGEN SATURATION: 98 % | TEMPERATURE: 98 F | WEIGHT: 164.8 LBS | SYSTOLIC BLOOD PRESSURE: 119 MMHG | HEIGHT: 64 IN | DIASTOLIC BLOOD PRESSURE: 71 MMHG | BODY MASS INDEX: 28.13 KG/M2

## 2019-04-22 DIAGNOSIS — Z34.80 SUPERVISION OF OTHER NORMAL PREGNANCY, ANTEPARTUM: Primary | ICD-10-CM

## 2019-04-22 PROCEDURE — 99207 ZZC PRENATAL VISIT: CPT | Performed by: NURSE PRACTITIONER

## 2019-04-22 ASSESSMENT — PAIN SCALES - GENERAL: PAINLEVEL: NO PAIN (0)

## 2019-04-22 ASSESSMENT — MIFFLIN-ST. JEOR: SCORE: 1483.56

## 2019-04-22 NOTE — PROGRESS NOTES
Patient presents for routine prenatal visit. Prenatal flowsheet reviewed and updated as needed.  Denies vaginal bleeding, loss of fluid, contractions or cramping.  Patient without complaint. Seen last week for right flank and right lower quadrant pain. Sent to ED and work up negative. Pain resolved by next morning. Will monitor.  Discussed quad screen if desired on return to clinic.  Advice as per Anticipatory Guidance/Checklist updated.  PE: See OB vitals    Questions asked and answered. Next OB visit in 4 week(s) with Dr. Menjivar.    Lydia LYNNE CNP

## 2019-05-20 ENCOUNTER — PRENATAL OFFICE VISIT (OUTPATIENT)
Dept: OBGYN | Facility: CLINIC | Age: 24
End: 2019-05-20
Payer: COMMERCIAL

## 2019-05-20 VITALS
WEIGHT: 169.4 LBS | SYSTOLIC BLOOD PRESSURE: 117 MMHG | DIASTOLIC BLOOD PRESSURE: 76 MMHG | BODY MASS INDEX: 29.31 KG/M2 | OXYGEN SATURATION: 99 % | HEART RATE: 80 BPM

## 2019-05-20 DIAGNOSIS — Z34.80 SUPERVISION OF OTHER NORMAL PREGNANCY, ANTEPARTUM: Primary | ICD-10-CM

## 2019-05-20 PROCEDURE — 99207 ZZC PRENATAL VISIT: CPT | Performed by: NURSE PRACTITIONER

## 2019-05-20 NOTE — PROGRESS NOTES
Patient presents for routine prenatal visit. Prenatal flowsheet reviewed and updated as needed.  Denies vaginal bleeding, loss of fluid, contractions or cramping.  Patient without complaint. Screening ultrasound ordered.  I discussed with the patient the option of maternal serum screening for chromosomal abnormalities (such as Trisomy 18 and 21) and neural tube defects.  We discussed the screening nature of this test and the potential for false negative and false positive results and the possible need for additional testing including Level 2 ultrasound and amniocentesis. She is given the opportunity to ask questions and have them answered. She declines testing.  Advice as per Anticipatory Guidance/Checklist updated.  PE: See OB vitals    Questions asked and answered. Next OB visit in 4 week(s) with Dr. Menjivar.    Lydia LYNNE CNP

## 2019-05-22 ENCOUNTER — MYC MEDICAL ADVICE (OUTPATIENT)
Dept: OBGYN | Facility: CLINIC | Age: 24
End: 2019-05-22

## 2019-05-23 NOTE — TELEPHONE ENCOUNTER
Unable to reach patient via phone. Left message to call clinic back.     Will respond via [employer].    Gayathri Thomas RN

## 2019-05-28 ENCOUNTER — PRENATAL OFFICE VISIT (OUTPATIENT)
Dept: OBGYN | Facility: CLINIC | Age: 24
End: 2019-05-28
Payer: COMMERCIAL

## 2019-05-28 VITALS
SYSTOLIC BLOOD PRESSURE: 117 MMHG | WEIGHT: 171 LBS | DIASTOLIC BLOOD PRESSURE: 79 MMHG | TEMPERATURE: 98 F | BODY MASS INDEX: 29.19 KG/M2 | HEART RATE: 90 BPM | HEIGHT: 64 IN | OXYGEN SATURATION: 96 %

## 2019-05-28 DIAGNOSIS — L29.9 ITCHING: ICD-10-CM

## 2019-05-28 DIAGNOSIS — Z34.80 SUPERVISION OF OTHER NORMAL PREGNANCY, ANTEPARTUM: Primary | ICD-10-CM

## 2019-05-28 PROCEDURE — 99207 ZZC PRENATAL VISIT: CPT | Performed by: NURSE PRACTITIONER

## 2019-05-28 ASSESSMENT — MIFFLIN-ST. JEOR: SCORE: 1511.68

## 2019-05-28 ASSESSMENT — PAIN SCALES - GENERAL: PAINLEVEL: MODERATE PAIN (5)

## 2019-05-28 NOTE — PROGRESS NOTES
Patient presents for problem prenatal visit. Prenatal flowsheet reviewed and updated as needed.  Denies vaginal bleeding, loss of fluid, contractions or cramping.    Patient sent a message last week with concerns of cholestasis due to intense itching that had worsened. Itching is on her back, hands-palms and backs, feet, legs, abdomen. Wakes her during the night. No visible rash, pustules, papules. No changes or new products, clothing, etc. No recent travel. Cortisone worked initially, but not anymore.  Maintaining hydration.     Screening ultrasound scheduled, declined Quad screen at last visit.     Advice as per Anticipatory Guidance/Checklist updated.  PE: See OB vitals    We discussed that it would be fairly early for this to be cholestasis-typically occurs late second and into third trimester. She is quite concerned, will have her return to clinic for labs.  Will start PO Zyrtec once daily and 50 mg Benadryl at bedtime. If symptoms not adequately managed, will change to Hydroxyzine. Continue adequate fluid intake. Warning signs to monitor for and report immediately discussed with patient and she verbalizes understanding.    Questions asked and answered. Next OB visit in 4 week(s) with Dr. Menjivar.    Lydia LYNNE CNP

## 2019-05-28 NOTE — PROGRESS NOTES
"Patient presents for routine prenatal visit. Prenatal flowsheet reviewed and updated as needed.  Denies vaginal bleeding, loss of fluid, contractions or cramping.  Patient {w-w/o:193940::\"without\"} complaint. ***  Advice as per Anticipatory Guidance/Checklist updated.  PE: See OB vitals    Questions asked and answered. Next OB visit in *** week(s) with Dr. Menjivar.    Lydia Lucio APRN CNP    "

## 2019-06-12 DIAGNOSIS — L29.9 ITCHING: ICD-10-CM

## 2019-06-12 LAB
ALBUMIN SERPL-MCNC: 3.3 G/DL (ref 3.4–5)
ALP SERPL-CCNC: 65 U/L (ref 40–150)
ALT SERPL W P-5'-P-CCNC: 16 U/L (ref 0–50)
AST SERPL W P-5'-P-CCNC: 10 U/L (ref 0–45)
BILIRUB DIRECT SERPL-MCNC: <0.1 MG/DL (ref 0–0.2)
BILIRUB SERPL-MCNC: 0.3 MG/DL (ref 0.2–1.3)
PROT SERPL-MCNC: 7.4 G/DL (ref 6.8–8.8)

## 2019-06-12 PROCEDURE — 99000 SPECIMEN HANDLING OFFICE-LAB: CPT | Performed by: NURSE PRACTITIONER

## 2019-06-12 PROCEDURE — 80076 HEPATIC FUNCTION PANEL: CPT | Performed by: NURSE PRACTITIONER

## 2019-06-12 PROCEDURE — 83789 MASS SPECTROMETRY QUAL/QUAN: CPT | Mod: 90 | Performed by: NURSE PRACTITIONER

## 2019-06-12 PROCEDURE — 36415 COLL VENOUS BLD VENIPUNCTURE: CPT | Performed by: NURSE PRACTITIONER

## 2019-06-13 ENCOUNTER — MYC MEDICAL ADVICE (OUTPATIENT)
Dept: OBGYN | Facility: CLINIC | Age: 24
End: 2019-06-13

## 2019-06-13 NOTE — LETTER
[facility]  [id number] [facility] [id number] [phone]    RE: Maria Del Rosario Barger  : 1995    DATE: 2019      To Whom It May Concern,        Maria Del Rosario Barger is under my medical care for her pregnancy. At this time, we are recommending she be allowed to sit while at work as she needs to help manage some symptoms she is experiencing from prolonged periods of standing and also please provide a supportive chair with a back on it to allow proper body mechanics while she is sitting.  We appreciate your cooperation in this matter.    Thank you.      Sincerely,            Lydia LYNNE CNP

## 2019-06-14 ENCOUNTER — ANCILLARY PROCEDURE (OUTPATIENT)
Dept: ULTRASOUND IMAGING | Facility: CLINIC | Age: 24
End: 2019-06-14
Attending: NURSE PRACTITIONER
Payer: COMMERCIAL

## 2019-06-14 DIAGNOSIS — Z34.80 SUPERVISION OF OTHER NORMAL PREGNANCY, ANTEPARTUM: ICD-10-CM

## 2019-06-14 LAB
BILE AC SERPL-SCNC: 0.8 UMOL/L (ref 0–2.5)
CDCAE SERPL-SCNC: 0.8 UMOL/L (ref 0–3.4)
CHOLATE SERPL-SCNC: 2.2 UMOL/L (ref 0–7)
DO-CHOLATE SERPL-SCNC: 0.3 UMOL/L (ref 0–1.9)
URSODEOXYCHOLATE SERPL-SCNC: 0.3 UMOL/L (ref 0–1)

## 2019-06-14 PROCEDURE — 76805 OB US >/= 14 WKS SNGL FETUS: CPT

## 2019-06-19 ENCOUNTER — PRENATAL OFFICE VISIT (OUTPATIENT)
Dept: OBGYN | Facility: CLINIC | Age: 24
End: 2019-06-19
Payer: COMMERCIAL

## 2019-06-19 VITALS
WEIGHT: 171.2 LBS | BODY MASS INDEX: 29.23 KG/M2 | TEMPERATURE: 97.8 F | OXYGEN SATURATION: 97 % | SYSTOLIC BLOOD PRESSURE: 113 MMHG | DIASTOLIC BLOOD PRESSURE: 73 MMHG | HEIGHT: 64 IN | HEART RATE: 73 BPM

## 2019-06-19 DIAGNOSIS — Z34.80 SUPERVISION OF OTHER NORMAL PREGNANCY, ANTEPARTUM: Primary | ICD-10-CM

## 2019-06-19 PROCEDURE — 99207 ZZC PRENATAL VISIT: CPT | Performed by: NURSE PRACTITIONER

## 2019-06-19 ASSESSMENT — PAIN SCALES - GENERAL: PAINLEVEL: NO PAIN (0)

## 2019-06-19 ASSESSMENT — MIFFLIN-ST. JEOR: SCORE: 1512.59

## 2019-06-19 NOTE — PROGRESS NOTES
Patient presents for routine prenatal visit. Prenatal flowsheet reviewed and updated as needed.  Denies vaginal bleeding, loss of fluid, contractions or cramping.  Patient without complaint. Discussed ultrasound and will schedule follow up in 4 weeks.  Plan labs on return to clinic.  Advice as per Anticipatory Guidance/Checklist updated.  PE: See OB vitals    Questions asked and answered. Next OB visit in 4 week(s) with Dr. Menjivar.    Lydia LYNNE CNP

## 2019-06-28 ENCOUNTER — ANCILLARY PROCEDURE (OUTPATIENT)
Dept: ULTRASOUND IMAGING | Facility: CLINIC | Age: 24
End: 2019-06-28
Attending: NURSE PRACTITIONER
Payer: COMMERCIAL

## 2019-06-28 PROCEDURE — 76816 OB US FOLLOW-UP PER FETUS: CPT

## 2019-07-12 ENCOUNTER — PRENATAL OFFICE VISIT (OUTPATIENT)
Dept: OBGYN | Facility: CLINIC | Age: 24
End: 2019-07-12
Payer: COMMERCIAL

## 2019-07-12 VITALS
HEIGHT: 64 IN | OXYGEN SATURATION: 97 % | DIASTOLIC BLOOD PRESSURE: 81 MMHG | SYSTOLIC BLOOD PRESSURE: 123 MMHG | TEMPERATURE: 98.4 F | BODY MASS INDEX: 30.35 KG/M2 | WEIGHT: 177.8 LBS | HEART RATE: 74 BPM

## 2019-07-12 DIAGNOSIS — Z34.80 SUPERVISION OF OTHER NORMAL PREGNANCY, ANTEPARTUM: Primary | ICD-10-CM

## 2019-07-12 DIAGNOSIS — Z34.80 SUPERVISION OF OTHER NORMAL PREGNANCY, ANTEPARTUM: ICD-10-CM

## 2019-07-12 DIAGNOSIS — R35.0 URINARY FREQUENCY: ICD-10-CM

## 2019-07-12 LAB
ALBUMIN UR-MCNC: NEGATIVE MG/DL
APPEARANCE UR: CLEAR
BACTERIA #/AREA URNS HPF: ABNORMAL /HPF
BILIRUB UR QL STRIP: NEGATIVE
COLOR UR AUTO: YELLOW
GLUCOSE 1H P 50 G GLC PO SERPL-MCNC: 93 MG/DL (ref 60–129)
GLUCOSE UR STRIP-MCNC: NEGATIVE MG/DL
HGB BLD-MCNC: 13.1 G/DL (ref 11.7–15.7)
HGB UR QL STRIP: NEGATIVE
KETONES UR STRIP-MCNC: NEGATIVE MG/DL
LEUKOCYTE ESTERASE UR QL STRIP: NEGATIVE
NITRATE UR QL: NEGATIVE
NON-SQ EPI CELLS #/AREA URNS LPF: ABNORMAL /LPF
PH UR STRIP: 5.5 PH (ref 5–7)
RBC #/AREA URNS AUTO: ABNORMAL /HPF
SOURCE: ABNORMAL
SP GR UR STRIP: 1.01 (ref 1–1.03)
UROBILINOGEN UR STRIP-ACNC: 0.2 EU/DL (ref 0.2–1)
WBC #/AREA URNS AUTO: ABNORMAL /HPF

## 2019-07-12 PROCEDURE — 87086 URINE CULTURE/COLONY COUNT: CPT | Performed by: NURSE PRACTITIONER

## 2019-07-12 PROCEDURE — 99207 ZZC PRENATAL VISIT: CPT | Performed by: NURSE PRACTITIONER

## 2019-07-12 PROCEDURE — 86780 TREPONEMA PALLIDUM: CPT | Performed by: NURSE PRACTITIONER

## 2019-07-12 PROCEDURE — 36415 COLL VENOUS BLD VENIPUNCTURE: CPT | Performed by: NURSE PRACTITIONER

## 2019-07-12 PROCEDURE — 82950 GLUCOSE TEST: CPT | Performed by: NURSE PRACTITIONER

## 2019-07-12 PROCEDURE — 85018 HEMOGLOBIN: CPT | Performed by: NURSE PRACTITIONER

## 2019-07-12 PROCEDURE — 81001 URINALYSIS AUTO W/SCOPE: CPT | Performed by: NURSE PRACTITIONER

## 2019-07-12 ASSESSMENT — PAIN SCALES - GENERAL: PAINLEVEL: NO PAIN (0)

## 2019-07-12 ASSESSMENT — MIFFLIN-ST. JEOR: SCORE: 1537.53

## 2019-07-12 NOTE — PROGRESS NOTES
Patient presents for routine prenatal visit. Prenatal flowsheet reviewed and updated as needed.  Denies vaginal bleeding, loss of fluid, contractions or cramping.  Patient with complaint. Yesterday had increased urinary frequency-even over her norm during pregnancy and had one time during voiding that she felt pressure. That has not happened again. No dysuria, hematuria or other urinary symptoms, no abnormal vaginal symptoms.  Check UA/UC today and treat if appropriate. Patient will monitor symptoms otherwise and aware when she should follow up.  24 week labs today.  Tdap on return to clinic.   Advice as per Anticipatory Guidance/Checklist updated.  PE: See OB vitals    Questions asked and answered. Next OB visit in 4 week(s) already scheduled with Dr. Menjivar.    Lydia LYNNE CNP

## 2019-07-13 LAB
BACTERIA SPEC CULT: NORMAL
SPECIMEN SOURCE: NORMAL
T PALLIDUM AB SER QL: NONREACTIVE

## 2019-08-12 ENCOUNTER — PRENATAL OFFICE VISIT (OUTPATIENT)
Dept: OBGYN | Facility: CLINIC | Age: 24
End: 2019-08-12
Payer: COMMERCIAL

## 2019-08-12 VITALS
BODY MASS INDEX: 32.45 KG/M2 | WEIGHT: 187.6 LBS | OXYGEN SATURATION: 98 % | SYSTOLIC BLOOD PRESSURE: 109 MMHG | HEART RATE: 81 BPM | DIASTOLIC BLOOD PRESSURE: 68 MMHG

## 2019-08-12 DIAGNOSIS — Z23 NEED FOR TDAP VACCINATION: Primary | ICD-10-CM

## 2019-08-12 DIAGNOSIS — Z34.80 SUPERVISION OF OTHER NORMAL PREGNANCY, ANTEPARTUM: ICD-10-CM

## 2019-08-12 PROCEDURE — 99207 ZZC PRENATAL VISIT: CPT | Performed by: OBSTETRICS & GYNECOLOGY

## 2019-08-12 RX ORDER — PRENATAL VIT/IRON FUM/FOLIC AC 27MG-0.8MG
1 TABLET ORAL DAILY
COMMUNITY
End: 2022-02-25

## 2019-08-12 NOTE — PROGRESS NOTES
Patient presents for routine prenatal visit at 28w3d.  Patient without complaint.   PE: See OB vitals  Body mass index is 32.45 kg/m .    Doing well.  No concerns today.  No vaginal bleeding, loss of fluid, or contractions    Questions asked and answered.      Geo Menjivar MD FACOG

## 2019-09-09 ENCOUNTER — PRENATAL OFFICE VISIT (OUTPATIENT)
Dept: OBGYN | Facility: CLINIC | Age: 24
End: 2019-09-09
Payer: COMMERCIAL

## 2019-09-09 VITALS
WEIGHT: 193 LBS | BODY MASS INDEX: 33.39 KG/M2 | DIASTOLIC BLOOD PRESSURE: 87 MMHG | SYSTOLIC BLOOD PRESSURE: 133 MMHG | HEART RATE: 93 BPM | OXYGEN SATURATION: 97 %

## 2019-09-09 DIAGNOSIS — Z34.80 SUPERVISION OF OTHER NORMAL PREGNANCY, ANTEPARTUM: Primary | ICD-10-CM

## 2019-09-09 PROCEDURE — 99207 ZZC PRENATAL VISIT: CPT | Performed by: OBSTETRICS & GYNECOLOGY

## 2019-09-09 NOTE — PATIENT INSTRUCTIONS
If you have any questions regarding your visit, Please contact your care team.    [employer]: [phone]      [facility] CLINIC HOURS TELEPHONE NUMBER   MD Crystal Duron CMA Lisa -    FRANCIA Trinh       Monday:       7:30-4:30 [place]  Wednesday:       7:30-4:30 [place]  Thursday:       7:30-1:30 [place]  Friday:       7:30-11:30 [facility]  [address] [place]  [address]  [phone] ask for [facility]  [address] [place] [address]  [phone] ask for [facility]    Imaging Scheduling for [place]:  [phone]    Imaging Scheduling for [place]: [phone]       Urgent Care locations:    [facility] Saturday and Sunday   9 am - 5 pm    Monday-Friday   12 pm - 8 pm  Saturday and Sunday   9 am - 5 pm   [phone]     [facility] Labor and Delivery:  [phone]    If you need a medication refill, please contact your pharmacy. Please allow 3 business days for your refill to be completed.  As always, Thank you for trusting us with your healthcare needs!

## 2019-09-09 NOTE — PROGRESS NOTES
Patient presents for routine prenatal visit at 32w3d.  Patient without complaint.   PE: See OB vitals  Body mass index is 33.39 kg/m .    Doing well.  No concerns today.  No vaginal bleeding, loss of fluid, or contractions  Questions asked and answered.      Geo Menjivar MD FACOG

## 2019-09-26 ENCOUNTER — PRENATAL OFFICE VISIT (OUTPATIENT)
Dept: OBGYN | Facility: CLINIC | Age: 24
End: 2019-09-26
Payer: COMMERCIAL

## 2019-09-26 VITALS
HEART RATE: 102 BPM | SYSTOLIC BLOOD PRESSURE: 143 MMHG | BODY MASS INDEX: 34.18 KG/M2 | DIASTOLIC BLOOD PRESSURE: 84 MMHG | WEIGHT: 197.6 LBS

## 2019-09-26 DIAGNOSIS — Z34.80 SUPERVISION OF OTHER NORMAL PREGNANCY, ANTEPARTUM: Primary | ICD-10-CM

## 2019-09-26 PROCEDURE — 99207 ZZC PRENATAL VISIT: CPT | Performed by: OBSTETRICS & GYNECOLOGY

## 2019-09-26 NOTE — PROGRESS NOTES
34w6d   No HA, visual changes, or epigastric pains..Discussed preeclampsia symptoms and signs PNE classes planned/done. RTC 2 wk/prn.    ICD-10-CM    1. Supervision of other normal pregnancy, antepartum Z34.80      CEPHAS AGBEH, MD.

## 2019-09-26 NOTE — PATIENT INSTRUCTIONS
If you have any questions regarding your visit, Please contact your care team.  [employer] [facility]: [phone]  [facility] CLINIC HOURS TELEPHONE NUMBER   Cephas Agbeh, M.D. Lisa -       Regina Ramos         Monday-[place]    8:00a.m-4:45 p.m    Tuesday--[place]     8:00a.m-4:45 p.m.    Thursday-[place]    8:00a.m-4:45 p.m.    Friday-[place]    8:00a.m-4:45 p.m    [facility]   [address] [place] [address]   [phone]-Ask for [facility]   Fax [phone]   Spzyipu-[phone]     [facility] Labor and Delivery   [address] [place] [address] [place] [address]   [phone]    [facility]  [address] [address]  [phone]  Kosvtqs-[phone]   Urgent Care locations:    [facility] Monday-Friday  5 pm - 9 pm  Saturday and Sunday   9 am - 5 pm   Monday-Friday   5 pm - 9 pm  Saturday and Sunday  9 am - 5 pm    [phone]   If you need a medication refill, please contact your pharmacy. Please allow 3 business days for your refill to be completed.  As always, Thank you for trusting us with your healthcare needs!

## 2019-10-01 ENCOUNTER — TELEPHONE (OUTPATIENT)
Dept: OBGYN | Facility: CLINIC | Age: 24
End: 2019-10-01

## 2019-10-01 ENCOUNTER — MYC MEDICAL ADVICE (OUTPATIENT)
Dept: OBGYN | Facility: CLINIC | Age: 24
End: 2019-10-01

## 2019-10-01 NOTE — TELEPHONE ENCOUNTER
Left pt a detailed message per demographics listed that it is okay to leave VM.    Gayathri Thomas RN

## 2019-10-01 NOTE — LETTER
[facility]  [address] [facility] [address]  [phone]    2019    RE: Maria Del Rosario Barger   1995    To Whom It May Concern:    Maria Del Rosario Barger is currently under our care for a confirmed pregnancy. Her estimated due date is 2019.  She is in the third trimester of her pregnancy.     Routine teeth cleaning is safe and encouraged during all stages of pregnancy.    If possible, indicated dental procedures should be performed after 12 weeks gestation and the following are acceptable and reasonably safe: use of local anesthestics such as lidocaine or procaine with or without epinephrine, dental Xrays with appropriate shielding, penicillin or ampicillin antibiotics, and acetaminophen or narcotic analgesics.     Elective or cosmetic procedures should be deferred until after the pregnancy.  Extraction of impacted teeth may be performed.     I hope this information is sufficient for your needs.  If you have any additional questions regarding this matter please contact our office.  Thank you.    Sincerely,      Lydia LYNNE CNP

## 2019-10-01 NOTE — TELEPHONE ENCOUNTER
RN consulted with in-clinic provider who advised that it is okay to have tooth extraction with use of novocaine at current gestation age in pregnancy.     Shaye Durand RN on 10/1/2019 at 7:34 AM

## 2019-10-01 NOTE — TELEPHONE ENCOUNTER
Patient states she would like to know if if will be OK if she has another tooth removed with novocain.  Please call.    Thank you.

## 2019-10-07 ENCOUNTER — PRENATAL OFFICE VISIT (OUTPATIENT)
Dept: OBGYN | Facility: CLINIC | Age: 24
End: 2019-10-07
Payer: COMMERCIAL

## 2019-10-07 ENCOUNTER — TELEPHONE (OUTPATIENT)
Dept: OBGYN | Facility: CLINIC | Age: 24
End: 2019-10-07

## 2019-10-07 VITALS — WEIGHT: 196 LBS | BODY MASS INDEX: 33.91 KG/M2 | DIASTOLIC BLOOD PRESSURE: 92 MMHG | SYSTOLIC BLOOD PRESSURE: 142 MMHG

## 2019-10-07 DIAGNOSIS — Z34.80 SUPERVISION OF OTHER NORMAL PREGNANCY, ANTEPARTUM: ICD-10-CM

## 2019-10-07 DIAGNOSIS — Z36.85 ANTENATAL SCREENING FOR STREPTOCOCCUS B: Primary | ICD-10-CM

## 2019-10-07 PROCEDURE — 99207 ZZC PRENATAL VISIT: CPT | Performed by: OBSTETRICS & GYNECOLOGY

## 2019-10-07 PROCEDURE — 76815 OB US LIMITED FETUS(S): CPT | Performed by: OBSTETRICS & GYNECOLOGY

## 2019-10-07 PROCEDURE — 87653 STREP B DNA AMP PROBE: CPT | Performed by: OBSTETRICS & GYNECOLOGY

## 2019-10-07 NOTE — TELEPHONE ENCOUNTER
Patient needs to have an external cephalic version scheduled either this week or next week.  I have very limited openings, so we should look to see if one of the other partners (who does versions) could do it.  Let me know if you have questions on scheduling this.  Geo Menjivar MD FACOG

## 2019-10-07 NOTE — TELEPHONE ENCOUNTER
Pt was sent to L&D by Dr. Menjivar from her prenatal appt today due to high blood pressure.  They sent pt home and advised to follow up with OB provider at her next prenatal appt.  Pt's next prenatal appt is not until 10/17/19.  Pt states her baby is breech.  She states her BP is only elevated when she is up and moving around but as soon as she lays down and rests it is normal.  Pt is wanting to know if she needs to see Dr. Menjivar sooner then 10/17.  She is also wanting to know if she should go on bed rest due to her increasing blood pressures with activity.  She works every day and she says she does a lot of walking at work.  Will route to Dr. Menjivar for further recommendations.    Gayathri Thomas RN

## 2019-10-07 NOTE — TELEPHONE ENCOUNTER
Pt was just seen by Dr. Menjivar today for a prenatal visit.  Per OV notes, pt to return to clinic in one week.    Left pt a detailed message letting her know that Dr. Menjivar had recommended f/u in one week.  I also want to know what pt's current symptoms are and what her blood pressures are.    Gayathri Thomas RN

## 2019-10-07 NOTE — PROGRESS NOTES
Patient presents for routine prenatal visit at 36w3d.  Patient without complaint. Had a headache over the weekend, resolved now.  PE: See OB vitals  Body mass index is 33.91 kg/m .  No vaginal bleeding, LOF, contractions.  No HA, RUQ pain, N/V, visual changes.    Group B Strep was done  Transabdominal ultrasound was performed to determine presentation.  A viable intrauterine pregnancy was seen.  The fetus is noted in VERTEX and BREECH .  Fetal heart motion was visualized at 120 bpm.    Normal Amniotic Fluid Volume is present.  Questions asked and answered.  Given the BP, to LABOR AND DELIVER for evaluation.  If she doesn't need to be delivered urgently, then we can look at doing a cephalic version and I did discuss that with her.    Geo Menjivar MD FACOG

## 2019-10-07 NOTE — TELEPHONE ENCOUNTER
Reason for Call:  Other call back    Detailed comments: pt would like to know if she is ok to wait till next appt or should she sched an appt sooner.  Also bp rises when active, normal when resting.  Would you like her on bedrest.  Pt does not want to go to hosp again.  Caller informed that calls received after 3pm may not be returned same day.  Thank you    Phone Number Patient can be reached at: Other phone number:  [phone]    Best Time: any    Can we leave a detailed message on this number? YES    Call taken on 10/7/2019 at 4:10 PM by Judith Restrepo

## 2019-10-08 ENCOUNTER — TELEPHONE (OUTPATIENT)
Dept: OBGYN | Facility: CLINIC | Age: 24
End: 2019-10-08

## 2019-10-08 LAB
GP B STREP DNA SPEC QL NAA+PROBE: NEGATIVE
SPECIMEN SOURCE: NORMAL

## 2019-10-08 NOTE — TELEPHONE ENCOUNTER
Writer read pt Dr. Arce's note. Writer also informed pt that the  is not in clinic and apologized for the delay in getting her external cephalic version scheduled. Pt verbalized understanding and had no further questions or concerns.   Pt wanting to see Dr. Menjviar this week. Writer scheduled pt to see Dr. Menjivar tomorrow at 2pm in .     Annabelle Gary RN on 10/8/2019 at 5:04 PM

## 2019-10-08 NOTE — TELEPHONE ENCOUNTER
[facility] Call [facility]    Phone Message    May a detailed message be left on voicemail: yes    Reason for Call: Other: patient called back and would like another call back. Patient said she has high anxiety waiting to see if she needs to move up appointment or rest more. Please call to advise.      Action Taken: Message routed to:  [facility] p [phone]

## 2019-10-08 NOTE — TELEPHONE ENCOUNTER
Patient returned call. Sent [employer] message, response take message.   Patient asked that a detailed message is left. Please advise

## 2019-10-08 NOTE — TELEPHONE ENCOUNTER
Unable to reach patient via phone. Left message to call clinic back at [phone] and ask for [facility].    Geo Menjivar MD             5:04 PM   Note      Patient needs to have an external cephalic version scheduled either this week or next week.  I have very limited openings, so we should look to see if one of the other partners (who does versions) could do it.  Let me know if you have questions on scheduling this.  Geo Menjivar MD FACOG           Writer doesn't know how to schedule cephalic version.     Annabelle Gary RN on 10/8/2019 at 2:52 PM

## 2019-10-08 NOTE — TELEPHONE ENCOUNTER
Left message on machine to call back  Sara Nolen, [facility]  October 8, 2019 9:21 AM

## 2019-10-09 ENCOUNTER — PRENATAL OFFICE VISIT (OUTPATIENT)
Dept: OBGYN | Facility: CLINIC | Age: 24
End: 2019-10-09
Payer: COMMERCIAL

## 2019-10-09 VITALS
OXYGEN SATURATION: 98 % | HEART RATE: 102 BPM | WEIGHT: 200 LBS | SYSTOLIC BLOOD PRESSURE: 134 MMHG | BODY MASS INDEX: 34.6 KG/M2 | DIASTOLIC BLOOD PRESSURE: 85 MMHG

## 2019-10-09 DIAGNOSIS — Z34.80 SUPERVISION OF OTHER NORMAL PREGNANCY, ANTEPARTUM: Primary | ICD-10-CM

## 2019-10-09 PROCEDURE — 99207 ZZC PRENATAL VISIT: CPT | Performed by: OBSTETRICS & GYNECOLOGY

## 2019-10-09 NOTE — LETTER
2019    RE: Maria Del Rosario Barger,  1995    To Whom It May Concern:    Maria Del Rosario Barger is under our care and was seen at our office on 2019 for a medical appointment.  I hope this information is sufficient for your needs.  If you have any additional questions regarding this matter please contact our office.  Thank you.      Sincerely,        Dr. Geo Menjivar

## 2019-10-09 NOTE — PROGRESS NOTES
Patient presents for routine prenatal visit at 36w5d.  Patient without complaint. Reviewed labs from the hospital.  No signs of pre-eclampsia  PE: See OB vitals  Body mass index is 34.6 kg/m .  Doing well.  No concerns today.  No vaginal bleeding, LOF, contractions.  No HA, RUQ pain, N/V, visual changes.  Discussed the version 10/11.  Questions asked and answered and she agrees to proceed.  Questions asked and answered.      Geo Menjivar MD FACOG

## 2019-10-09 NOTE — TELEPHONE ENCOUNTER
Patient returned my call.  I informed patient that I have scheduled the Version for this Friday with Dr. Cyr.  Patient is to arrive at the hospital at 10:30 and the version is scheduled for 11:00.  Patient verbalized understanding.  Sara Nolen CMA  October 9, 2019 12:55 PM

## 2019-10-17 ENCOUNTER — PRENATAL OFFICE VISIT (OUTPATIENT)
Dept: OBGYN | Facility: CLINIC | Age: 24
End: 2019-10-17
Payer: COMMERCIAL

## 2019-10-17 VITALS
SYSTOLIC BLOOD PRESSURE: 121 MMHG | DIASTOLIC BLOOD PRESSURE: 82 MMHG | BODY MASS INDEX: 34.6 KG/M2 | WEIGHT: 200 LBS | HEART RATE: 93 BPM

## 2019-10-17 DIAGNOSIS — Z34.80 SUPERVISION OF OTHER NORMAL PREGNANCY, ANTEPARTUM: Primary | ICD-10-CM

## 2019-10-17 PROCEDURE — 99207 ZZC PRENATAL VISIT: CPT | Performed by: OBSTETRICS & GYNECOLOGY

## 2019-10-17 NOTE — PROGRESS NOTES
Patient presents for routine prenatal visit at 37w6d.  Patient without complaint.   PE: See OB vitals  Body mass index is 34.6 kg/m .  Doing well.  No concerns today.  No vaginal bleeding, LOF, contractions.  No HA, RUQ pain, N/V, visual changes.  Questions asked and answered.      Geo Menjivar MD FACOG

## 2019-10-17 NOTE — NURSING NOTE
"Chief Complaint   Patient presents with     Prenatal Care     37w6d       Initial /82 (BP Location: Right arm, Cuff Size: Adult Large)   Pulse 93   Wt 90.7 kg (200 lb)   LMP 2019 (Approximate)   BMI 34.60 kg/m   Estimated body mass index is 34.6 kg/m  as calculated from the following:    Height as of 19: 1.619 m (5' 3.75\").    Weight as of this encounter: 90.7 kg (200 lb).  BP completed using cuff size: regular        PHQ-9 score:    PHQ-9 SCORE 8/10/2016   PHQ-9 Total Score 8       Sara Garner MA on 10/17/2019 at 8:43 AM      "

## 2019-10-20 ENCOUNTER — NURSE TRIAGE (OUTPATIENT)
Dept: NURSING | Facility: CLINIC | Age: 24
End: 2019-10-20

## 2019-10-21 NOTE — TELEPHONE ENCOUNTER
"38w2d , . OB provider: Dr Menjivar. Pt reports 30 min ago she was lying down and she had sudden onset of RUQ pain which has been constant since then. Pt denies injury; cannot identify any precipitating factors. She rates pain 3-4 out of 10 severity at this time but sounds quite uncomfortable.  No lower abd pain or contractions. No vaginal bleeding or fluid leakage. No vomiting. No dizziness or weakness. Advised pt talk w/ on-call OB now. Paged on-call Dr Menjivar @9:03pm to call pt at [phone]. Advised cb if no call in 20 min. Advised if worse go in immediately. Pt voiced understanding and agreement.      Reason for Disposition    MODERATE-SEVERE abdominal pain (e.g., interferes with normal activities, awakens from sleep)    Additional Information    Negative: Passed out (i.e., lost consciousness, collapsed and was not responding)    Negative: Shock suspected (e.g., cold/pale/clammy skin, too weak to stand, low BP, rapid pulse)    Negative: Difficult to awaken or acting confused (e.g., disoriented, slurred speech)    Negative: [1] SEVERE abdominal pain (e.g., excruciating) AND [2] constant AND [3] present > 1 hour    Negative: SEVERE vaginal bleeding (e.g., continuous red blood from vagina, or large blood clots)    Negative: Sounds like a life-threatening emergency to the triager    Negative: Followed an abdomen (stomach) injury    Negative: [1] Having contractions or other symptoms of labor (such as vaginal pressure) AND [2] < 37 weeks pregnant (i.e., )    Negative: [1] Having contractions or other symptoms of labor (such as vaginal pressure) AND [2] >= 37 weeks pregnant (i.e., term pregnancy)    Negative: [1] Abdominal pain AND [2] pregnant < 20 weeks    Negative: [1] Vomiting AND [2] contains red blood or black (\"coffee ground\") material  (Exception: few red streaks in vomit that only happened once)    Protocols used: PREGNANCY - ABDOMINAL PAIN GREATER THAN 20 WEEKS EGA-A-AH      "

## 2019-10-24 ENCOUNTER — PRENATAL OFFICE VISIT (OUTPATIENT)
Dept: OBGYN | Facility: CLINIC | Age: 24
End: 2019-10-24
Payer: COMMERCIAL

## 2019-10-24 VITALS
OXYGEN SATURATION: 95 % | HEART RATE: 98 BPM | DIASTOLIC BLOOD PRESSURE: 84 MMHG | WEIGHT: 201.4 LBS | SYSTOLIC BLOOD PRESSURE: 136 MMHG | TEMPERATURE: 98.1 F | HEIGHT: 64 IN | BODY MASS INDEX: 34.38 KG/M2

## 2019-10-24 DIAGNOSIS — Z34.80 SUPERVISION OF OTHER NORMAL PREGNANCY, ANTEPARTUM: Primary | ICD-10-CM

## 2019-10-24 PROCEDURE — 99207 ZZC PRENATAL VISIT: CPT | Performed by: NURSE PRACTITIONER

## 2019-10-24 ASSESSMENT — MIFFLIN-ST. JEOR: SCORE: 1644.57

## 2019-10-24 ASSESSMENT — PAIN SCALES - GENERAL: PAINLEVEL: MODERATE PAIN (5)

## 2019-10-24 NOTE — PROGRESS NOTES
Patient presents for routine prenatal visit. Prenatal flowsheet reviewed and updated as needed.  Denies vaginal bleeding, loss of fluid, contractions or cramping. Denies HA, N/V, RUQ pain and vision changes.   Seen in L&D Sunday for elevated blood pressure and RUQ pain. Np there normal, labs normal. RUQ pain resolved the next day. We again reviewed symptoms of pre-eclampsia to monitor for and report.  Vertex by  [facility] today.  Advice as per Anticipatory Guidance/Checklist updated.  PE: See OB vitals    Questions asked and answered. Next OB visit in 1 week(s) with Dr. Menjivar.    Lydia LYNNE CNP

## 2019-10-31 ENCOUNTER — TELEPHONE (OUTPATIENT)
Dept: OBGYN | Facility: CLINIC | Age: 24
End: 2019-10-31

## 2019-10-31 ENCOUNTER — PRENATAL OFFICE VISIT (OUTPATIENT)
Dept: OBGYN | Facility: CLINIC | Age: 24
End: 2019-10-31
Payer: COMMERCIAL

## 2019-10-31 VITALS
SYSTOLIC BLOOD PRESSURE: 132 MMHG | HEART RATE: 100 BPM | BODY MASS INDEX: 35.05 KG/M2 | WEIGHT: 202.6 LBS | DIASTOLIC BLOOD PRESSURE: 87 MMHG

## 2019-10-31 DIAGNOSIS — F41.1 GAD (GENERALIZED ANXIETY DISORDER): ICD-10-CM

## 2019-10-31 DIAGNOSIS — Z34.80 SUPERVISION OF OTHER NORMAL PREGNANCY, ANTEPARTUM: Primary | ICD-10-CM

## 2019-10-31 PROCEDURE — 99207 ZZC PRENATAL VISIT: CPT | Performed by: OBSTETRICS & GYNECOLOGY

## 2019-10-31 NOTE — TELEPHONE ENCOUNTER
I scheduled patient for cervical ripening on 11/7/19 and Induction on 11/8/19.  Patient is to call L&D at [phone] at 5 pm.  At this time patient is to be at the hospital by 6 pm.  Informed patient that when she calls at 5 the hospital will let her know if 6pm is still a good time to arrive.  Patient verbalized understanding and Induction of labor scheduling request form faxed to the hospital.  Sara Nolen CMA  October 31, 2019 2:52 PM

## 2019-10-31 NOTE — PROGRESS NOTES
Maria Del Rosario presents today with concerns.  She reports having episodes feeling shortness of breath.  These are unrelated to activilty.  She feels anxious, but it isn't clear which comes first.  She is asymptomatic at this time..    ROS: 10 point review of systems is negative except for above.  Past Medical, surgical and Obstetric history is recorded in EPIC and reviewed.    /87 (BP Location: Right arm, Cuff Size: Adult Large)   Pulse 100   Wt 91.9 kg (202 lb 9.6 oz)   LMP 01/25/2019 (Approximate)   BMI 35.05 kg/m      PE:Lungs:  Clear, no rales or rhonchi  CV:  RRR  Abdomen:  Soft, non-tender, no fundal tenderness      OB vitals noted.  Discussed with Maria Del Rosario   the risks, benefits and alternatives to induction.  We discussed cervical ripening for those patients with a villa score of less than 6 (for multiparous) and 8 (for nulliparous).  Discussed the concerns related to uterine hyperstimulation and adverse fetal effects that can occur with eith a ripening agent or pitocin. Discussed amniotomy and the rationale for it with induction.  I discussed the expected timeline for her based on her presentation, but she understands that this is just an approximate.  She is given the opportunity to ask questions and have them answered.  She does wish to proceed  ESTIMATED FETAL WEIGHT:  AGA    Induction will be scheduled for 11/8  I have instructed her to come to LABOR AND DELIVER if she has any more of these episodes.  It may be that an exam during one may help it determining the etiology.    A/P: .IUP 39w6d.  She will follow up Monday and we can decide if she still needs ripening.

## 2019-10-31 NOTE — NURSING NOTE
"Chief Complaint   Patient presents with     Prenatal Care     39w6d       Initial /87 (BP Location: Right arm, Cuff Size: Adult Regular)   Pulse 100   Wt 91.9 kg (202 lb 9.6 oz)   LMP 2019 (Approximate)   BMI 35.05 kg/m   Estimated body mass index is 35.05 kg/m  as calculated from the following:    Height as of 10/24/19: 1.619 m (5' 3.75\").    Weight as of this encounter: 91.9 kg (202 lb 9.6 oz).  BP completed using cuff size: large        PHQ-9 score:    PHQ-9 SCORE 8/10/2016   PHQ-9 Total Score 8       Sara Garner MA on 10/31/2019 at 11:42 AM      "

## 2019-11-04 ENCOUNTER — PRENATAL OFFICE VISIT (OUTPATIENT)
Dept: OBGYN | Facility: CLINIC | Age: 24
End: 2019-11-04
Payer: COMMERCIAL

## 2019-11-04 VITALS
OXYGEN SATURATION: 97 % | HEIGHT: 64 IN | WEIGHT: 203.2 LBS | BODY MASS INDEX: 34.69 KG/M2 | DIASTOLIC BLOOD PRESSURE: 83 MMHG | TEMPERATURE: 97.9 F | HEART RATE: 97 BPM | SYSTOLIC BLOOD PRESSURE: 123 MMHG

## 2019-11-04 DIAGNOSIS — O48.0 POST-TERM PREGNANCY, 40-42 WEEKS OF GESTATION: Primary | ICD-10-CM

## 2019-11-04 PROCEDURE — 99207 ZZC PRENATAL VISIT: CPT | Performed by: NURSE PRACTITIONER

## 2019-11-04 ASSESSMENT — MIFFLIN-ST. JEOR: SCORE: 1652.74

## 2019-11-04 ASSESSMENT — PAIN SCALES - GENERAL: PAINLEVEL: NO PAIN (0)

## 2019-11-04 NOTE — PROGRESS NOTES
Patient presents for routine prenatal visit. Prenatal flowsheet reviewed and updated as needed.  Denies vaginal bleeding, loss of fluid, contractions or cramping. Denies HA, N/V, RUQ pain and vision changes.  Patient without complaint. SOB less the last few days.  Induction: cervical ripening already scheduled 11/7/19.  Reviewed signs and symptoms of labor and when to proceed to L&D.  Advice as per Anticipatory Guidance/Checklist updated.  PE: See OB vitals    Questions asked and answered.    Lydia LYNNE CNP

## 2019-11-08 ENCOUNTER — HEALTH MAINTENANCE LETTER (OUTPATIENT)
Age: 24
End: 2019-11-08

## 2019-11-12 ENCOUNTER — DOCUMENTATION ONLY (OUTPATIENT)
Dept: PEDIATRICS | Facility: CLINIC | Age: 24
End: 2019-11-12

## 2019-11-12 ENCOUNTER — TELEPHONE (OUTPATIENT)
Dept: FAMILY MEDICINE | Facility: CLINIC | Age: 24
End: 2019-11-12

## 2019-11-12 ENCOUNTER — TRANSFERRED RECORDS (OUTPATIENT)
Dept: HEALTH INFORMATION MANAGEMENT | Facility: CLINIC | Age: 24
End: 2019-11-12

## 2019-11-12 NOTE — TELEPHONE ENCOUNTER
Patient had vaginal delivery on Nov 8.  Has a fever of 100.2 and has had a headache all day.  Not feeling well.  Her bp at home 167/96.  She states her breasts are also severely engorged.  She states did have elevated bp concerns just prior to delivery. Was never told that she had pre-eclampsia. Did not have gestational diabetes. She states her feet/ankles were really swollen yesterday.  Hasn't checked them today.  Vaginal delivery; does have a few stitches due to episiotomy.  Still having vaginal bleeding; reports is not heavy.  Patient does have someone with her that can take her to the [facility] ED for assessment of her symptoms.  She states will go now.  Needs to be seen for assessment now.  Amy Rhodes RN

## 2019-11-12 NOTE — PROGRESS NOTES
EPDS was done in clinic today during child's  visit visit and score was 12 with negative psychosis and negative screen for suicidal ideation.   Mom has history of postpartum depression with previous pregnancy.   Copy of EPDS was given to Nabeel today as well as educational material about postpartum depression.   Was able to manage it on her own and was not treated.    Plan:   Follow up within 1-2 weeks with OB/primary care provider was recommended.  Behavioral health referral was not placed.     Jesica Clayton, APRN, CNP

## 2019-11-12 NOTE — TELEPHONE ENCOUNTER
RN called patient to verify she is following recommendations. Patient verbalized she is, waiting for mom to bring her in to ED but will be going in as soon as she gets there.     Shaye Durand RN on 11/12/2019 at 5:00 PM

## 2019-12-09 ENCOUNTER — DOCUMENTATION ONLY (OUTPATIENT)
Dept: PEDIATRICS | Facility: CLINIC | Age: 24
End: 2019-12-09

## 2019-12-09 NOTE — PROGRESS NOTES
EPDS was done in clinic today during child's 1 month visit and score was 14 with negative psychosis and negative screen for suicidal ideation.   Mom had post partum depression with her first child and more anxiety now.   Copy of EPDS was given to mom today as well as educational material about postpartum depression.     Mom is back to work next week.  Just moved and need the money.  Dad is not a good helper as he works MOnday through Friday.  He works long hours.     Mom had post partum depression with her fisrt baby and is really stressed  She is seeing Lydia Lucio NP tomorrow.      Plan:   Follow up within 1-2 weeks with OB/primary care provider was recommended.  She has visit tomorrow.  Behavioral health referral was not placed.     Will forward to Lydia Lucio NP.    MAGED Eddy, CNP

## 2019-12-10 ENCOUNTER — PRENATAL OFFICE VISIT (OUTPATIENT)
Dept: OBGYN | Facility: CLINIC | Age: 24
End: 2019-12-10
Payer: COMMERCIAL

## 2019-12-10 VITALS
BODY MASS INDEX: 31.31 KG/M2 | WEIGHT: 181 LBS | SYSTOLIC BLOOD PRESSURE: 119 MMHG | DIASTOLIC BLOOD PRESSURE: 77 MMHG | HEART RATE: 76 BPM | TEMPERATURE: 97.9 F

## 2019-12-10 DIAGNOSIS — Z30.011 ENCOUNTER FOR INITIAL PRESCRIPTION OF CONTRACEPTIVE PILLS: ICD-10-CM

## 2019-12-10 DIAGNOSIS — F41.1 GENERALIZED ANXIETY DISORDER: ICD-10-CM

## 2019-12-10 PROBLEM — Z34.80 SUPERVISION OF OTHER NORMAL PREGNANCY, ANTEPARTUM: Status: RESOLVED | Noted: 2019-03-26 | Resolved: 2019-12-10

## 2019-12-10 PROCEDURE — 99213 OFFICE O/P EST LOW 20 MIN: CPT | Mod: 25 | Performed by: NURSE PRACTITIONER

## 2019-12-10 PROCEDURE — 99207 ZZC POST PARTUM EXAM: CPT | Performed by: NURSE PRACTITIONER

## 2019-12-10 RX ORDER — LEVONORGESTREL AND ETHINYL ESTRADIOL 0.15-0.03
1 KIT ORAL DAILY
Qty: 84 TABLET | Refills: 3 | Status: SHIPPED | OUTPATIENT
Start: 2019-12-10 | End: 2021-02-12

## 2019-12-10 RX ORDER — ESCITALOPRAM OXALATE 10 MG/1
TABLET ORAL
Qty: 30 TABLET | Refills: 1 | Status: SHIPPED | OUTPATIENT
Start: 2019-12-10 | End: 2021-12-02

## 2019-12-10 ASSESSMENT — PATIENT HEALTH QUESTIONNAIRE - PHQ9
5. POOR APPETITE OR OVEREATING: NEARLY EVERY DAY
SUM OF ALL RESPONSES TO PHQ QUESTIONS 1-9: 5
10. IF YOU CHECKED OFF ANY PROBLEMS, HOW DIFFICULT HAVE THESE PROBLEMS MADE IT FOR YOU TO DO YOUR WORK, TAKE CARE OF THINGS AT HOME, OR GET ALONG WITH OTHER PEOPLE: SOMEWHAT DIFFICULT
SUM OF ALL RESPONSES TO PHQ QUESTIONS 1-9: 5

## 2019-12-10 ASSESSMENT — ANXIETY QUESTIONNAIRES
1. FEELING NERVOUS, ANXIOUS, OR ON EDGE: MORE THAN HALF THE DAYS
6. BECOMING EASILY ANNOYED OR IRRITABLE: SEVERAL DAYS
IF YOU CHECKED OFF ANY PROBLEMS ON THIS QUESTIONNAIRE, HOW DIFFICULT HAVE THESE PROBLEMS MADE IT FOR YOU TO DO YOUR WORK, TAKE CARE OF THINGS AT HOME, OR GET ALONG WITH OTHER PEOPLE: VERY DIFFICULT
7. FEELING AFRAID AS IF SOMETHING AWFUL MIGHT HAPPEN: MORE THAN HALF THE DAYS
5. BEING SO RESTLESS THAT IT IS HARD TO SIT STILL: MORE THAN HALF THE DAYS
3. WORRYING TOO MUCH ABOUT DIFFERENT THINGS: NEARLY EVERY DAY
2. NOT BEING ABLE TO STOP OR CONTROL WORRYING: MORE THAN HALF THE DAYS
GAD7 TOTAL SCORE: 15

## 2019-12-10 NOTE — LETTER
[facility]  [address] [place] [address]  [phone]    RE: Maria Del Rosario Barger  : 1995    DATE: December 10, 2019      To Whom It May Concern,        Maria Del Rosario Barger was seen in the clinic today. She is able to return to work without restriction on 19.      Thank you.      Sincerely,            Lydia LYNNE CNP

## 2019-12-10 NOTE — PROGRESS NOTES
Maria Del Rosario is here for a postpartum checkup.    She had a   OB History    Para Term  AB Living   3 2 2 0 1 2   SAB TAB Ectopic Multiple Live Births   1 0 0 0 2      # Outcome Date GA Lbr [place]/2nd Weight Sex Delivery Anes PTL Lv   3 Term 19 41w0d  3.232 kg (7 lb 2 oz) M    JERAMY   2 Term 16 41w1d  3.402 kg (7 lb 8 oz) M  EPI N JERAMY      Name: Mars      Apgar1: 7  Apgar5: 9   1 SAB 14     SAB        Since delivery, she has discontinued breast feeding due to mastitis.  She has not had a normal menses.  She has had intercourse.  Patient screened for postpartum depression and complaints are positive for positive for sleep disturbance. Screening has also been completed for intimate partner violence. She would like to discuss anxiety. Patient going back to work next week, 1 week earlier than planned originally. Has a nanny coming to the house to watch the baby, but is feeling anxiety over this and also over her decision to stop breastfeeding. Has an exaggerated sense of worry, restlessness. Feels she is managing, but could be better. Denies any concern of depression, mostly anxiety. Also wants to discuss contraception.    O: This is a well appearing female in no acute distress. Answers questions and maintains eye contact appropriately. Vital signs noted.  RESPIRATORY: Clear to auscultation bilaterally.  CV: Regular rate and rhythm without murmur, gallop, rub  ABDOMEN: Soft, nontender, nondistended, normoactive bowel sounds. No hepatosplenomegaly. No guarding, rebounding, or rigidity.  Vulva: No external lesions, normal hair distribution, no adenopathy  BUS:  Normal, no masses noted  Vagina: Moist, pink, no abnormal discharge, well rugated, no lesions  Cervix: Pink, parous, midline. Without cervical motion tenderness.  Uterus: Normal size and shape, non-tender, mobile  Ovaries: No masses, non-tender, mobile  GAD7 score: 15  PHQ-9 score: 5    A/P:     1) Routine Postpartum-   - I  discussed the new pap recommendations regarding screening.  Explained the rationale for increased intervals between paps.  Questions asked and answered.  She does agree to this regiment.   - Pap was not performed   - Contraception: Discussed options for contraception with patient including oral contraceptive pills, transdermal patches, vaginal ring, Depo Provera, Nexplanon. At this time she would like to try an oral contraceptive pill. We discussed when to start the pill, taking it at the same time every day, possible side effects she may experience, and use of barrier method to protect against STDs. Prescription sent.    2) Generalized Anxiety-  We reviewed her PHQ-9 and KEISHA questionnaires. Patient is anxious about going back to work, but is also not getting much support from her SO as he is working more hours. We discussed options and she would like to try medication. We discussed the medication can take time for full effectiveness to be noticeable. Discussed taking it regularly, possible side effects. Patient to send message via [employer] in 2 weeks, but certainly sooner if symptoms worsening or unmanageable. Patient is given an opportunity to ask questions and have them answered.    Lydia LYNNE CNP      Answers for HPI/ROS submitted by the patient on 12/10/2019   If you checked off any problems, how difficult have these problems made it for you to do your work, take care of things at home, or get along with other people?: Somewhat difficult  PHQ9 TOTAL SCORE: 5

## 2019-12-11 ASSESSMENT — ANXIETY QUESTIONNAIRES: GAD7 TOTAL SCORE: 15

## 2019-12-11 ASSESSMENT — PATIENT HEALTH QUESTIONNAIRE - PHQ9: SUM OF ALL RESPONSES TO PHQ QUESTIONS 1-9: 5

## 2020-01-13 ENCOUNTER — DOCUMENTATION ONLY (OUTPATIENT)
Dept: PEDIATRICS | Facility: CLINIC | Age: 25
End: 2020-01-13

## 2020-01-13 NOTE — PROGRESS NOTES
EPDS was done in clinic today during child's 2 month visit and score was 13 with negative psychosis and negative screen for suicidal ideation.   Mom has history of anxiety and was placed on  medication starting with an L and reports it made her 100% worse so stopped taking after 3 days.  she has not been able to schedule a follow up with Lydia Lucio NP..   Copy of EPDS was given to Nabeel today as well as educational material about postpartum depression.     Plan:   Follow up within 1-2 weeks with OB/primary care provider was recommended.  Behavioral health referral was not placed.     Jesica Clayton, APRN, CNP

## 2020-01-13 NOTE — PROGRESS NOTES
Unable to reach patient via phone. Left message to call clinic back at [phone] and ask for Women's Health.    Shaye Durand RN on 1/13/2020 at 11:41 AM

## 2020-01-13 NOTE — PROGRESS NOTES
Noted. Patient was started on Lexapro for anxiety at her postpartum exam. Can we please reach out to patient to see if she would like to be seen to discuss alternate options? Would recommend follow up with FP provider as medication we tried was not beneficial. Lydia LYNNE CNP

## 2020-03-02 ENCOUNTER — MEDICAL CORRESPONDENCE (OUTPATIENT)
Dept: HEALTH INFORMATION MANAGEMENT | Facility: CLINIC | Age: 25
End: 2020-03-02

## 2020-03-03 ENCOUNTER — DOCUMENTATION ONLY (OUTPATIENT)
Dept: PEDIATRICS | Facility: CLINIC | Age: 25
End: 2020-03-03

## 2020-03-03 NOTE — PROGRESS NOTES
Patient would like to follow up with Lydia, and said she would schedule an appointment when she looks at her schedule.    Sara Garner MA on 3/3/2020 at 4:48 PM

## 2020-03-03 NOTE — PROGRESS NOTES
EPDS was done in clinic today during child's 4 month visit and score was 13 with negative psychosis and negative screen for suicidal ideation.   Mom has history of depression treated with Lexapro and took for a week but couldn's sleep and this intensified every thing so mom stopped.  mom ha also anxiety and PTSD.  Mom has been so busy with the 2 kids, work and trying to keep up the house she has not been back.  She talks with a counselor weekly. It is affecting her but so busy cannot take the time to get in.   Copy of EPDS was given to mom today as well as educational material about postpartum depression.     Plan:   Follow up within 3-4 weeks with OB/primary care provider was recommended in person or evisit..  Behavioral health referral was not placed.    MAGED Eddy, CNP

## 2020-06-13 NOTE — PROGRESS NOTES
Called family to notify prescription has been signed and sent to [facility] in [place].   I am always happy to see patient for follow up if she desires, can also do E-visit. Is seeing counselor per note. Lydia LYNNE CNP

## 2020-06-17 ENCOUNTER — MEDICAL CORRESPONDENCE (OUTPATIENT)
Dept: HEALTH INFORMATION MANAGEMENT | Facility: CLINIC | Age: 25
End: 2020-06-17

## 2020-06-17 ENCOUNTER — DOCUMENTATION ONLY (OUTPATIENT)
Dept: PEDIATRICS | Facility: CLINIC | Age: 25
End: 2020-06-17

## 2020-06-17 NOTE — PROGRESS NOTES
EPDS was done in clinic today during child's 6 month visit and score was 10 with negative psychosis and negative screen for suicidal ideation.   Mom has history of anxiety.  was medicated in the past and was on lexapro. she lost her job and unemployment is running out.    Copy of EPDS was given to Nabeel today as well as educational material about postpartum depression.     Plan:   Follow up within 1-2 weeks with OB/primary care provider was recommended.  Will forward this to OB.  Behavioral health referral was not placed.       Jesica Clayton, APRN, CNP

## 2020-06-17 NOTE — PROGRESS NOTES
Spoke with patient and assisted her to make an appointment with Lydia in [place] on June 29th  For post partum depression/anxiety.  Kim Collier CMA

## 2020-09-22 ENCOUNTER — OFFICE VISIT (OUTPATIENT)
Dept: OBGYN | Facility: CLINIC | Age: 25
End: 2020-09-22
Payer: COMMERCIAL

## 2020-09-22 VITALS
BODY MASS INDEX: 29.13 KG/M2 | WEIGHT: 168.4 LBS | OXYGEN SATURATION: 99 % | HEART RATE: 83 BPM | DIASTOLIC BLOOD PRESSURE: 84 MMHG | SYSTOLIC BLOOD PRESSURE: 124 MMHG

## 2020-09-22 DIAGNOSIS — N93.8 DUB (DYSFUNCTIONAL UTERINE BLEEDING): ICD-10-CM

## 2020-09-22 DIAGNOSIS — N93.9 VAGINAL BLEEDING: Primary | ICD-10-CM

## 2020-09-22 LAB
ERYTHROCYTE [DISTWIDTH] IN BLOOD BY AUTOMATED COUNT: 12.8 % (ref 10–15)
HCG UR QL: NEGATIVE
HCT VFR BLD AUTO: 39.6 % (ref 35–47)
HGB BLD-MCNC: 13.1 G/DL (ref 11.7–15.7)
MCH RBC QN AUTO: 28.5 PG (ref 26.5–33)
MCHC RBC AUTO-ENTMCNC: 33.1 G/DL (ref 31.5–36.5)
MCV RBC AUTO: 86 FL (ref 78–100)
PLATELET # BLD AUTO: 267 10E9/L (ref 150–450)
RBC # BLD AUTO: 4.6 10E12/L (ref 3.8–5.2)
TSH SERPL DL<=0.005 MIU/L-ACNC: 1.4 MU/L (ref 0.4–4)
WBC # BLD AUTO: 6.9 10E9/L (ref 4–11)

## 2020-09-22 PROCEDURE — 84443 ASSAY THYROID STIM HORMONE: CPT | Performed by: ADVANCED PRACTICE MIDWIFE

## 2020-09-22 PROCEDURE — 36415 COLL VENOUS BLD VENIPUNCTURE: CPT | Performed by: ADVANCED PRACTICE MIDWIFE

## 2020-09-22 PROCEDURE — 99213 OFFICE O/P EST LOW 20 MIN: CPT | Performed by: ADVANCED PRACTICE MIDWIFE

## 2020-09-22 PROCEDURE — 81025 URINE PREGNANCY TEST: CPT | Performed by: ADVANCED PRACTICE MIDWIFE

## 2020-09-22 PROCEDURE — 85027 COMPLETE CBC AUTOMATED: CPT | Performed by: ADVANCED PRACTICE MIDWIFE

## 2020-09-22 NOTE — PROGRESS NOTES
Maria Del Rosario Barger is a 25 year old who presents to the clinic for evaluation of vaginal bleeding on and off for the last 3-4 months.   Had an SAB in May.  Didn't seek care for that.  [place] is was complete at home thought she did bleed for about 6 weeks after.  Had a couple weeks of no bleeding and then started again with breaks of a week or so.       Histories reviewed and updated  Past Medical History:   Diagnosis Date     Atopic rhinitis 2008    Overview:  by history to dogs and cats     Chlamydia      KEISHA (generalized anxiety disorder) 2016     Past Surgical History:   Procedure Laterality Date     COMBINED ESOPHAGOSCOPY, GASTROSCOPY, DUODENOSCOPY (EGD) WITH CO2 INSUFFLATION Bilateral 2016    Procedure: COMBINED ESOPHAGOSCOPY, GASTROSCOPY, DUODENOSCOPY (EGD) WITH CO2 INSUFFLATION;  Surgeon: Warren Fernandez MD;  Location: [facility] OR     ESOPHAGOSCOPY, GASTROSCOPY, DUODENOSCOPY (EGD), COMBINED N/A 2016    Procedure: COMBINED ESOPHAGOSCOPY, GASTROSCOPY, DUODENOSCOPY (EGD), BIOPSY SINGLE OR MULTIPLE;  Surgeon: Warren Fernandez MD;  Location: [facility] OR     LAPAROSCOPIC CHOLECYSTECTOMY       TONSILLECTOMY       trigger thumb Right      Social History     Socioeconomic History     Marital status: Single     Spouse name: partnerMarlene Crews     Number of children: 1     Years of education: Not on file     Highest education level: Not on file   Occupational History     Occupation: /   Social Needs     Financial resource strain: Not on file     Food insecurity     Worry: Not on file     Inability: Not on file     Transportation needs     Medical: Not on file     Non-medical: Not on file   Tobacco Use     Smoking status: Current Some Day Smoker     Last attempt to quit: 2019     Years since quittin.5     Smokeless tobacco: Never Used   Substance and Sexual Activity     Alcohol use: No     Frequency: Never     Drug use: No     Sexual activity: Yes     Partners: Male    Lifestyle     Physical activity     Days per week: Not on file     Minutes per session: Not on file     Stress: Not on file   Relationships     Social connections     Talks on phone: Not on file     Gets together: Not on file     Attends Muslim service: Not on file     Active member of club or organization: Not on file     Attends meetings of clubs or organizations: Not on file     Relationship status: Not on file     Intimate partner violence     Fear of current or ex partner: Not on file     Emotionally abused: Not on file     Physically abused: Not on file     Forced sexual activity: Not on file   Other Topics Concern     Parent/sibling w/ CABG, MI or angioplasty before 65F 55M? Not Asked   Social History Narrative     Not on file     No family history on file.       ROS: 10 point ROS neg other than the symptoms noted above in the HPI.    EXAM:  /84 (BP Location: Right arm, Cuff Size: Adult Regular)   Pulse 83   Wt 76.4 kg (168 lb 6.4 oz)   LMP 09/13/2020 (Exact Date)   SpO2 99%   Breastfeeding No   BMI 29.13 kg/m    GENERAL:  Pleasant female, no acute distress  EYES: sclera clear  RESP: breathing unlabored  CV: extremities without edema  GI: NEGATIVE  M/S: no gross deformities  Neuro:  normal strength and sensation  : PELVIC EXAM:  Vulva: No external lesions, normal hair distribution, no adenopathy, BUS WNL  Vagina: Moist, pink, no abnormal discharge, well rugated, no lesions.  Small amount of dark red blood in the vagina.    Cervix: smooth, pink, no visible lesions, neg CMT cx is closed  Uterus: mildly enlarged? retorverted, non-tender, mobile  Ovaries: No mass, non-tender, mobile  Rectal exam: deferred  Psych:  alert, with normal speech and behavior    CBC and TSH normal today      ASSESSMENT/PLAN:    (N93.9) Vaginal bleeding  (primary encounter diagnosis)  Comment:   Plan: HCG Qual, Urine ([id number]), CBC with platelets,         TSH with free T4 reflex, US Pelvic Complete         with  Transvaginal     No evidence of molar pregnancy       (N93.8) DUB (dysfunctional uterine bleeding)  Comment:   Plan: CBC with platelets, TSH with free T4 reflex, US        Pelvic Complete with Transvaginal                 Has a prescription from her previous OB/GYN for COCs that she has not started. Denies contraindications to pill use.   Will start today.  Anticipate the bleeding to gradually conform to pill pack if it doesn't will call in the 4 th pack for a different. Pill.   Would consider loestrin        Visit length 20 minutes was spent face to face with the patient today discussing her history, diagnosis, and follow-up plan as noted above.  Over 50% of the visit was spent in counseling and coordination of care.    Time noted does not include time required to complete procedures.

## 2020-09-24 ENCOUNTER — ANCILLARY PROCEDURE (OUTPATIENT)
Dept: ULTRASOUND IMAGING | Facility: CLINIC | Age: 25
End: 2020-09-24
Attending: ADVANCED PRACTICE MIDWIFE
Payer: COMMERCIAL

## 2020-09-24 ENCOUNTER — MYC MEDICAL ADVICE (OUTPATIENT)
Dept: OBGYN | Facility: CLINIC | Age: 25
End: 2020-09-24

## 2020-09-24 DIAGNOSIS — N93.9 VAGINAL BLEEDING: ICD-10-CM

## 2020-09-24 DIAGNOSIS — N93.8 DUB (DYSFUNCTIONAL UTERINE BLEEDING): Primary | ICD-10-CM

## 2020-09-24 DIAGNOSIS — N93.8 DUB (DYSFUNCTIONAL UTERINE BLEEDING): ICD-10-CM

## 2020-09-24 PROBLEM — N83.202 CYSTS OF BOTH OVARIES: Status: ACTIVE | Noted: 2020-09-24

## 2020-09-24 PROBLEM — N83.201 CYSTS OF BOTH OVARIES: Status: ACTIVE | Noted: 2020-09-24

## 2020-09-24 PROCEDURE — 76856 US EXAM PELVIC COMPLETE: CPT

## 2020-09-24 PROCEDURE — 76830 TRANSVAGINAL US NON-OB: CPT

## 2020-09-24 NOTE — TELEPHONE ENCOUNTER
"Pt seen today for vaginal bleeding with Baylee LYNNE CNM.    Pt wanting lab work for testosterone levels to help \"confirm or deny\" possible diagnosis of PCOS.    RN routing to provider for advisement on labs.    Lauryn Singh RN on 9/24/2020 at 2:33 PM    "

## 2020-10-26 ENCOUNTER — MYC MEDICAL ADVICE (OUTPATIENT)
Dept: OBGYN | Facility: CLINIC | Age: 25
End: 2020-10-26

## 2020-10-26 ENCOUNTER — NURSE TRIAGE (OUTPATIENT)
Dept: NURSING | Facility: CLINIC | Age: 25
End: 2020-10-26

## 2020-10-26 ENCOUNTER — VIRTUAL VISIT (OUTPATIENT)
Dept: FAMILY MEDICINE | Facility: OTHER | Age: 25
End: 2020-10-26
Payer: COMMERCIAL

## 2020-10-26 PROCEDURE — 99421 OL DIG E/M SVC 5-10 MIN: CPT | Performed by: PHYSICIAN ASSISTANT

## 2020-10-26 NOTE — TELEPHONE ENCOUNTER
Pt last seen 9/22/2020 for vaginal bleeding, DUB.    Pt states she had exposure to COVID 19 on 10/18/2020 and is quarantining for 14 days. Pt only having runny nose.    Pt advised [email or web address], pt states she cannot afford it.    Pt asking for a work note stating she needs to quarantine from 10/[id number]/1/2020. RN routing to provider for advisement.    Lauryn Singh RN on 10/26/2020 at 8:24 AM

## 2020-10-26 NOTE — TELEPHONE ENCOUNTER
At a gathering with parents and grandparents. Grandfather tested positive and father is feeling sick     Patient has a runny nose that started after the visit.   -patient just thinks that it is allergies.     No other symptoms.     Wondering if she should be tested and also needs a note for work.     Advised [email or web address]. Patient is agreeable.     COVID 19 Nurse Triage Plan/Patient Instructions    Please be aware that novel coronavirus (COVID-19) may be circulating in the community. If you develop symptoms such as fever, cough, or SOB or if you have concerns about the presence of another infection including coronavirus (COVID-19), please contact your health care provider or visit [email or web address].     Disposition/Instructions    Virtual Visit with provider recommended. Reference Visit Selection Guide.    Thank you for taking steps to prevent the spread of this virus.  o Limit your contact with others.  o Wear a simple mask to cover your cough.  o Wash your hands well and often.    Resources    [facility]: About COVID-19: [email or web address]    CDC: What to Do If You're Sick: www.cdc.gov/coronavirus/2019-ncov/about/steps-when-sick.html    CDC: Ending Home Isolation: www.cdc.gov/coronavirus/2019-ncov/hcp/disposition-in-home-patients.html     CDC: Caring for Someone: www.cdc.gov/coronavirus/2019-ncov/if-you-are-sick/care-for-someone.html     [facility]: Interim Guidance for Hospital Discharge to Home: www.health.[facility].[place]/diseases/coronavirus/hcp/hospdischarge.pdf    [facility] clinical trials (COVID-19 research studies): clinicalaffairs.[facility]/umn-clinical-trials     Below are the COVID-19 hotlines at the [facility] of Health ([facility]). Interpreters are available.   o For health questions: Call 092-433-5897 or 1-195.567.8237 (7 a.m. to 7 p.m.)  o For questions about schools and childcare: Call 977-959-3434 or 1-829.696.4070 (7 a.m. to 7 p.m.)     Reason for Disposition    [1] COVID-19  EXPOSURE (Close Contact) within last 14 days AND [2] needs COVID-19 lab test to return to work AND [3] NO symptoms    Additional Information    Negative: COVID-19 has been diagnosed by a healthcare provider (HCP)    Negative: COVID-19 lab test positive    Negative: [1] Symptoms of COVID-19 (e.g., cough, fever, SOB, or others) AND [2] lives in an area with community spread    Negative: [1] Symptoms of COVID-19 (e.g., cough, fever, SOB, or others) AND [2] within 14 days of EXPOSURE (close contact) with diagnosed or suspected COVID-19 patient    Negative: [1] Symptoms of COVID-19 (e.g., cough, fever, SOB, or others) AND [2] within 14 days of travel from high-risk area for COVID-19 community spread (identified by CDC)    Negative: [1] Difficulty breathing (shortness of breath) occurs AND [2] onset > 14 days after COVID-19 EXPOSURE (Close Contact) AND [3] no community spread where patient lives    Negative: [1] Dry cough occurs AND [2] onset > 14 days after COVID-19 EXPOSURE AND [3] no community spread where patient lives    Negative: [1] Wet cough (i.e., white-yellow, yellow, green, or avinash colored sputum) AND [2] onset > 14 days after COVID-19 EXPOSURE AND [3] no community spread where patient lives    Negative: [1] Common cold symptoms AND [2] onset > 14 days after COVID-19 EXPOSURE AND [3] no community spread where patient lives    Negative: [1] COVID-19 EXPOSURE (Close Contact) within last 14 days AND [2] exposed person is a healthcare worker who was NOT using all recommended personal protective equipment (i.e., a respirator-N95 mask, eye protection, gloves, and gown) AND [3] NO symptoms    Protocols used: CORONAVIRUS (COVID-19) EXPOSURE-[facility] 8.4.20    Jaylin De Guzman RN on 10/26/2020 at 7:21 AM

## 2020-10-26 NOTE — PROGRESS NOTES
"Date: 10/26/2020 15:28:41  Clinician: Christo Szymanski  Clinician NPI: [id number]  Patient: Maria Del Rosario Barger  Patient : 1995  Patient Address: [address] [place] [address] [place] [address]  Patient Phone: [phone]  Visit Protocol: URI  Patient Summary:  Maria Del oRsario is a 25 year old ( : 1995 ) female who initiated a [facility] Visit for COVID-19 (Coronavirus) evaluation and screening. When asked the question \"Please sign me up to receive news, health information and promotions from [facility].\", Maria Del Rosario responded \"No\".    Maria Del Rosario states her symptoms started today.   Her symptoms consist of a headache, a cough, rhinitis, facial pain or pressure, myalgia, malaise, and a sore throat. Maria Del Rosario also feels feverish.   Symptom details     Nasal secretions: The color of her mucus is clear.    Cough: Maria Del Rosario coughs a few times an hour and her cough is not more bothersome at night. Phlegm does not come into her throat when she coughs. She does not believe her cough is caused by post-nasal drip.     Sore throat: Maria Del Rosario reports having mild throat pain (1-3 on a 10 point pain scale), does not have exudate on her tonsils, and can swallow liquids. She is not sure if the lymph nodes in her neck are enlarged. A rash has not appeared on the skin since the sore throat started.     Temperature: Her current temperature is 99.7 degrees Fahrenheit.     Facial pain or pressure: The facial pain or pressure does not feel worse when bending or leaning forward.     Headache: She states the headache is mild (1-3 on a 10 point pain scale).      Maria Del Rosario denies having ear pain, wheezing, nasal congestion, anosmia, vomiting, nausea, chills, teeth pain, ageusia, and diarrhea. She also denies having a sinus infection within the past year, taking antibiotic medication in the past month, and having recent facial or sinus surgery in the past 60 days. She is not experiencing dyspnea.   Precipitating events  Within the past week, Maria Del Rosario has not been exposed to " someone with strep throat. She has not recently been exposed to someone with influenza. Maria Del Rosario has been in close contact with the following high risk individuals: people with asthma, heart disease or diabetes and children under the age of 5.   Pertinent COVID-19 (Coronavirus) information  In the past 14 days, Maria Del Rosario has not worked in a congregate living setting.   She does not work or volunteer as healthcare worker or a  and does not work or volunteer in a healthcare facility.   Maria Del Rosario also has not lived in a congregate living setting in the past 14 days. She does not live with a healthcare worker.   Maria Del Rosario has had a close contact with a laboratory-confirmed COVID-19 patient within 14 days of symptom onset. Additional information about contact with COVID-19 (Coronavirus) patient as reported by the patient (free text): 10/18/20 had contact with grandpa grandma dad and mom 10/20/20 grandpa has symptoms 10/22/20 grandma has symptoms 10/24/20 grandpas test comes back positive for covid 19. My dad now has symptoms and I was just with him yesterday 10/25/20. Now I have low grade fever, slight cough, out of breath when going up stairs, achy muscles and just tired with slight soar throat.   Since December 2019, Maria Del Rosario and has not had upper respiratory infection or influenza-like illness. Has not been diagnosed with lab-confirmed COVID-19 test   Pertinent medical history  Maria Del Rosario does not get yeast infections when she takes antibiotics.   Maria Del Rosario needs a return to work/school note.   Weight: 165 lbs   Maria Del Rosario does not smoke or use smokeless tobacco.   She denies pregnancy and denies breastfeeding. She has menstruated in the past month.   Weight: 165 lbs  Reason for repeat visit for the same protocol within 24 hours:  Because I now I have symptoms of covid-19 and would like to restart, and re answer the questions.  See the History of referred by protocol and completed visits section for details on previous visits  (visits currently in queue to be diagnosed will not appear in this section).    MEDICATIONS: No current medications, ALLERGIES: NKDA  Clinician Response:  Dear Maria Del Rosario,   Your symptoms show that you may have coronavirus (COVID-19). This illness can cause fever, cough and trouble breathing. Many people get a mild case and get better on their own. Some people can get very sick.  What should I do?  We would like to test you for this virus.   1. Please call [phone] to schedule your visit. Explain that you were referred by [facility] to have a COVID-19 test. Be ready to share your [facility] visit ID number.  Please note that if you are assessed for Covid-19 testing and receive an order for testing from [facility], that the scheduling of your Covid test at [facility] may be delayed by three or four days or more due to limited availability for testing. Additional options for testing can be found on the [place] Covid-19 Response website. https://[place].gov/covid19/    The following will serve as your written order for this COVID Test, ordered by me, for the indication of suspected COVID [Z20.828]: The test will be ordered in [employer], our electronic health record, after you are scheduled. It will show as ordered and authorized by Moy Weber MD.  Order: COVID-19 (Coronavirus) PCR for SYMPTOMATIC testing from [facility].   2. When it's time for your COVID test:  Stay at least 6 feet away from others. (If someone will drive you to your test, stay in the backseat, as far away from the  as you can.)   Cover your mouth and nose with a mask, tissue or washcloth.  Go straight to the testing site. Don't make any stops on the way there or back.      3.Starting now: Stay home and away from others (self-isolate) until:   You've had no fever---and no medicine that reduces fever---for one full day (24 hours). And...   Your other symptoms have gotten better. For example, your cough or breathing has improved. And...   At least 10 days have  "passed since your symptoms started.       During this time, don't leave the house except for testing or medical care.   Stay in your own room, even for meals. Use your own bathroom if you can.   Stay away from others in your home. No hugging, kissing or shaking hands. No visitors.  Don't go to work, school or anywhere else.    Clean \"high touch\" surfaces often (doorknobs, counters, handles, etc.). Use a household cleaning spray or wipes. You'll find a full list of  on the EPA website: www.epa.gov/pesticide-registration/list-n-disinfectants-use-against-sars-cov-2.   Cover your mouth and nose with a mask, tissue or washcloth to avoid spreading germs.  Wash your hands and face often. Use soap and water.  Caregivers in these groups are at risk for severe illness due to COVID-19:  o People 65 years and older  o People who live in a nursing home or long-term care facility  o People with chronic disease (lung, heart, cancer, diabetes, kidney, liver, immunologic)  o People who have a weakened immune system, including those who:   Are in cancer treatment  Take medicine that weakens the immune system, such as corticosteroids  Had a bone marrow or organ transplant  Have an immune deficiency  Have poorly controlled HIV or AIDS  Are obese (body mass index of 40 or higher)  Smoke regularly   o Caregivers should wear gloves while washing dishes, handling laundry and cleaning bedrooms and bathrooms.  o Use caution when washing and drying laundry: Don't shake dirty laundry, and use the warmest water setting that you can.  o For more tips, go to www.cdc.gov/coronavirus/2019-ncov/downloads/10Things.pdf.    How can I take care of myself?    Get lots of rest. Drink extra fluids (unless a doctor has told you not to).   Take Tylenol (acetaminophen) for fever or pain. If you have liver or kidney problems, ask your family doctor if it's okay to take Tylenol.   Adults can take either:    650 mg (two 325 mg pills) every 4 to 6 hours, " or...   1,000 mg (two 500 mg pills) every 8 hours as needed.    Note: Don't take more than 3,000 mg in one day. Acetaminophen is found in many medicines (both prescribed and over-the-counter medicines). Read all labels to be sure you don't take too much.   For children, check the Tylenol bottle for the right dose. The dose is based on the child's age or weight.    If you have other health problems (like cancer, heart failure, an organ transplant or severe kidney disease): Call your specialty clinic if you don't feel better in the next 2 days.       Know when to call 911. Emergency warning signs include:    Trouble breathing or shortness of breath Pain or pressure in the chest that doesn't go away Feeling confused like you haven't felt before, or not being able to wake up Bluish-colored lips or face.  Where can I get more information?   [facility] -- About COVID-19: [email or web address]   CDC -- What to Do If You're Sick: www.cdc.gov/coronavirus/2019-ncov/about/steps-when-sick.html   CDC -- Ending Home Isolation: www.cdc.gov/coronavirus/2019-ncov/hcp/disposition-in-home-patients.html   CDC -- Caring for Someone: www.cdc.gov/coronavirus/2019-ncov/if-you-are-sick/care-for-someone.html   [facility] -- Interim Guidance for Hospital Discharge to Home: www.health.[facility].[place].us/diseases/coronavirus/hcp/hospdischarge.pdf   [facility] clinical trials (COVID-19 research studies): clinicalaffairs.[facility]/umn-clinical-trials    Below are the COVID-19 hotlines at the [facility] of Health ([facility]). Interpreters are available.    For health questions: Call 790-379-9362 or 1-934.311.9494 (7 a.m. to 7 p.m.) For questions about schools and childcare: Call 731-048-0341 or 1-948.639.8315 (7 a.m. to 7 p.m.)    Diagnosis: Contact with and (suspected) exposure to other viral communicable diseases  Diagnosis ICD: Z20.828

## 2020-10-28 DIAGNOSIS — Z20.822 SUSPECTED COVID-19 VIRUS INFECTION: Primary | ICD-10-CM

## 2020-10-29 DIAGNOSIS — Z20.822 SUSPECTED COVID-19 VIRUS INFECTION: ICD-10-CM

## 2020-10-29 PROCEDURE — U0003 INFECTIOUS AGENT DETECTION BY NUCLEIC ACID (DNA OR RNA); SEVERE ACUTE RESPIRATORY SYNDROME CORONAVIRUS 2 (SARS-COV-2) (CORONAVIRUS DISEASE [COVID-19]), AMPLIFIED PROBE TECHNIQUE, MAKING USE OF HIGH THROUGHPUT TECHNOLOGIES AS DESCRIBED BY CMS-2020-01-R: HCPCS | Performed by: PHYSICIAN ASSISTANT

## 2020-10-30 LAB
SARS-COV-2 RNA SPEC QL NAA+PROBE: NOT DETECTED
SPECIMEN SOURCE: NORMAL

## 2020-12-06 ENCOUNTER — HEALTH MAINTENANCE LETTER (OUTPATIENT)
Age: 25
End: 2020-12-06

## 2021-01-15 ENCOUNTER — HEALTH MAINTENANCE LETTER (OUTPATIENT)
Age: 26
End: 2021-01-15

## 2021-02-12 DIAGNOSIS — Z30.011 ENCOUNTER FOR INITIAL PRESCRIPTION OF CONTRACEPTIVE PILLS: ICD-10-CM

## 2021-02-12 RX ORDER — LEVONORGESTREL AND ETHINYL ESTRADIOL 0.15-0.03
1 KIT ORAL DAILY
Qty: 84 TABLET | Refills: 2 | Status: SHIPPED | OUTPATIENT
Start: 2021-02-12 | End: 2021-11-01

## 2021-02-12 NOTE — TELEPHONE ENCOUNTER
"Requested Prescriptions   Pending Prescriptions Disp Refills     levonorgestrel-ethinyl estradiol (NORDETTE) 0.15-30 MG-MCG tablet 84 tablet 3     Sig: Take 1 tablet by mouth daily       Contraceptives Protocol Passed - 2/12/2021  8:36 AM        Passed - Patient is not a current smoker if age is 35 or older        Passed - Recent (12 mo) or future (30 days) visit within the authorizing provider's specialty     Patient has had an office visit with the authorizing provider or a provider within the authorizing providers department within the previous 12 mos or has a future within next 30 days. See \"Patient Info\" tab in inbasket, or \"Choose Columns\" in Meds & Orders section of the refill encounter.              Passed - Medication is active on med list        Passed - No active pregnancy on record        Passed - No positive pregnancy test in past 12 months           Prescription approved per [facility] Refill Protocol.    Gayathri Thomas RN    "

## 2021-02-23 ENCOUNTER — VIRTUAL VISIT (OUTPATIENT)
Dept: FAMILY MEDICINE | Facility: CLINIC | Age: 26
End: 2021-02-23
Payer: COMMERCIAL

## 2021-02-23 DIAGNOSIS — F41.1 GAD (GENERALIZED ANXIETY DISORDER): Primary | ICD-10-CM

## 2021-02-23 PROCEDURE — 99213 OFFICE O/P EST LOW 20 MIN: CPT | Mod: 95 | Performed by: FAMILY MEDICINE

## 2021-02-23 ASSESSMENT — ANXIETY QUESTIONNAIRES
IF YOU CHECKED OFF ANY PROBLEMS ON THIS QUESTIONNAIRE, HOW DIFFICULT HAVE THESE PROBLEMS MADE IT FOR YOU TO DO YOUR WORK, TAKE CARE OF THINGS AT HOME, OR GET ALONG WITH OTHER PEOPLE: EXTREMELY DIFFICULT
6. BECOMING EASILY ANNOYED OR IRRITABLE: NEARLY EVERY DAY
GAD7 TOTAL SCORE: 19
5. BEING SO RESTLESS THAT IT IS HARD TO SIT STILL: SEVERAL DAYS
7. FEELING AFRAID AS IF SOMETHING AWFUL MIGHT HAPPEN: NEARLY EVERY DAY
3. WORRYING TOO MUCH ABOUT DIFFERENT THINGS: NEARLY EVERY DAY
1. FEELING NERVOUS, ANXIOUS, OR ON EDGE: NEARLY EVERY DAY
2. NOT BEING ABLE TO STOP OR CONTROL WORRYING: NEARLY EVERY DAY

## 2021-02-23 ASSESSMENT — PATIENT HEALTH QUESTIONNAIRE - PHQ9
5. POOR APPETITE OR OVEREATING: NEARLY EVERY DAY
SUM OF ALL RESPONSES TO PHQ QUESTIONS 1-9: 16

## 2021-02-23 NOTE — PROGRESS NOTES
Maria Del Rosario is a 25 year old who is being evaluated via a billable video visit.      How would you like to obtain your AVS? MyChart  If the video visit is dropped, the invitation should be resent by: Text to cell phone: [phone]  Will anyone else be joining your video visit? No      Video Start Time: 11:13    Assessment & Plan     KEISHA (generalized anxiety disorder)  Trial of zoloft, follow-up in one month, sooner with conters  - sertraline (ZOLOFT) 50 MG tablet; Take 1.2 tablet daily for 1-2 weeks, then increase to full tablet daily             Depression Screening Follow Up    PHQ 2021   PHQ-9 Total Score 16   Q9: Thoughts of better off dead/self-harm past 2 weeks Not at all         Follow Up Actions Taken  Crisis resource information provided in After Visit Summary         No follow-ups on file.    Rachel Blanchard MD  [facility]   Maria Del Rosario is a 25 year old who presents for the following health issues     HPI       Depression and Anxiety Follow-Up    How are you doing with your depression since your last visit? No change    How are you doing with your anxiety since your last visit?  Worsened     Are you having other symptoms that might be associated with depression or anxiety? Yes:  not sleeping, worring, heart racing, trouble breathing, mood swings, lack of motivation     Have you had a significant life event? No     Do you have any concerns with your use of alcohol or other drugs? No    Social History     Tobacco Use     Smoking status: Current Some Day Smoker     Last attempt to quit: 2019     Years since quittin.9     Smokeless tobacco: Never Used   Substance Use Topics     Alcohol use: No     Frequency: Never     Drug use: No     PHQ 2016 8/10/2016 12/10/2019   PHQ-9 Total Score 11 8 5   Q9: Thoughts of better off dead/self-harm past 2 weeks Not at all Not at all Not at all     KEISHA-7 SCORE 2016 8/10/2016 12/10/2019   Total Score 15 18 15     Last  PHQ-9 2/23/2021   1.  Little interest or pleasure in doing things 1   2.  Feeling down, depressed, or hopeless 1   3.  Trouble falling or staying asleep, or sleeping too much 3   4.  Feeling tired or having little energy 3   5.  Poor appetite or overeating 3   6.  Feeling bad about yourself 1   7.  Trouble concentrating 3   8.  Moving slowly or restless 1   Q9: Thoughts of better off dead/self-harm past 2 weeks 0   PHQ-9 Total Score 16   Difficulty at work, home, or with people Extremely dIfficult     KEISHA-7  2/23/2021   1. Feeling nervous, anxious, or on edge 3   2. Not being able to stop or control worrying 3   3. Worrying too much about different things 3   4. Trouble relaxing 3   5. Being so restless that it is hard to sit still 1   6. Becoming easily annoyed or irritable 3   7. Feeling afraid, as if something awful might happen 3   KEISHA-7 Total Score 19   If you checked any problems, how difficult have they made it for you to do your work, take care of things at home, or get along with other people? Extremely difficult       Suicide Assessment Five-step Evaluation and Treatment (SAFE-T)        Pt has been struggling with anxiety most of her life  Started on medication after her second child  Struggling with home, kids and work life.  Please see phq-9 and keisha-7 for details of symptoms    She tried lexapro but it made her wired.   Now would like to try another medication  Her father has been on medication in the past but she does not know what it was     Review of Systems   Constitutional, HEENT, cardiovascular, pulmonary, gi and gu systems are negative, except as otherwise noted.      Objective           Vitals:  No vitals were obtained today due to virtual visit.    Physical Exam   GENERAL: Healthy, alert and no distress  EYES: Eyes grossly normal to inspection.  No discharge or erythema, or obvious scleral/conjunctival abnormalities.  RESP: No audible wheeze, cough, or visible cyanosis.  No visible retractions  or increased work of breathing.    SKIN: Visible skin clear. No significant rash, abnormal pigmentation or lesions.  NEURO: Cranial nerves grossly intact.  Mentation and speech appropriate for age.  PSYCH: Mentation appears normal, affect normal/bright, judgement and insight intact, normal speech and appearance well-groomed.    No results found for this or any previous visit (from the past 24 hour(s)).            Video-Visit Details    Type of service:  Video Visit    Video End Time:11:22 AM    Originating Location (pt. Location): Home    Distant Location (provider location):  [facility]     Platform used for Video Visit: [employer]

## 2021-02-24 ASSESSMENT — ANXIETY QUESTIONNAIRES: GAD7 TOTAL SCORE: 19

## 2021-06-16 ENCOUNTER — E-VISIT (OUTPATIENT)
Dept: URGENT CARE | Facility: URGENT CARE | Age: 26
End: 2021-06-16
Payer: COMMERCIAL

## 2021-06-16 DIAGNOSIS — Z20.822 SUSPECTED COVID-19 VIRUS INFECTION: Primary | ICD-10-CM

## 2021-06-16 PROCEDURE — 99207 PR NO CHARGE LOS: CPT | Performed by: PHYSICIAN ASSISTANT

## 2021-06-16 NOTE — PATIENT INSTRUCTIONS
Dear Maria Del Rosario Baregr,    We are sorry you are not feeling well. Based on the responses you provided, it is recommended that you be seen in-person in urgent care so we can better evaluate your symptoms. Please click here to find the nearest urgent care location to you.   You will not be charged for this Visit. Thank you for trusting us with your care.    Reese Doyle PA-C

## 2021-06-17 ENCOUNTER — MYC MEDICAL ADVICE (OUTPATIENT)
Dept: FAMILY MEDICINE | Facility: CLINIC | Age: 26
End: 2021-06-17

## 2021-06-17 ENCOUNTER — OFFICE VISIT (OUTPATIENT)
Dept: FAMILY MEDICINE | Facility: CLINIC | Age: 26
End: 2021-06-17
Payer: COMMERCIAL

## 2021-06-17 VITALS
WEIGHT: 171 LBS | HEART RATE: 108 BPM | TEMPERATURE: 98.9 F | SYSTOLIC BLOOD PRESSURE: 121 MMHG | DIASTOLIC BLOOD PRESSURE: 79 MMHG | BODY MASS INDEX: 29.58 KG/M2 | OXYGEN SATURATION: 99 %

## 2021-06-17 DIAGNOSIS — Z11.52 ENCOUNTER FOR SCREENING FOR COVID-19: ICD-10-CM

## 2021-06-17 DIAGNOSIS — Z32.00 PREGNANCY EXAMINATION OR TEST, PREGNANCY UNCONFIRMED: Primary | ICD-10-CM

## 2021-06-17 DIAGNOSIS — B34.9 VIRAL SYNDROME: ICD-10-CM

## 2021-06-17 LAB
DEPRECATED S PYO AG THROAT QL EIA: NEGATIVE
HCG UR QL: NEGATIVE
LABORATORY COMMENT REPORT: NORMAL
SARS-COV-2 RNA RESP QL NAA+PROBE: NEGATIVE
SARS-COV-2 RNA RESP QL NAA+PROBE: NORMAL
SPECIMEN SOURCE: NORMAL
STREP GROUP A PCR: NOT DETECTED

## 2021-06-17 PROCEDURE — U0003 INFECTIOUS AGENT DETECTION BY NUCLEIC ACID (DNA OR RNA); SEVERE ACUTE RESPIRATORY SYNDROME CORONAVIRUS 2 (SARS-COV-2) (CORONAVIRUS DISEASE [COVID-19]), AMPLIFIED PROBE TECHNIQUE, MAKING USE OF HIGH THROUGHPUT TECHNOLOGIES AS DESCRIBED BY CMS-2020-01-R: HCPCS | Performed by: NURSE PRACTITIONER

## 2021-06-17 PROCEDURE — 87651 STREP A DNA AMP PROBE: CPT | Performed by: NURSE PRACTITIONER

## 2021-06-17 PROCEDURE — 99N1174 PR STATISTIC STREP A RAPID: Performed by: NURSE PRACTITIONER

## 2021-06-17 PROCEDURE — 99213 OFFICE O/P EST LOW 20 MIN: CPT | Performed by: NURSE PRACTITIONER

## 2021-06-17 PROCEDURE — 81025 URINE PREGNANCY TEST: CPT | Performed by: NURSE PRACTITIONER

## 2021-06-17 PROCEDURE — U0005 INFEC AGEN DETEC AMPLI PROBE: HCPCS | Performed by: NURSE PRACTITIONER

## 2021-06-17 NOTE — TELEPHONE ENCOUNTER
Routing [employer] message to provider to review and advise     Greer Wilson RN, BSN, CMSRN  [facility]

## 2021-06-17 NOTE — PROGRESS NOTES
Assessment & Plan     Pregnancy examination or test, pregnancy unconfirmed  Has missed several OCP's, has concern for pregnancy  - HCG qualitative urine    Viral syndrome  push fluids, rest, symptomatic treatment as needed  - Streptococcus A Rapid Scr w Reflx to PCR    Encounter for screening for COVID-19  Home care reviewed   - Symptomatic COVID-19 Virus (Coronavirus) by PCR       See Patient Instructions    Return today (on 2021), or if symptoms worsen or fail to improve, for Follow up.    MAGED Castillo [facility] STEPHANIE Mix is a 25 year old who presents for the following health issues  accompanied by her 2 children  :    HPI       Pregnancy test- missed menses last month, often forgot to take her pill,nipples are tender.  She is     Concern for COVID-19  About how many days ago did these symptoms start? 2 days  Is this your first visit for this illness? Yes  In the 14 days before your symptoms started, have you had close contact with someone with COVID-19 (Coronavirus)? No  Do you have a fever or chills? Yes, the highest temperature was 99.6 2 days ago  Are you having new or worsening difficulty breathing? No  Do you have new or worsening cough? Yes, it's a dry cough.   Have you had any new or unexplained body aches? YES    Have you experienced any of the following NEW symptoms?    Headache: YES -frontal    Sore throat: YES    Loss of taste or smell: YES    Chest pain: No    Diarrhea: No    Rash: No  What treatments have you tried? Sudafed- no help, Ibujprofen and or Tylenol- helps fever but doesn't help with headache  Who do you live with? 2 kids and their father  Are you, or a household member, a healthcare worker or a ? No  Do you live in a nursing home, group home, or shelter? No  Do you have a way to get food/medications if quarantined? Yes, I have a friend or family member who can help me.      Review of Systems    Constitutional, HEENT, cardiovascular, pulmonary, gi and gu systems are negative, except as otherwise noted.      Objective    /79 (BP Location: Left arm, Patient Position: Sitting, Cuff Size: Adult Large)   Pulse 108   Temp 98.9  F (37.2  C) (Oral)   Wt 77.6 kg (171 lb)   SpO2 99%   BMI 29.58 kg/m    Body mass index is 29.58 kg/m .  Physical Exam   GENERAL: healthy, alert and no distress  EYES: Eyes grossly normal to inspection, PERRL and conjunctivae and sclerae normal  HENT: ear canals and TM's normal, nose and mouth without ulcers or lesions  NECK: no adenopathy, no asymmetry, masses, or scars and thyroid normal to palpation  RESP: lungs clear to auscultation - no rales, rhonchi or wheezes  CV: regular rate and rhythm, normal S1 S2, no S3 or S4, no murmur, click or rub, no peripheral edema and peripheral pulses strong  ABDOMEN: soft, nontender, no hepatosplenomegaly, no masses and bowel sounds normal  MS: no gross musculoskeletal defects noted, no edema  SKIN: no suspicious lesions or rashes  NEURO: Normal strength and tone, mentation intact and speech normal  BACK: no CVA tenderness, no paralumbar tenderness  PSYCH: mentation appears normal, affect normal/bright  LYMPH: normal ant/post cervical, supraclavicular nodes    No results found for this or any previous visit (from the past 24 hour(s)).

## 2021-06-17 NOTE — PATIENT INSTRUCTIONS
At [facility], we strive to deliver an exceptional experience to you, every time we see you. If you receive a survey, please complete it as we do value your feedback.  If you have MyChart, you can expect to receive results automatically within 24 hours of their completion.  Your provider will send a note interpreting your results as well.   If you do not have MyChart, you should receive your results in about a week by mail.    Your care team:                            Family Medicine Internal Medicine   MD Deion Kelsey MD Shantel Branch-Fleming, MD Srinivasa Vaka, MD Katya Belousova, [place]AV[place]IA Tavares, APRN CNP    Beni Gregg, MD Pediatrics   Neal Kiran, PAV[place]IA Huynh, CNP MD Bharati Cobos APRN CNP   MD Suzanne Beach MD Deborah Mielke, MD Amy Sharma, APRN [facility]      Clinic hours: Monday - Thursday 7 am-6 pm; Fridays 7 am-5 pm.   Urgent care: Monday - Friday 10 am- 8 pm; Saturday and Sunday 9 am-5 pm.    Clinic: [phone]       [facility]: Monday - Thursday 8 am - 7 pm; Friday 8 am - 6 pm  [facility]: [phone]     Use [email or web address] for 24/7 diagnosis and treatment of dozens of conditions.    Patient Education   After Your COVID-19 (Coronavirus) Test  You have been tested for COVID-19 (coronavirus).   If you'll have surgery in the next few days, we'll let you know ahead of time if you have the virus. Please call your surgeon's office with any questions.  For all other patients: Results are usually available in OnForcet within 2 to 3 days.   If you do not have a ChangeTip account, you'll get a letter in the mail in about 7 to 10 days.   [employer] is often the fastest way to get test results. Please sign up if you do not already have a OnForcet account. See the handout Getting COVID-19 Test Results in OnForcet for help.  What if my test result is  "positive?  If your test is positive and you have not viewed your result in [employer], you'll get a phone call with your result. (A positive test means that you have the virus.)     Follow the tips under \"How do I self-isolate?\" below for 10 days (20 days if you have a weak immune system).    You don't need to be retested for COVID-19 before going back to school or work. As long as you're fever-free and feeling better, you can go back to school, work and other activities after waiting the 10 or 20 days.  What if I have questions after I get my results?  If you have questions about your results, please visit our testing website at [email or web address].   After 7 to 10 days, if you have not gotten your results:     Call [phone] ([phone]) and ask to speak with our COVID-19 results team.    If you're being treated at an infusion center: Call your infusion center directly.  What are the symptoms of COVID-19?  Cough, fever and trouble breathing are the most common signs of COVID-19.  Other symptoms can include new headaches, new muscle or body aches, new and unexplained fatigue (feeling very tired), chills, sore throat, congestion (stuffy or runny nose), diarrhea (loose poop), loss of taste or smell, belly pain, and nausea or vomiting (feeling sick to your stomach or throwing up).  You may already have symptoms of COVID-19, or they may show up later.  What should I do if I have symptoms?  If you're having surgery: Call your surgeon's office.  For all other patients: Stay home and away from others (self-isolate) until ...    You've had no fever--and no medicine that reduces fever--for 1 full day (24 hours), AND    Other symptoms have gotten better. For example, your cough or breathing has improved, AND    At least 10 days have passed since your symptoms first started.  How do I self-isolate?    Stay in your own room, even for meals. Use your own bathroom if you can.    Stay away " "from others in your home. No hugging, kissing or shaking hands. No visitors.    Don't go to work, school or anywhere else.    Clean \"high touch\" surfaces often (doorknobs, counters, handles). Use household cleaning spray or wipes. You'll find a full list of  on the EPA website: www.epa.gov/pesticide-registration/list-n-disinfectants-use-against-sars-cov-2.    Cover your mouth and nose with a mask or other face covering to avoid spreading germs.    Wash your hands and face often. Use soap and water.    Caregivers in these groups are at risk for severe illness due to COVID-19:  ? People 65 years and older  ? People who live in a nursing home or long-term care facility  ? People with chronic disease (lung, heart, cancer, diabetes, kidney, liver, immunologic)  ? People who have a weakened immune system, including those who:    Are in cancer treatment    Take medicine that weakens the immune system, such as corticosteroids    Had a bone marrow or organ transplant    Have an immune deficiency    Have poorly controlled HIV or AIDS    Are obese (body mass index of 40 or higher)    Smoke regularly    Caregivers should wear gloves while washing dishes, handling laundry and cleaning bedrooms and bathrooms.    Use caution when washing and drying laundry: Don't shake dirty laundry and use the warmest water setting that you can.    For more tips on managing your health at home, go to www.cdc.gov/coronavirus/2019-ncov/downloads/10Things.pdf.  How can I take care of myself at home?  1. Get lots of rest. Drink extra fluids (unless a doctor has told you not to).  2. Take Tylenol (acetaminophen) for fever or pain. If you have liver or kidney problems, ask your family doctor if it's OK to take Tylenol.   Adults can take either:  ? 650 mg (two 325 mg pills) every 4 to 6 hours, or   ? 1,000 mg (two 500 mg pills) every 8 hours as needed.  ? Note: Don't take more than 3,000 mg in one day. Acetaminophen is found in many medicines " (both prescribed and over-the-counter medicines). Read all labels to be sure you don't take too much.   For children, check the Tylenol bottle for the right dose. The dose is based on the child's age or weight.  3. If you have other health problems (like cancer, heart failure, an organ transplant or severe kidney disease): Call your specialty clinic if you don't feel better in the next 2 days.  4. Know when to call 911. Emergency warning signs include:  ? Trouble breathing or shortness of breath  ? Chest pain or pressure that doesn't go away  ? Feeling confused like you haven't felt before, or not being able to wake up  ? Bluish-colored lips or face  5. If your doctor prescribed a blood thinner medicine: Follow their instructions.  Where can I get more information?    [facility] - About COVID-19:   [email or web address]    CDC - If You're Sick: cdc.gov/coronavirus/2019-ncov/about/steps-when-sick.html    CDC - Ending Home Isolation: www.cdc.gov/coronavirus/2019-ncov/hcp/disposition-in-home-patients.html    CDC - Caring for Someone: www.cdc.gov/coronavirus/2019-ncov/if-you-are-sick/care-for-someone.html    [facility] - Interim Guidance for Hospital Discharge to Home: www.health.[facility].mn.us/diseases/coronavirus/hcp/hospdischarge.pdf    [facility] clinical trials (COVID-19 research studies): clinicalaffairs.[facility]-clinical-trials    Below are the COVID-19 hotlines at the [facility] of Health ([facility]). Interpreters are available.  ? For health questions: Call 845-034-0570 or 1-699.284.3064 (7 a.m. to 7 p.m.)  ? For questions about schools and childcare: Call 241-086-6110 or [phone] (7 a.m. to 7 p.m.)    For informational purposes only. Not to replace the advice of your health care provider. Clinically reviewed by Infection Prevention and the [facility] COVID-19 Clinical Team. Copyright   2020 [facility]. All rights reserved. [employer] - Rev  "11/11/20.       Patient Education     Viral Syndrome (Adult)  A viral illness may cause a number of symptoms such as fever. Other symptoms depend on the part of the body that the virus affects. If it settles in your nose, throat, and lungs, it may cause cough, sore throat, congestion, runny nose, headache, earache and other ear symptoms, or shortness of breath. If it settles in your stomach and intestinal tract, it may cause nausea, vomiting, cramping, and diarrhea. Sometimes it causes generalized symptoms like \"aching all over,\" feeling tired, loss of energy, or loss of appetite.  A viral illness usually lasts anywhere from several days to several weeks, but sometimes it lasts longer. In some cases, a more serious infection can look like a viral syndrome in the first few days of the illness. You may need another exam and additional tests to know the difference. Watch for the warning signs listed below for when to seek medical advice.  Home care  Follow these guidelines for taking care of yourself at home:    If symptoms are severe, rest at home for the first 2 to 3 days.    Stay away from cigarette smoke - both your smoke and the smoke from others.    You may use over-the-counter acetaminophen or ibuprofen for fever, muscle aching, and headache, unless another medicine was prescribed for this. If you have chronic liver or kidney disease or ever had a stomach ulcer or gastrointestinal bleeding, talk with your healthcare provider before using these medicines. No one who is younger than 18 and ill with a fever should take aspirin. It may cause severe disease or death.    Your appetite may be poor, so a light diet is fine. Avoid dehydration by drinking 8 to 12, 8-ounce glasses of fluids each day. This may include water; orange juice; lemonade; apple, grape, and cranberry juice; clear fruit drinks; electrolyte replacement and sports drinks; and decaffeinated teas and coffee. If you have been diagnosed with a kidney " disease, ask your healthcare provider how much and what types of fluids you should drink to prevent dehydration. If you have kidney disease, drinking too much fluid can cause it build up in the your body and be dangerous to your health.    Over-the-counter remedies won't shorten the length of the illness but may be helpful for symptoms such as cough, sore throat, nasal and sinus congestion, or diarrhea. Don't use decongestants if you have high blood pressure.  Follow-up care  Follow up with your healthcare provider if you do not improve over the next week.  Call 911  Call 911 if any of the following occur:    Convulsion    Feeling weak, dizzy, or like you are going to faint    Chest pain, or more than mild shortness of breath  When to seek medical advice  Call your healthcare provider right away if any of these occur:    Cough with lots of colored sputum (mucus) or blood in your sputum    Chest pain, shortness of breath, wheezing, or trouble breathing    Severe headache; face, neck, or ear pain    Severe, constant pain in the lower right side of your belly (abdominal)    Continued vomiting (can t keep liquids down)    Frequent diarrhea (more than 5 times a day); blood (red or black color) or mucus in diarrhea    Feeling weak, dizzy, or like you are going to faint    Extreme thirst    Fever of 100.4 F (38 C) or higher, or as directed by your healthcare provider  John last reviewed this educational content on 4/1/2018 2000-2021 [employer]. All rights reserved. This information is not intended as a substitute for professional medical care. Always follow your healthcare professional's instructions.

## 2021-06-17 NOTE — LETTER
June 21, 2021      Maria Del Rosario Barger  [address]  [place] [address]        To Whom It May Concern:     Maria Del Rosario Barger was seen in our clinic. She may return to work without restrictions.      Sincerely,        MAGED Castillo CNP

## 2021-06-18 NOTE — TELEPHONE ENCOUNTER
Patient calling to check on the status of the letter as her employer is wanting this sooner than later. She states that they need the negative result and have it state she can go back to work. Amy is off today.  Felicia Orr [facility]  2nd Floor  Primary Care

## 2021-06-21 NOTE — TELEPHONE ENCOUNTER
Received signed letter. Bringing to the  by 6:00 pm today, 6/21/2021. Sent patient a My Chart message regarding this.  Felicia Orr [facility]  2nd Floor  Primary Care

## 2021-09-17 NOTE — TELEPHONE ENCOUNTER
9/17/2021 letter was not picked up and placed in the shred box.  Felicia Orr MA  [facility]  2nd Floor  Primary Care

## 2021-09-25 ENCOUNTER — HEALTH MAINTENANCE LETTER (OUTPATIENT)
Age: 26
End: 2021-09-25

## 2021-10-31 DIAGNOSIS — Z30.011 ENCOUNTER FOR INITIAL PRESCRIPTION OF CONTRACEPTIVE PILLS: ICD-10-CM

## 2021-11-01 RX ORDER — LEVONORGESTREL AND ETHINYL ESTRADIOL 0.15-0.03
KIT ORAL
Qty: 84 TABLET | Refills: 0 | Status: SHIPPED | OUTPATIENT
Start: 2021-11-01 | End: 2021-12-02

## 2021-11-01 NOTE — TELEPHONE ENCOUNTER
"Requested Prescriptions   Pending Prescriptions Disp Refills     KURVELO 0.15-30 MG-MCG tablet [Pharmacy Med Name: KURVELO TABLETS 28S] 84 tablet 2     Sig: TAKE 1 TABLET BY MOUTH DAILY       Contraceptives Protocol Failed - 10/31/2021  3:13 AM        Failed - Recent (12 mo) or future (30 days) visit within the authorizing provider's specialty     Patient has had an office visit with the authorizing provider or a provider within the authorizing providers department within the previous 12 mos or has a future within next 30 days. See \"Patient Info\" tab in inbasket, or \"Choose Columns\" in Meds & Orders section of the refill encounter.              Passed - Patient is not a current smoker if age is 35 or older        Passed - Medication is active on med list        Passed - No active pregnancy on record        Passed - No positive pregnancy test in past 12 months           Pt was last seen in clinic by MAGED Smith CNM, on 9/22/2020.    Medication is being filled for 1 time refill only due to:  Patient needs to be seen because it has been more than one year since last visit.    Sending pt a [employer] message to schedule an appt with Baylee for further refills.    Gayathri Thomas RN    "

## 2021-12-02 ENCOUNTER — OFFICE VISIT (OUTPATIENT)
Dept: FAMILY MEDICINE | Facility: CLINIC | Age: 26
End: 2021-12-02
Payer: COMMERCIAL

## 2021-12-02 VITALS
SYSTOLIC BLOOD PRESSURE: 127 MMHG | WEIGHT: 171 LBS | HEIGHT: 64 IN | BODY MASS INDEX: 29.19 KG/M2 | OXYGEN SATURATION: 96 % | DIASTOLIC BLOOD PRESSURE: 85 MMHG | HEART RATE: 83 BPM | TEMPERATURE: 98.6 F

## 2021-12-02 DIAGNOSIS — Z00.00 ROUTINE GENERAL MEDICAL EXAMINATION AT A HEALTH CARE FACILITY: Primary | ICD-10-CM

## 2021-12-02 DIAGNOSIS — Z30.011 ENCOUNTER FOR INITIAL PRESCRIPTION OF CONTRACEPTIVE PILLS: ICD-10-CM

## 2021-12-02 DIAGNOSIS — F41.9 ANXIETY: ICD-10-CM

## 2021-12-02 DIAGNOSIS — Z12.4 SCREENING FOR CERVICAL CANCER: ICD-10-CM

## 2021-12-02 PROCEDURE — G0145 SCR C/V CYTO,THINLAYER,RESCR: HCPCS | Performed by: NURSE PRACTITIONER

## 2021-12-02 PROCEDURE — 99214 OFFICE O/P EST MOD 30 MIN: CPT | Mod: 25 | Performed by: NURSE PRACTITIONER

## 2021-12-02 PROCEDURE — 99395 PREV VISIT EST AGE 18-39: CPT | Performed by: NURSE PRACTITIONER

## 2021-12-02 RX ORDER — FLUOXETINE 10 MG/1
10 CAPSULE ORAL DAILY
Qty: 30 CAPSULE | Refills: 2 | Status: SHIPPED | OUTPATIENT
Start: 2021-12-02 | End: 2022-03-07

## 2021-12-02 RX ORDER — LEVONORGESTREL AND ETHINYL ESTRADIOL 0.15-0.03
1 KIT ORAL DAILY
Qty: 84 TABLET | Refills: 3 | Status: SHIPPED | OUTPATIENT
Start: 2021-12-02 | End: 2022-08-11

## 2021-12-02 ASSESSMENT — PATIENT HEALTH QUESTIONNAIRE - PHQ9
SUM OF ALL RESPONSES TO PHQ QUESTIONS 1-9: 17
SUM OF ALL RESPONSES TO PHQ QUESTIONS 1-9: 17
10. IF YOU CHECKED OFF ANY PROBLEMS, HOW DIFFICULT HAVE THESE PROBLEMS MADE IT FOR YOU TO DO YOUR WORK, TAKE CARE OF THINGS AT HOME, OR GET ALONG WITH OTHER PEOPLE: VERY DIFFICULT

## 2021-12-02 ASSESSMENT — PAIN SCALES - GENERAL: PAINLEVEL: NO PAIN (0)

## 2021-12-02 ASSESSMENT — ENCOUNTER SYMPTOMS
HEMATURIA: 0
HEARTBURN: 0
SHORTNESS OF BREATH: 0
HEMATOCHEZIA: 0
WEAKNESS: 0
PALPITATIONS: 0
ABDOMINAL PAIN: 0
NAUSEA: 0
EYE PAIN: 0
CONSTIPATION: 0
DYSURIA: 0
FREQUENCY: 0
DIARRHEA: 0
COUGH: 0
NERVOUS/ANXIOUS: 1
CHILLS: 0
JOINT SWELLING: 0
PARESTHESIAS: 0
SORE THROAT: 0
BREAST MASS: 0
FEVER: 0
HEADACHES: 0
DIZZINESS: 1
MYALGIAS: 0
ARTHRALGIAS: 0

## 2021-12-02 ASSESSMENT — MIFFLIN-ST. JEOR: SCORE: 1500.65

## 2021-12-02 NOTE — PROGRESS NOTES
SUBJECTIVE:   CC: Maria Del Rosario Barger is an 26 year old woman who presents for preventive health visit.     Would like to restart birth control pill, she ran out.  She had unprotected sex with  partner last week, used Plan B pill the next day, had bleeding for a few days after.   Pap due today. Denies hx of abnormal.   History of anxiety.  Seeing a therapist.  Therapist recommends she try Prozac. Patient felt like Celexa and Lexapro didn't help, made anxiety worse- hasn't been on either medication for a while. No SI or HI.     Patient has been advised of split billing requirements and indicates understanding: Yes  Healthy Habits:     Getting at least 3 servings of Calcium per day:  NO    Bi-annual eye exam:  NO    Dental care twice a year:  NO    Sleep apnea or symptoms of sleep apnea:  Daytime drowsiness    Diet:  Regular (no restrictions)    Frequency of exercise:  4-5 days/week    Duration of exercise:  30-45 minutes    Taking medications regularly:  Not Applicable    Medication side effects:  Not applicable    PHQ-2 Total Score: 3    Additional concerns today:  No      Today's PHQ-2 Score:   PHQ-2 ( 1999 Pfizer) 12/2/2021   Q1: Little interest or pleasure in doing things 2   Q2: Feeling down, depressed or hopeless 1   PHQ-2 Score 3   PHQ-2 Total Score (12-17 Years)- Positive if 3 or more points; Administer PHQ-A if positive -   Q1: Little interest or pleasure in doing things More than half the days   Q2: Feeling down, depressed or hopeless Several days   PHQ-2 Score 3       Abuse: Current or Past (Physical, Sexual or Emotional) - No  Do you feel safe in your environment? Yes    Have you ever done Advance Care Planning? (For example, a Health Directive, POLST, or a discussion with a medical provider or your loved ones about your wishes): No, advance care planning information given to patient to review.  Patient plans to discuss their wishes with loved ones or provider.      Social History     Tobacco Use      Smoking status: Former Smoker     Quit date: 10/27/2020     Years since quittin.1     Smokeless tobacco: Never Used   Substance Use Topics     Alcohol use: No     If you drink alcohol do you typically have >3 drinks per day or >7 drinks per week? No    Alcohol Use 2021   Prescreen: >3 drinks/day or >7 drinks/week? No   Prescreen: >3 drinks/day or >7 drinks/week? -       Reviewed orders with patient.  Reviewed health maintenance and updated orders accordingly - Yes      Breast Cancer Screening:    Breast CA Risk Assessment (FHS-7) 2021   Do you have a family history of breast, colon, or ovarian cancer? No / Unknown         Patient under 40 years of age: Routine Mammogram Screening not recommended.   Pertinent mammograms are reviewed under the imaging tab.    History of abnormal Pap smear: NO - age 21-29 PAP every 3 years recommended  PAP / HPV 3/26/2019 2016   PAP (Historical) NIL NIL     Reviewed and updated as needed this visit by clinical staff  Tobacco  Allergies  Meds  Problems  Med Hx  Surg Hx  Fam Hx         Reviewed and updated as needed this visit by Provider  Tobacco  Allergies  Meds  Problems  Med Hx  Surg Hx  Fam Hx          Review of Systems   Constitutional: Negative for chills and fever.   HENT: Negative for congestion, ear pain, hearing loss and sore throat.    Eyes: Negative for pain and visual disturbance.   Respiratory: Negative for cough and shortness of breath.    Cardiovascular: Negative for chest pain, palpitations and peripheral edema.   Gastrointestinal: Negative for abdominal pain, constipation, diarrhea, heartburn, hematochezia and nausea.   Breasts:  Negative for tenderness, breast mass and discharge.   Genitourinary: Positive for vaginal bleeding and vaginal discharge. Negative for dysuria, frequency, genital sores, hematuria, pelvic pain and urgency.   Musculoskeletal: Negative for arthralgias, joint swelling and myalgias.   Skin: Negative for rash.  "  Neurological: Positive for dizziness. Negative for weakness, headaches and paresthesias.   Psychiatric/Behavioral: Positive for mood changes. The patient is nervous/anxious.         OBJECTIVE:   /85   Pulse 83   Temp 98.6  F (37  C)   Ht 1.626 m (5' 4\")   Wt 77.6 kg (171 lb)   SpO2 96%   BMI 29.35 kg/m    Physical Exam  GENERAL: healthy, alert and no distress  EYES: Eyes grossly normal to inspection, PERRL and conjunctivae and sclerae normal  HENT: ear canals and TM's normal, nose and mouth without ulcers or lesions  NECK: no adenopathy, no asymmetry, masses, or scars and thyroid normal to palpation  RESP: lungs clear to auscultation - no rales, rhonchi or wheezes  BREAST: normal without masses, tenderness or nipple discharge and no palpable axillary masses or adenopathy  CV: regular rate and rhythm, normal S1 S2, no S3 or S4, no murmur, click or rub, no peripheral edema and peripheral pulses strong  ABDOMEN: soft, nontender, no hepatosplenomegaly, no masses and bowel sounds normal   (female): normal female external genitalia, normal urethral meatus, vaginal mucosa pink, moist, well rugated, and normal cervix/adnexa/uterus without masses or discharge  MS: no gross musculoskeletal defects noted, no edema  SKIN: no suspicious lesions or rashes  NEURO: Normal strength and tone, mentation intact and speech normal  PSYCH: mentation appears normal, affect normal/bright    Diagnostic Test Results:  Labs reviewed in Epic    ASSESSMENT/PLAN:   (Z00.00) Routine general medical examination at a health care facility  (primary encounter diagnosis)  Comment:   Plan: continue annual exams    (Z30.011) Encounter for initial prescription of contraceptive pills  Comment: Chronic, stable. Discussed instructions for restarting OCP.   Plan: levonorgestrel-ethinyl estradiol (KURVELO)         0.15-30 MG-MCG tablet          (F41.9) Anxiety  Comment: Chronic, not well controlled.   Plan: FLUoxetine (PROZAC) 10 MG capsule     " "   Start Prozac 10 mg daily.  Continue therapy.  Follow-up in 3 months, sooner if concerns.     (Z12.4) Screening for cervical cancer  Comment: due for screening  Plan: PAP screen reflex to HPV if ASCUS - recommended        age 25 - 29 years, HPV Hold (Lab Only)            Patient has been advised of split billing requirements and indicates understanding: Yes  COUNSELING:  Reviewed preventive health counseling, as reflected in patient instructions    Estimated body mass index is 29.35 kg/m  as calculated from the following:    Height as of this encounter: 1.626 m (5' 4\").    Weight as of this encounter: 77.6 kg (171 lb).      She reports that she quit smoking about 13 months ago. She has never used smokeless tobacco.      Counseling Resources:  ATP IV Guidelines  Pooled Cohorts Equation Calculator  Breast Cancer Risk Calculator  BRCA-Related Cancer Risk Assessment: FHS-7 Tool  FRAX Risk Assessment  ICSI Preventive Guidelines  Dietary Guidelines for Americans, 2010  [employer]'s MyPlate  ASA Prophylaxis  Lung CA Screening    Candace Diaz [facility] [place]  Answers for HPI/ROS submitted by the patient on 12/2/2021  If you checked off any problems, how difficult have these problems made it for you to do your work, take care of things at home, or get along with other people?: Very difficult  PHQ9 TOTAL SCORE: 17      "

## 2021-12-02 NOTE — PATIENT INSTRUCTIONS
Start Prozac 10 mg once daily.  Follow-up in 3 months, sooner if concerns.  Refill sent on birth control pill.        Preventive Health Recommendations  Female Ages 26 - 39  Yearly exam:   See your health care provider every year in order to    Review health changes.     Discuss preventive care.      Review your medicines if you your doctor has prescribed any.    Until age 30: Get a Pap test every three years (more often if you have had an abnormal result).    After age 30: Talk to your doctor about whether you should have a Pap test every 3 years or have a Pap test with HPV screening every 5 years.   You do not need a Pap test if your uterus was removed (hysterectomy) and you have not had cancer.  You should be tested each year for STDs (sexually transmitted diseases), if you're at risk.   Talk to your provider about how often to have your cholesterol checked.  If you are at risk for diabetes, you should have a diabetes test (fasting glucose).  Shots: Get a flu shot each year. Get a tetanus shot every 10 years.   Nutrition:     Eat at least 5 servings of fruits and vegetables each day.    Eat whole-grain bread, whole-wheat pasta and brown rice instead of white grains and rice.    Get adequate Calcium and Vitamin D.     Lifestyle    Exercise at least 150 minutes a week (30 minutes a day, 5 days of the week). This will help you control your weight and prevent disease.    Limit alcohol to one drink per day.    No smoking.     Wear sunscreen to prevent skin cancer.    See your dentist every six months for an exam and cleaning.

## 2021-12-03 ASSESSMENT — PATIENT HEALTH QUESTIONNAIRE - PHQ9: SUM OF ALL RESPONSES TO PHQ QUESTIONS 1-9: 17

## 2021-12-06 ENCOUNTER — MYC MEDICAL ADVICE (OUTPATIENT)
Dept: FAMILY MEDICINE | Facility: CLINIC | Age: 26
End: 2021-12-06
Payer: COMMERCIAL

## 2021-12-06 LAB
BKR LAB AP GYN ADEQUACY: NORMAL
BKR LAB AP GYN INTERPRETATION: NORMAL
BKR LAB AP HPV REFLEX: NORMAL
BKR LAB AP PREVIOUS ABNORMAL: NORMAL
PATH REPORT.COMMENTS IMP SPEC: NORMAL
PATH REPORT.COMMENTS IMP SPEC: NORMAL
PATH REPORT.RELEVANT HX SPEC: NORMAL

## 2022-01-06 ENCOUNTER — E-VISIT (OUTPATIENT)
Dept: FAMILY MEDICINE | Facility: CLINIC | Age: 27
End: 2022-01-06
Payer: COMMERCIAL

## 2022-01-06 DIAGNOSIS — N92.6 IRREGULAR MENSES: Primary | ICD-10-CM

## 2022-01-06 PROCEDURE — 99421 OL DIG E/M SVC 5-10 MIN: CPT | Performed by: NURSE PRACTITIONER

## 2022-01-30 ENCOUNTER — MYC MEDICAL ADVICE (OUTPATIENT)
Dept: FAMILY MEDICINE | Facility: CLINIC | Age: 27
End: 2022-01-30
Payer: COMMERCIAL

## 2022-02-24 NOTE — PROGRESS NOTES
"Maria Del Rosario is a 26 year old who is being evaluated via a billable video visit.      How would you like to obtain your AVS? MyChart  If the video visit is dropped, the invitation should be resent by: Send to e-mail at: [email or web address]  Will anyone else be joining your video visit? No    Video Start Time: 715      Assessment & Plan     Clinical diagnosis of COVID-19  Mostly recovered, some occasional cough/wheeze.  Albuterol prn. Follow-up if not improving.   - albuterol (PROAIR HFA/PROVENTIL HFA/VENTOLIN HFA) 108 (90 Base) MCG/ACT inhaler; Inhale 2 puffs into the lungs every 4 hours as needed for shortness of breath / dyspnea or wheezing    Cough  See above.   - albuterol (PROAIR HFA/PROVENTIL HFA/VENTOLIN HFA) 108 (90 Base) MCG/ACT inhaler; Inhale 2 puffs into the lungs every 4 hours as needed for shortness of breath / dyspnea or wheezing    KEISHA (generalized anxiety disorder)  Improving but not at goal.  Increase fluoxetine to 20 mg.  Follow-up in 6 months, sooner if concerns.     - FLUoxetine (PROZAC) 20 MG capsule; Take 1 capsule (20 mg) by mouth daily         BMI:   Estimated body mass index is 29.35 kg/m  as calculated from the following:    Height as of 12/2/21: 1.626 m (5' 4\").    Weight as of 12/2/21: 77.6 kg (171 lb).       Return in about 6 months (around 8/25/2022) for Follow-up.    Candace Diaz [facility] [place]   Maria Del Rosario is a 26 year old who presents for the following health issues     HPI     Depression and Anxiety Follow-Up    How are you doing with your depression since your last visit? A little bit better    How are you doing with your anxiety since your last visit?  A little bit better    Are you having other symptoms that might be associated with depression or anxiety? No    Have you had a significant life event? Planning a wedding     Do you have any concerns with your use of alcohol or other drugs? No       Started Prozac in 10 mg a few months ago for " anxiety and depression.  Feels like medication is working, but feels like she could use a high dose.  No side effects.  Getting  in 6 weeks, has a lot of stress and anxiety.      Had covid-19 in 2021.  Mostly recovered but still has episodes of wheezing and coughing up phlegm.  Is able to be active and go to work, tolerates well.  Episodes seem more prominent during sexual intercourse.  Overall denies any significant SOB. No CP.  Cough wheeze is episodic.  Borrowing family member's albuterol inhaler, working well.         Social History     Tobacco Use     Smoking status: Former Smoker     Quit date: 10/27/2020     Years since quittin.3     Smokeless tobacco: Never Used   Substance Use Topics     Alcohol use: No     Drug use: No     PHQ 2021   PHQ-9 Total Score 16 17 8   Q9: Thoughts of better off dead/self-harm past 2 weeks Not at all Not at all Not at all     KEISHA-7 SCORE 12/10/2019 2021 2022   Total Score 15 19 9     Last PHQ-9 2022   1.  Little interest or pleasure in doing things 2   2.  Feeling down, depressed, or hopeless 0   3.  Trouble falling or staying asleep, or sleeping too much 3   4.  Feeling tired or having little energy 3   5.  Poor appetite or overeating 0   6.  Feeling bad about yourself 0   7.  Trouble concentrating 0   8.  Moving slowly or restless 0   Q9: Thoughts of better off dead/self-harm past 2 weeks 0   PHQ-9 Total Score 8   Difficulty at work, home, or with people -     KEISHA-7  2022   1. Feeling nervous, anxious, or on edge 1   2. Not being able to stop or control worrying 1   3. Worrying too much about different things 0   4. Trouble relaxing 3   5. Being so restless that it is hard to sit still 3   6. Becoming easily annoyed or irritable 1   7. Feeling afraid, as if something awful might happen 0   KEISHA-7 Total Score 9   If you checked any problems, how difficult have they made it for you to do your work, take care of things  at home, or get along with other people? -         Review of Systems   Constitutional, HEENT, cardiovascular, pulmonary, gi and gu systems are negative, except as otherwise noted.      Objective           Vitals:  No vitals were obtained today due to virtual visit.    Physical Exam   GENERAL: Healthy, alert and no distress  EYES: Eyes grossly normal to inspection.  No discharge or erythema, or obvious scleral/conjunctival abnormalities.  RESP: No audible wheeze, cough, or visible cyanosis.  No visible retractions or increased work of breathing.    SKIN: Visible skin clear. No significant rash, abnormal pigmentation or lesions.  NEURO: Cranial nerves grossly intact.  Mentation and speech appropriate for age.  PSYCH: Mentation appears normal, affect normal/bright, judgement and insight intact, normal speech and appearance well-groomed.          Video-Visit Details    Type of service:  Video Visit    Video End Time:730    Originating Location (pt. Location): Home    Distant Location (provider location):  [facility]     Platform used for Video Visit: [employer]

## 2022-02-25 ENCOUNTER — MYC MEDICAL ADVICE (OUTPATIENT)
Dept: FAMILY MEDICINE | Facility: CLINIC | Age: 27
End: 2022-02-25

## 2022-02-25 ENCOUNTER — VIRTUAL VISIT (OUTPATIENT)
Dept: FAMILY MEDICINE | Facility: CLINIC | Age: 27
End: 2022-02-25
Payer: COMMERCIAL

## 2022-02-25 DIAGNOSIS — U07.1 CLINICAL DIAGNOSIS OF COVID-19: Primary | ICD-10-CM

## 2022-02-25 DIAGNOSIS — R05.9 COUGH: ICD-10-CM

## 2022-02-25 DIAGNOSIS — F41.1 GAD (GENERALIZED ANXIETY DISORDER): ICD-10-CM

## 2022-02-25 PROCEDURE — 99214 OFFICE O/P EST MOD 30 MIN: CPT | Mod: 95 | Performed by: NURSE PRACTITIONER

## 2022-02-25 RX ORDER — ALBUTEROL SULFATE 90 UG/1
2 AEROSOL, METERED RESPIRATORY (INHALATION) EVERY 4 HOURS PRN
Qty: 18 G | Refills: 1 | Status: SHIPPED | OUTPATIENT
Start: 2022-02-25 | End: 2023-01-25

## 2022-02-25 ASSESSMENT — ANXIETY QUESTIONNAIRES
3. WORRYING TOO MUCH ABOUT DIFFERENT THINGS: NOT AT ALL
7. FEELING AFRAID AS IF SOMETHING AWFUL MIGHT HAPPEN: NOT AT ALL
6. BECOMING EASILY ANNOYED OR IRRITABLE: SEVERAL DAYS
2. NOT BEING ABLE TO STOP OR CONTROL WORRYING: SEVERAL DAYS
1. FEELING NERVOUS, ANXIOUS, OR ON EDGE: SEVERAL DAYS
GAD7 TOTAL SCORE: 9
5. BEING SO RESTLESS THAT IT IS HARD TO SIT STILL: NEARLY EVERY DAY

## 2022-02-25 ASSESSMENT — PATIENT HEALTH QUESTIONNAIRE - PHQ9
5. POOR APPETITE OR OVEREATING: NEARLY EVERY DAY
SUM OF ALL RESPONSES TO PHQ QUESTIONS 1-9: 8

## 2022-02-25 NOTE — TELEPHONE ENCOUNTER
RN contacted [facility] and spoke with Salvador.    Salvador states the order was billed to a COVID testing plan and not the correct prescription insurance on file. He reprocessed the order to the correct insurance, and he states the insurance requires brand-name Ventolin inhaler, so that has been processed now and pharmacy will fill the order today.     Disp Refills Start End JENNA   albuterol (PROAIR HFA/PROVENTIL HFA/VENTOLIN HFA) 108 (90 Base) MCG/ACT inhaler 18 g 1 2/25/2022  --   Sig - Route: Inhale 2 puffs into the lungs every 4 hours as needed for shortness of breath / dyspnea or wheezing - Inhalation   Sent to pharmacy as: Albuterol Sulfate  (90 Base) MCG/ACT Aerosol Solution (PROAIR HFA/PROVENTIL HFA/VENTOLIN HFA)   Class: E-Prescribe   Notes to Pharmacy: Pharmacy may dispense brand covered by insurance (Proair, or proventil or ventolin or generic albuterol inhaler)     PARTH Layne, RN

## 2022-02-26 ASSESSMENT — ANXIETY QUESTIONNAIRES: GAD7 TOTAL SCORE: 9

## 2022-03-07 ENCOUNTER — MYC MEDICAL ADVICE (OUTPATIENT)
Dept: FAMILY MEDICINE | Facility: CLINIC | Age: 27
End: 2022-03-07
Payer: COMMERCIAL

## 2022-03-07 DIAGNOSIS — F41.9 ANXIETY: ICD-10-CM

## 2022-03-07 RX ORDER — FLUOXETINE 10 MG/1
10 CAPSULE ORAL DAILY
Qty: 30 CAPSULE | Refills: 5 | Status: SHIPPED | OUTPATIENT
Start: 2022-03-07 | End: 2022-08-29

## 2022-03-07 RX ORDER — HYDROXYZINE PAMOATE 25 MG/1
25 CAPSULE ORAL 3 TIMES DAILY PRN
Qty: 30 CAPSULE | Refills: 5 | Status: SHIPPED | OUTPATIENT
Start: 2022-03-07 | End: 2023-06-29

## 2022-03-10 ENCOUNTER — MYC MEDICAL ADVICE (OUTPATIENT)
Dept: FAMILY MEDICINE | Facility: CLINIC | Age: 27
End: 2022-03-10
Payer: COMMERCIAL

## 2022-03-10 NOTE — TELEPHONE ENCOUNTER
To provider: What type of appointment would be appropriate to address this?    Nicolette Mercado BSN, RN

## 2022-05-10 ENCOUNTER — ANCILLARY PROCEDURE (OUTPATIENT)
Dept: GENERAL RADIOLOGY | Facility: CLINIC | Age: 27
End: 2022-05-10
Attending: NURSE PRACTITIONER
Payer: COMMERCIAL

## 2022-05-10 ENCOUNTER — OFFICE VISIT (OUTPATIENT)
Dept: FAMILY MEDICINE | Facility: CLINIC | Age: 27
End: 2022-05-10
Payer: COMMERCIAL

## 2022-05-10 VITALS
BODY MASS INDEX: 27.31 KG/M2 | SYSTOLIC BLOOD PRESSURE: 121 MMHG | WEIGHT: 160 LBS | HEART RATE: 79 BPM | DIASTOLIC BLOOD PRESSURE: 83 MMHG | TEMPERATURE: 98.1 F | HEIGHT: 64 IN | OXYGEN SATURATION: 98 %

## 2022-05-10 DIAGNOSIS — R53.83 FATIGUE, UNSPECIFIED TYPE: ICD-10-CM

## 2022-05-10 DIAGNOSIS — R05.9 COUGH: Primary | ICD-10-CM

## 2022-05-10 DIAGNOSIS — R06.02 SHORTNESS OF BREATH: Primary | ICD-10-CM

## 2022-05-10 DIAGNOSIS — R06.2 WHEEZING: ICD-10-CM

## 2022-05-10 DIAGNOSIS — L65.9 HAIR LOSS: ICD-10-CM

## 2022-05-10 DIAGNOSIS — R06.02 SHORTNESS OF BREATH: ICD-10-CM

## 2022-05-10 DIAGNOSIS — R35.0 URINARY FREQUENCY: ICD-10-CM

## 2022-05-10 LAB
ALBUMIN SERPL-MCNC: 4.2 G/DL (ref 3.4–5)
ALBUMIN UR-MCNC: NEGATIVE MG/DL
ALP SERPL-CCNC: 62 U/L (ref 40–150)
ALT SERPL W P-5'-P-CCNC: 26 U/L (ref 0–50)
ANION GAP SERPL CALCULATED.3IONS-SCNC: 5 MMOL/L (ref 3–14)
APPEARANCE UR: CLEAR
AST SERPL W P-5'-P-CCNC: 13 U/L (ref 0–45)
BASOPHILS # BLD AUTO: 0.1 10E3/UL (ref 0–0.2)
BASOPHILS NFR BLD AUTO: 1 %
BILIRUB SERPL-MCNC: 0.4 MG/DL (ref 0.2–1.3)
BILIRUB UR QL STRIP: NEGATIVE
BUN SERPL-MCNC: 12 MG/DL (ref 7–30)
CALCIUM SERPL-MCNC: 9.3 MG/DL (ref 8.5–10.1)
CHLORIDE BLD-SCNC: 105 MMOL/L (ref 94–109)
CO2 SERPL-SCNC: 28 MMOL/L (ref 20–32)
COLOR UR AUTO: YELLOW
CREAT SERPL-MCNC: 0.65 MG/DL (ref 0.52–1.04)
EOSINOPHIL # BLD AUTO: 0.6 10E3/UL (ref 0–0.7)
EOSINOPHIL NFR BLD AUTO: 9 %
ERYTHROCYTE [DISTWIDTH] IN BLOOD BY AUTOMATED COUNT: 12.6 % (ref 10–15)
FERRITIN SERPL-MCNC: 60 NG/ML (ref 12–150)
GFR SERPL CREATININE-BSD FRML MDRD: >90 ML/MIN/1.73M2
GLUCOSE BLD-MCNC: 89 MG/DL (ref 70–99)
GLUCOSE UR STRIP-MCNC: NEGATIVE MG/DL
HCT VFR BLD AUTO: 42.8 % (ref 35–47)
HGB BLD-MCNC: 14.5 G/DL (ref 11.7–15.7)
HGB UR QL STRIP: NEGATIVE
KETONES UR STRIP-MCNC: NEGATIVE MG/DL
LEUKOCYTE ESTERASE UR QL STRIP: NEGATIVE
LYMPHOCYTES # BLD AUTO: 1.8 10E3/UL (ref 0.8–5.3)
LYMPHOCYTES NFR BLD AUTO: 26 %
MCH RBC QN AUTO: 29.2 PG (ref 26.5–33)
MCHC RBC AUTO-ENTMCNC: 33.9 G/DL (ref 31.5–36.5)
MCV RBC AUTO: 86 FL (ref 78–100)
MONOCYTES # BLD AUTO: 0.4 10E3/UL (ref 0–1.3)
MONOCYTES NFR BLD AUTO: 6 %
NEUTROPHILS # BLD AUTO: 4.1 10E3/UL (ref 1.6–8.3)
NEUTROPHILS NFR BLD AUTO: 58 %
NITRATE UR QL: NEGATIVE
PH UR STRIP: 6 [PH] (ref 5–7)
PLATELET # BLD AUTO: 263 10E3/UL (ref 150–450)
POTASSIUM BLD-SCNC: 4 MMOL/L (ref 3.4–5.3)
PROT SERPL-MCNC: 8 G/DL (ref 6.8–8.8)
RBC # BLD AUTO: 4.97 10E6/UL (ref 3.8–5.2)
SODIUM SERPL-SCNC: 138 MMOL/L (ref 133–144)
SP GR UR STRIP: 1.02 (ref 1–1.03)
TSH SERPL DL<=0.005 MIU/L-ACNC: 1.38 MU/L (ref 0.4–4)
UROBILINOGEN UR STRIP-ACNC: 0.2 E.U./DL
WBC # BLD AUTO: 7 10E3/UL (ref 4–11)

## 2022-05-10 PROCEDURE — 36415 COLL VENOUS BLD VENIPUNCTURE: CPT | Performed by: NURSE PRACTITIONER

## 2022-05-10 PROCEDURE — 99214 OFFICE O/P EST MOD 30 MIN: CPT | Performed by: NURSE PRACTITIONER

## 2022-05-10 PROCEDURE — 82728 ASSAY OF FERRITIN: CPT | Performed by: NURSE PRACTITIONER

## 2022-05-10 PROCEDURE — 80050 GENERAL HEALTH PANEL: CPT | Performed by: NURSE PRACTITIONER

## 2022-05-10 PROCEDURE — 71046 X-RAY EXAM CHEST 2 VIEWS: CPT | Mod: TC | Performed by: RADIOLOGY

## 2022-05-10 PROCEDURE — 81003 URINALYSIS AUTO W/O SCOPE: CPT | Performed by: NURSE PRACTITIONER

## 2022-05-10 RX ORDER — LORATADINE 10 MG/1
10 TABLET ORAL DAILY
COMMUNITY

## 2022-05-10 NOTE — PROGRESS NOTES
"  Assessment & Plan     Shortness of breath  Patient was walked throughout the clinic and her oxygen saturation remained 98% or higher, no low readings were obtained during visit.  I do not know if she is actually having low readings at home or if it is inaccurate device/use, but this was reassuring.  Her CXR is normal.  Lungs clear on exam and breathing is non-labored.  CBC is normal.    We discussed referral for pulmonary function testing provided remaining labs are also normal.    - Comprehensive metabolic panel (BMP + Alb, Alk Phos, ALT, AST, Total. Bili, TP)  - TSH with free T4 reflex  - Ferritin  - XR Chest 2 Views; Future  -- CBC with platelets and differential    Fatigue, unspecified type  CBC normal.   Further plan pending results.   ? Long hauler covid-19, however she reports she covid-19 infection was fairly minor but could be possible  - Comprehensive metabolic panel (BMP + Alb, Alk Phos, ALT, AST, Total. Bili, TP)  - TSH with free T4 reflex  - Ferritin  - XR Chest 2 Views; Future  - CBC with platelets and differential    Hair loss  Further plan pending results.   - Comprehensive metabolic panel (BMP + Alb, Alk Phos, ALT, AST, Total. Bili, TP)  - TSH with free T4 reflex  - Ferritin  - XR Chest 2 Views; Future  - CBC with platelets and differential    Urinary frequency  UA is normal.   - UA Macro with Reflex to Micro and Culture - lab collect       BMI:   Estimated body mass index is 27.46 kg/m  as calculated from the following:    Height as of this encounter: 1.626 m (5' 4\").    Weight as of this encounter: 72.6 kg (160 lb).   Weight management plan: not discussed today        Return in about 4 weeks (around 6/7/2022) for If failure to improve, or sooner if worsening.    Candace Diaz, [facility] CHERIE Mix is a 26 year old who presents for the following health issues     History of Present Illness       Reason for visit:  Breathing issues low o2 levels at home " hair loss fatigue new migraines/headaches and nausea  Symptom onset:  More than a month  Symptoms include:  Listed above  Symptom intensity:  Moderate  Symptom progression:  Staying the same  Had these symptoms before:  No  What makes it worse:  Exercise/sexual activity or working (am a )  What makes it better:  Inhaler helps the lungs but only for a few hours    She eats 2-3 servings of fruits and vegetables daily.She consumes 0 sweetened beverage(s) daily.She exercises with enough effort to increase her heart rate 60 or more minutes per day.  She exercises with enough effort to increase her heart rate 5 days per week.   She is taking medications regularly.       Having several symptoms since March of 2022.  Reports some shortness of breath and low oxygen levels at home. SOB is intermittent and worse with activity.   When feeling SOB at rest, has been 91-92%. A few times when active she noted it was as low as 89%.  Feels short of breath every day but not consistently.  Activities make it worse.  Feels like she is almost having an asthma attacks with sex.  Has albuterol inhaler, helps but not always.  Sometimes some wheezing, not every day.  Denies cough.  Chest feels tight, but no chest pain.   Denies hx of asthma, no hx of smoking. No fever or chills. No palpitations. No hemoptysis. Had covid-19 in January of 2022, was mild by her account.     Other symptoms she has been having include hair loss, nausea, headaches, feeling really tired.   Has lost about 11 lbs. Not intentional. Appetite is decreased.    Having some urinary frequency, no dysuria.   One day she had blurred vision for about 2 hours at work, this was just out of peripheral vision in right eye.  Developedd nausea, vomited.  Had headache. Went home from work and rested, resolved.     Started Prozac in December, 10 mg working. Thinks mood is well controlled.    Denies possibility of pregnancy.  Period is regular.      Review of Systems  "  Constitutional, HEENT, cardiovascular, pulmonary, gi and gu systems are negative, except as otherwise noted.      Objective    /83   Pulse 79   Temp 98.1  F (36.7  C)   Ht 1.626 m (5' 4\")   Wt 72.6 kg (160 lb)   LMP  (LMP Unknown)   SpO2 98%   BMI 27.46 kg/m    Body mass index is 27.46 kg/m .  Physical Exam   GENERAL: healthy, alert and no distress  EYES: Eyes grossly normal to inspection, PERRL and conjunctivae and sclerae normal  NECK: no adenopathy, no asymmetry, masses, or scars and thyroid normal to palpation  RESP: lungs clear to auscultation - no rales, rhonchi or wheezes  CV: regular rate and rhythm, normal S1 S2, no S3 or S4, no murmur, click or rub, no peripheral edema and peripheral pulses strong  ABDOMEN: soft, nontender, no hepatosplenomegaly, no masses and bowel sounds normal  MS: no gross musculoskeletal defects noted, no edema  SKIN: no suspicious lesions or rashes  NEURO: Normal strength and tone, mentation intact and speech normal  PSYCH: mentation appears normal, affect normal/bright    CBC RESULTS: Recent Labs   Lab Test 05/10/22  1134   WBC 7.0   RBC 4.97   HGB 14.5   HCT 42.8   MCV 86   MCH 29.2   MCHC 33.9   RDW 12.6        UA RESULTS:  Recent Labs   Lab Test 05/10/22  1134 07/12/19  1030   COLOR Yellow Yellow   APPEARANCE Clear Clear   URINEGLC Negative Negative   URINEBILI Negative Negative   URINEKETONE Negative Negative   SG 1.020 1.015   UBLD Negative Negative   URINEPH 6.0 5.5   PROTEIN Negative Negative   UROBILINOGEN 0.2 0.2   NITRITE Negative Negative   LEUKEST Negative Negative   RBCU  --  O - 2   WBCU  --  0 - 5       CXR  IMPRESSION: Negative chest.    Remaining labs in process.            "

## 2022-06-16 ENCOUNTER — E-VISIT (OUTPATIENT)
Dept: FAMILY MEDICINE | Facility: CLINIC | Age: 27
End: 2022-06-16
Payer: COMMERCIAL

## 2022-06-16 DIAGNOSIS — U07.1 CLINICAL DIAGNOSIS OF COVID-19: Primary | ICD-10-CM

## 2022-06-16 PROCEDURE — 99421 OL DIG E/M SVC 5-10 MIN: CPT | Performed by: NURSE PRACTITIONER

## 2022-06-16 NOTE — LETTER
June 16, 2022      Maria Del Rosario Barger  [address]  [place] [facility] [place] [address]        To Whom It May Concern:    Maria Del Rosario Barger was seen on 6/16/2022.  Please excuse her through 6/16/2022 due to Covid-19 infection.  Maria Del Rosario may return to work on 6/17/2022.         Sincerely,        Candace Diaz, CNP

## 2022-07-21 ENCOUNTER — E-VISIT (OUTPATIENT)
Dept: FAMILY MEDICINE | Facility: CLINIC | Age: 27
End: 2022-07-21
Payer: COMMERCIAL

## 2022-07-21 DIAGNOSIS — N94.6 DYSMENORRHEA: Primary | ICD-10-CM

## 2022-07-21 PROCEDURE — 99207 PR NON-BILLABLE SERV PER CHARTING: CPT | Performed by: NURSE PRACTITIONER

## 2022-07-21 NOTE — PATIENT INSTRUCTIONS
Thank you for choosing us for your care. I think an in-clinic visit would be best next steps based on your symptoms. I see that you already have an appointment scheduled for 8/4- we can discuss at this time.  I think your issue is more involved than what is intended for an e-visit.  You probably needs labs, ultrasound and can discuss possible medication.  You won t be charged for this eVisit.      Candace Diaz, CNP

## 2022-08-10 NOTE — PROGRESS NOTES
Assessment & Plan     Irregular periods  Suspect ovarian cyst, ? pcos in past will get labs. No hirsuitism.   Likely to obgyn. Wbc normal and pain not acute so less likely appendix   To er with worsening pain or fevers  See patient instructions below for more plan.    - Testosterone Free and Total; Future  - Hemoglobin A1c; Future  - US Pelvic Complete with Transvaginal; Future  - Testosterone Free and Total  - Hemoglobin A1c    RLQ abdominal pain    - CBC with platelets and differential; Future  - Comprehensive metabolic panel (BMP + Alb, Alk Phos, ALT, AST, Total. Bili, TP); Future  - Testosterone Free and Total; Future  - UA with Microscopic reflex to Culture - lab collect; Future  - US Pelvic Complete with Transvaginal; Future  - CBC with platelets and differential  - Comprehensive metabolic panel (BMP + Alb, Alk Phos, ALT, AST, Total. Bili, TP)  - Testosterone Free and Total  - UA with Microscopic reflex to Culture - lab collect  - Urine Microscopic    Patient Instructions   Call imaging to schedule ultrasound  [phone]    I will folllow up with labs and imaging and plan from there        Return in about 1 week (around 8/18/2022) for if not improving.    MENDEZ [facility] CHERIE Mix is a 27 year old accompanied by her Self, presenting for the following health issues:  Abnormal Bleeding Problem      History of Present Illness       Reason for visit:  Scalp irritation and menstrual concerns  Symptom onset:  More than a month  Symptoms include:  Flaking itching red patches on scalp and face. Right side pain inconsistent cycles spotting for 1-2 weeks pelvic pain nausea and headaches fatigue  Symptom intensity:  Moderate  Symptom progression:  Staying the same  Had these symptoms before:  No    She eats 0-1 servings of fruits and vegetables daily.She consumes 0 sweetened beverage(s) daily.She exercises with enough effort to increase her heart rate 30 to  "60 minutes per day.  She exercises with enough effort to increase her heart rate 6 days per week.   She is taking medications regularly.     History of cysts on both ovaries. Last us gave ? Pcos. Did not complete testing for this. No hirsutism.   Has been having irregular cycles. Mostly pain on right side that can correlate with spotting. Has been spotting for months now.     Two children are with her today.     No fevers or vomiting. Pain comes and goes no pain right now had RLQ pain last night.     Urinary frequency since last birth.    Review of Systems   Constitutional, HEENT, cardiovascular, pulmonary, GI, , musculoskeletal, neuro, skin, endocrine and psych systems are negative, except as otherwise noted.      Objective    /85   Pulse 95   Temp 97.7  F (36.5  C) (Tympanic)   Resp 14   Ht 1.6 m (5' 3\")   Wt 75.8 kg (167 lb)   LMP 08/05/2022   SpO2 98%   Breastfeeding No   BMI 29.58 kg/m    Body mass index is 29.58 kg/m .  Physical Exam   GENERAL: alert, no distress and over weight  NECK: no adenopathy, no asymmetry, masses, or scars and thyroid normal to palpation  RESP: lungs clear to auscultation - no rales, rhonchi or wheezes  CV: regular rate and rhythm, normal S1 S2, no S3 or S4, no murmur, click or rub, no peripheral edema and peripheral pulses strong  ABDOMEN: soft, tender RLQ mostly but also some in LLQ, without hepatosplenomegaly or masses and bowel sounds normal  MS: no gross musculoskeletal defects noted, no edema  NEURO: Normal strength and tone, mentation intact and speech normal  PSYCH: mentation appears normal, affect normal/bright    Labs-      Results for orders placed or performed in visit on 08/11/22   Hemoglobin A1c     Status: Normal   Result Value Ref Range    Hemoglobin A1C 4.9 0.0 - 5.6 %   UA with Microscopic reflex to Culture - lab collect     Status: Normal    Specimen: Urine, Midstream   Result Value Ref Range    Color Urine Yellow Colorless, Straw, Light Yellow, " Yellow    Appearance Urine Clear Clear    Glucose Urine Negative Negative mg/dL    Bilirubin Urine Negative Negative    Ketones Urine Negative Negative mg/dL    Specific Gravity Urine 1.010 1.003 - 1.035    Blood Urine Negative Negative    pH Urine 6.0 5.0 - 7.0    Protein Albumin Urine Negative Negative mg/dL    Urobilinogen Urine 0.2 0.2, 1.0 E.U./dL    Nitrite Urine Negative Negative    Leukocyte Esterase Urine Negative Negative   CBC with platelets and differential     Status: None   Result Value Ref Range    WBC Count 6.0 4.0 - 11.0 10e3/uL    RBC Count 4.62 3.80 - 5.20 10e6/uL    Hemoglobin 13.9 11.7 - 15.7 g/dL    Hematocrit 40.0 35.0 - 47.0 %    MCV 87 78 - 100 fL    MCH 30.1 26.5 - 33.0 pg    MCHC 34.8 31.5 - 36.5 g/dL    RDW 12.3 10.0 - 15.0 %    Platelet Count 279 150 - 450 10e3/uL    % Neutrophils 56 %    % Lymphocytes 28 %    % Monocytes 6 %    % Eosinophils 9 %    % Basophils 1 %    Absolute Neutrophils 3.4 1.6 - 8.3 10e3/uL    Absolute Lymphocytes 1.7 0.8 - 5.3 10e3/uL    Absolute Monocytes 0.4 0.0 - 1.3 10e3/uL    Absolute Eosinophils 0.5 0.0 - 0.7 10e3/uL    Absolute Basophils 0.0 0.0 - 0.2 10e3/uL   CBC with platelets and differential     Status: None    Narrative    The following orders were created for panel order CBC with platelets and differential.  Procedure                               Abnormality         Status                     ---------                               -----------         ------                     CBC with platelets and d...[[id number]]                      Final result                 Please view results for these tests on the individual orders.   Testosterone Free and Total     Status: None (In process)    Narrative    The following orders were created for panel order Testosterone Free and Total.  Procedure                               Abnormality         Status                     ---------                               -----------         ------                      Sex Hormone Binding Glob...[[id number]]                      In process                 Testosterone Free and Total[[id number]]                      In process                   Please view results for these tests on the individual orders.           .  ..

## 2022-08-11 ENCOUNTER — OFFICE VISIT (OUTPATIENT)
Dept: FAMILY MEDICINE | Facility: CLINIC | Age: 27
End: 2022-08-11
Payer: COMMERCIAL

## 2022-08-11 VITALS
BODY MASS INDEX: 29.59 KG/M2 | OXYGEN SATURATION: 98 % | HEART RATE: 95 BPM | RESPIRATION RATE: 14 BRPM | DIASTOLIC BLOOD PRESSURE: 85 MMHG | TEMPERATURE: 97.7 F | HEIGHT: 63 IN | WEIGHT: 167 LBS | SYSTOLIC BLOOD PRESSURE: 126 MMHG

## 2022-08-11 DIAGNOSIS — R10.31 RLQ ABDOMINAL PAIN: ICD-10-CM

## 2022-08-11 DIAGNOSIS — N92.6 IRREGULAR PERIODS: Primary | ICD-10-CM

## 2022-08-11 LAB
ALBUMIN SERPL-MCNC: 4.3 G/DL (ref 3.4–5)
ALBUMIN UR-MCNC: NEGATIVE MG/DL
ALP SERPL-CCNC: 62 U/L (ref 40–150)
ALT SERPL W P-5'-P-CCNC: 25 U/L (ref 0–50)
ANION GAP SERPL CALCULATED.3IONS-SCNC: 2 MMOL/L (ref 3–14)
APPEARANCE UR: CLEAR
AST SERPL W P-5'-P-CCNC: 14 U/L (ref 0–45)
BACTERIA #/AREA URNS HPF: ABNORMAL /HPF
BASOPHILS # BLD AUTO: 0 10E3/UL (ref 0–0.2)
BASOPHILS NFR BLD AUTO: 1 %
BILIRUB SERPL-MCNC: 0.4 MG/DL (ref 0.2–1.3)
BILIRUB UR QL STRIP: NEGATIVE
BUN SERPL-MCNC: 10 MG/DL (ref 7–30)
CALCIUM SERPL-MCNC: 9.4 MG/DL (ref 8.5–10.1)
CHLORIDE BLD-SCNC: 108 MMOL/L (ref 94–109)
CO2 SERPL-SCNC: 30 MMOL/L (ref 20–32)
COLOR UR AUTO: YELLOW
CREAT SERPL-MCNC: 0.7 MG/DL (ref 0.52–1.04)
EOSINOPHIL # BLD AUTO: 0.5 10E3/UL (ref 0–0.7)
EOSINOPHIL NFR BLD AUTO: 9 %
ERYTHROCYTE [DISTWIDTH] IN BLOOD BY AUTOMATED COUNT: 12.3 % (ref 10–15)
GFR SERPL CREATININE-BSD FRML MDRD: >90 ML/MIN/1.73M2
GLUCOSE BLD-MCNC: 81 MG/DL (ref 70–99)
GLUCOSE UR STRIP-MCNC: NEGATIVE MG/DL
HBA1C MFR BLD: 4.9 % (ref 0–5.6)
HCT VFR BLD AUTO: 40 % (ref 35–47)
HGB BLD-MCNC: 13.9 G/DL (ref 11.7–15.7)
HGB UR QL STRIP: NEGATIVE
KETONES UR STRIP-MCNC: NEGATIVE MG/DL
LEUKOCYTE ESTERASE UR QL STRIP: NEGATIVE
LYMPHOCYTES # BLD AUTO: 1.7 10E3/UL (ref 0.8–5.3)
LYMPHOCYTES NFR BLD AUTO: 28 %
MCH RBC QN AUTO: 30.1 PG (ref 26.5–33)
MCHC RBC AUTO-ENTMCNC: 34.8 G/DL (ref 31.5–36.5)
MCV RBC AUTO: 87 FL (ref 78–100)
MONOCYTES # BLD AUTO: 0.4 10E3/UL (ref 0–1.3)
MONOCYTES NFR BLD AUTO: 6 %
NEUTROPHILS # BLD AUTO: 3.4 10E3/UL (ref 1.6–8.3)
NEUTROPHILS NFR BLD AUTO: 56 %
NITRATE UR QL: NEGATIVE
PH UR STRIP: 6 [PH] (ref 5–7)
PLATELET # BLD AUTO: 279 10E3/UL (ref 150–450)
POTASSIUM BLD-SCNC: 4.2 MMOL/L (ref 3.4–5.3)
PROT SERPL-MCNC: 7.9 G/DL (ref 6.8–8.8)
RBC # BLD AUTO: 4.62 10E6/UL (ref 3.8–5.2)
RBC #/AREA URNS AUTO: ABNORMAL /HPF
SODIUM SERPL-SCNC: 140 MMOL/L (ref 133–144)
SP GR UR STRIP: 1.01 (ref 1–1.03)
SQUAMOUS #/AREA URNS AUTO: ABNORMAL /LPF
UROBILINOGEN UR STRIP-ACNC: 0.2 E.U./DL
WBC # BLD AUTO: 6 10E3/UL (ref 4–11)
WBC #/AREA URNS AUTO: ABNORMAL /HPF

## 2022-08-11 PROCEDURE — 99214 OFFICE O/P EST MOD 30 MIN: CPT | Performed by: PHYSICIAN ASSISTANT

## 2022-08-11 PROCEDURE — 83036 HEMOGLOBIN GLYCOSYLATED A1C: CPT | Performed by: PHYSICIAN ASSISTANT

## 2022-08-11 PROCEDURE — 85025 COMPLETE CBC W/AUTO DIFF WBC: CPT | Performed by: PHYSICIAN ASSISTANT

## 2022-08-11 PROCEDURE — 84270 ASSAY OF SEX HORMONE GLOBUL: CPT | Performed by: PHYSICIAN ASSISTANT

## 2022-08-11 PROCEDURE — 84403 ASSAY OF TOTAL TESTOSTERONE: CPT | Performed by: PHYSICIAN ASSISTANT

## 2022-08-11 PROCEDURE — 81001 URINALYSIS AUTO W/SCOPE: CPT | Performed by: PHYSICIAN ASSISTANT

## 2022-08-11 PROCEDURE — 36415 COLL VENOUS BLD VENIPUNCTURE: CPT | Performed by: PHYSICIAN ASSISTANT

## 2022-08-11 PROCEDURE — 80053 COMPREHEN METABOLIC PANEL: CPT | Performed by: PHYSICIAN ASSISTANT

## 2022-08-11 NOTE — PATIENT INSTRUCTIONS
Call imaging to schedule ultrasound  [phone]    I will folllow up with labs and imaging and plan from there

## 2022-08-12 LAB — SHBG SERPL-SCNC: 27 NMOL/L (ref 30–135)

## 2022-08-15 LAB
TESTOST FREE SERPL-MCNC: 0.68 NG/DL
TESTOST SERPL-MCNC: 34 NG/DL (ref 8–60)

## 2022-08-15 NOTE — RESULT ENCOUNTER NOTE
Frederic Mix,       Your recent test results are attached, if you have any questions or concerns please feel free to contact me via e-mail or call [phone].  Testosterone is normal.  Anion gap is normal. Our lab readings have been off for a long time for that test so please ignore this.  White and red blood cell counts are normal.  I will follow-up with ultrasound      Sodium and potassium normal. Blood sugar (glucose) normal.  Creatinine and GFR normal, which means kidney function is normal.  Liver function is normal.  A1c shows no diabetes.         It was a pleasure to see you at your recent office visit.      Sincerely,  Althea Barragan PA-C

## 2022-08-16 ENCOUNTER — TELEPHONE (OUTPATIENT)
Dept: OBGYN | Facility: CLINIC | Age: 27
End: 2022-08-16

## 2022-08-16 ENCOUNTER — ANCILLARY PROCEDURE (OUTPATIENT)
Dept: ULTRASOUND IMAGING | Facility: CLINIC | Age: 27
End: 2022-08-16
Attending: PHYSICIAN ASSISTANT
Payer: COMMERCIAL

## 2022-08-16 DIAGNOSIS — R10.31 RLQ ABDOMINAL PAIN: ICD-10-CM

## 2022-08-16 DIAGNOSIS — N92.6 IRREGULAR PERIODS: ICD-10-CM

## 2022-08-16 PROCEDURE — 76856 US EXAM PELVIC COMPLETE: CPT | Mod: TC | Performed by: RADIOLOGY

## 2022-08-16 PROCEDURE — 76830 TRANSVAGINAL US NON-OB: CPT | Mod: TC | Performed by: RADIOLOGY

## 2022-08-16 NOTE — RESULT ENCOUNTER NOTE
Frederic Mix,       Your recent test results are attached, if you have any questions or concerns please feel free to contact me via e-mail or call [phone].  Ultrasound shows several cysts please make a follow up appointment to discuss further with obgyn no referral needed.       It was a pleasure to see you at your recent office visit.      Sincerely,  Althea Barragan PA-C

## 2022-08-16 NOTE — TELEPHONE ENCOUNTER
Reason for Call:  Other appointment    Detailed comments: Patient had an ultrasound on 8/16 and was told to follow up with an OB provider to discuss the findings. Patient is currently scheduled with Lydia on 9/9 but was hoping to be seen sooner since she's anxious about the results. Please call and advise patient regarding an appointment.     Phone Number Patient can be reached at: Cell number on file:    Telephone Information:   Mobile [phone]       Best Time: any time    Can we leave a detailed message on this number? YES    Call taken on 8/16/2022 at 5:05 PM by Tristian Hughes

## 2022-08-17 ENCOUNTER — OFFICE VISIT (OUTPATIENT)
Dept: OBGYN | Facility: CLINIC | Age: 27
End: 2022-08-17
Payer: COMMERCIAL

## 2022-08-17 VITALS
BODY MASS INDEX: 29.94 KG/M2 | SYSTOLIC BLOOD PRESSURE: 118 MMHG | WEIGHT: 169 LBS | OXYGEN SATURATION: 98 % | HEART RATE: 85 BPM | DIASTOLIC BLOOD PRESSURE: 77 MMHG

## 2022-08-17 DIAGNOSIS — E28.2 PCOS (POLYCYSTIC OVARIAN SYNDROME): Primary | ICD-10-CM

## 2022-08-17 PROCEDURE — 99213 OFFICE O/P EST LOW 20 MIN: CPT | Performed by: ADVANCED PRACTICE MIDWIFE

## 2022-08-17 NOTE — TELEPHONE ENCOUNTER
[facility] Baylee Trivedi for today at 8:30.    Althea Gregorio, DEXTER  August 17, 2022

## 2022-08-17 NOTE — PROGRESS NOTES
Maria Del Rosario Barger is a 27 year old who presents to the clinic for review of ultrasound results from yesterday.   Is not using anything for birth control.  Is not necessarily interested in a pregnancy currently.   She reports cycles as irregular somewhere between 20 and 45 days.  Has in excess of 5 cycles per year.       Histories reviewed and updated  Past Medical History:   Diagnosis Date     Atopic rhinitis 2008    Overview:  by history to dogs and cats     Chlamydia      KEISHA (generalized anxiety disorder) 2016     Past Surgical History:   Procedure Laterality Date     COMBINED ESOPHAGOSCOPY, GASTROSCOPY, DUODENOSCOPY (EGD) WITH CO2 INSUFFLATION Bilateral 2016    Procedure: COMBINED ESOPHAGOSCOPY, GASTROSCOPY, DUODENOSCOPY (EGD) WITH CO2 INSUFFLATION;  Surgeon: Warren Fernandez MD;  Location: [facility] OR     ESOPHAGOSCOPY, GASTROSCOPY, DUODENOSCOPY (EGD), COMBINED N/A 2016    Procedure: COMBINED ESOPHAGOSCOPY, GASTROSCOPY, DUODENOSCOPY (EGD), BIOPSY SINGLE OR MULTIPLE;  Surgeon: Warren Fernandez MD;  Location: [facility] OR     LAPAROSCOPIC CHOLECYSTECTOMY       TONSILLECTOMY       trigger thumb Right      Social History     Socioeconomic History     Marital status: Single     Spouse name: darek Crews     Number of children: 1     Years of education: Not on file     Highest education level: Not on file   Occupational History     Occupation: /   Tobacco Use     Smoking status: Former Smoker     Quit date: 10/27/2020     Years since quittin.8     Smokeless tobacco: Never Used   Substance and Sexual Activity     Alcohol use: No     Drug use: No     Sexual activity: Yes     Partners: Male   Other Topics Concern     Parent/sibling w/ CABG, MI or angioplasty before 65F 55M? Not Asked   Social History Narrative     Not on file     Social Determinants of Health     Financial Resource Strain: Not on file   Food Insecurity: Not on file   Transportation Needs: Not on file    Physical Activity: Not on file   Stress: Not on file   Social Connections: Not on file   Intimate Partner Violence: Not on file   Housing Stability: Not on file     No family history on file.       ROS: 10 point ROS neg other than the symptoms noted above in the HPI.    EXAM:  /77 (BP Location: Right arm, Cuff Size: Adult Regular)   Pulse 85   Wt 76.7 kg (169 lb)   LMP 08/05/2022   SpO2 98%   BMI 29.94 kg/m          ASSESSMENT/PLAN:    (E28.2) PCOS (polycystic ovarian syndrome)  (primary encounter diagnosis)  Comment:   Plan:       Labs were reviewed in Epic  and the results were normal testosterone    Imaging was reviewed in Epic. And the results were positive for PCOS both on the recent scan from yesterday and one from two years ago in which the possibility of PCOS was discussed      20 minutes spent on the date of the encounter doing chart review, review of test results, patient visit and documentation       We discussed PCOS how it is diagnosed, why her periods are irregular, risks associated with PCOS and recommended treatments.   RTC prn if she wants to normalize her cycles or wants to get pregnant and is unable.   Baylee Trivedi, APRN, CNM

## 2022-08-26 ENCOUNTER — MYC MEDICAL ADVICE (OUTPATIENT)
Dept: FAMILY MEDICINE | Facility: CLINIC | Age: 27
End: 2022-08-26

## 2022-08-29 DIAGNOSIS — F41.9 ANXIETY: ICD-10-CM

## 2022-08-29 RX ORDER — FLUOXETINE 10 MG/1
CAPSULE ORAL
Qty: 30 CAPSULE | Refills: 5 | Status: SHIPPED | OUTPATIENT
Start: 2022-08-29 | End: 2022-11-17

## 2022-09-21 ENCOUNTER — TELEPHONE (OUTPATIENT)
Dept: INTERNAL MEDICINE | Facility: CLINIC | Age: 27
End: 2022-09-21

## 2022-09-21 NOTE — TELEPHONE ENCOUNTER
September 21, 2022  The envelope, with patient's [employer] Red Card was not picked up from the . Mailing card to patient's home address.  Felicia Orr [facility]  2nd Floor  Primary Care

## 2022-10-16 ENCOUNTER — MYC MEDICAL ADVICE (OUTPATIENT)
Dept: FAMILY MEDICINE | Facility: CLINIC | Age: 27
End: 2022-10-16

## 2022-10-17 ENCOUNTER — E-VISIT (OUTPATIENT)
Dept: FAMILY MEDICINE | Facility: CLINIC | Age: 27
End: 2022-10-17
Payer: COMMERCIAL

## 2022-10-17 DIAGNOSIS — N92.6 MISSED PERIOD: Primary | ICD-10-CM

## 2022-10-17 PROCEDURE — 99421 OL DIG E/M SVC 5-10 MIN: CPT | Performed by: FAMILY MEDICINE

## 2022-10-19 NOTE — PATIENT INSTRUCTIONS
Thank you for choosing us for your care. Given your symptoms, I would like you to do a lab-only visit to determine what is causing them.  I have placed the orders.  Please schedule an appointment with the lab right here in [place], or call [phone].  I will let you know when the results are back and next steps to take.

## 2022-10-23 ENCOUNTER — LAB (OUTPATIENT)
Dept: LAB | Facility: CLINIC | Age: 27
End: 2022-10-23
Payer: COMMERCIAL

## 2022-10-23 DIAGNOSIS — N92.6 MISSED PERIOD: ICD-10-CM

## 2022-10-23 PROCEDURE — 36415 COLL VENOUS BLD VENIPUNCTURE: CPT

## 2022-10-23 PROCEDURE — 84702 CHORIONIC GONADOTROPIN TEST: CPT

## 2022-10-24 LAB — B-HCG SERPL-ACNC: <1 IU/L (ref 0–5)

## 2022-10-27 ENCOUNTER — E-VISIT (OUTPATIENT)
Dept: FAMILY MEDICINE | Facility: CLINIC | Age: 27
End: 2022-10-27
Payer: COMMERCIAL

## 2022-10-27 DIAGNOSIS — R05.1 ACUTE COUGH: Primary | ICD-10-CM

## 2022-10-27 PROCEDURE — 99207 PR NO BILLABLE SERVICE THIS VISIT: CPT | Performed by: NURSE PRACTITIONER

## 2022-10-27 NOTE — PATIENT INSTRUCTIONS
Thank you for choosing us for your care. Based on the information provided, I believe you need to be seen immediately.  Please go urgent care as soon as possible.  You should have an in-person exam and may need lab testing such as covid, strep and flu, possibly a chest xray.       You will not be charged for this eVisit.    Candace Diaz, CNP

## 2022-11-01 ENCOUNTER — MYC MEDICAL ADVICE (OUTPATIENT)
Dept: FAMILY MEDICINE | Facility: CLINIC | Age: 27
End: 2022-11-01

## 2022-11-02 ENCOUNTER — E-VISIT (OUTPATIENT)
Dept: FAMILY MEDICINE | Facility: CLINIC | Age: 27
End: 2022-11-02
Payer: COMMERCIAL

## 2022-11-02 DIAGNOSIS — J01.90 ACUTE BACTERIAL SINUSITIS: Primary | ICD-10-CM

## 2022-11-02 DIAGNOSIS — B96.89 ACUTE BACTERIAL SINUSITIS: Primary | ICD-10-CM

## 2022-11-02 PROCEDURE — 99421 OL DIG E/M SVC 5-10 MIN: CPT | Performed by: NURSE PRACTITIONER

## 2022-11-02 NOTE — TELEPHONE ENCOUNTER
Please have patient schedule a visit for this.  I have not seen her for this, was need in urgent care.  Can be E-visit. Candace Diaz, CNP

## 2022-11-02 NOTE — PATIENT INSTRUCTIONS
Sinusitis (Antibiotic Treatment)    The sinuses are air-filled spaces within the bones of the face. They connect to the inside of the nose. Sinusitis is an inflammation of the tissue that lines the sinuses. Sinusitis can occur during a cold. It can also happen due to allergies to pollens and other particles in the air. Sinusitis can cause symptoms of sinus congestion and a feeling of fullness. A sinus infection causes fever, headache, and facial pain. There is often green or yellow fluid draining from the nose or into the back of the throat (post-nasal drip). You have been given antibiotics to treat this condition.   Home care    Take the full course of antibiotics as instructed. Don't stop taking them, even when you feel better.    Drink plenty of water, hot tea, and other liquids as directed by the healthcare provider. This may help thin nasal mucus. It also may help your sinuses drain fluids.    Heat may help soothe painful areas of your face. Use a towel soaked in hot water. Or,  the shower and direct the warm spray onto your face. Using a vaporizer along with a menthol rub at night may also help soothe symptoms.     An expectorant with guaifenesin may help thin nasal mucus and help your sinuses drain fluids. Talk with your provider or pharmacists before taking an over-the-counter (OTC) medicine if you have any questions about it or its side effects..    You can use an OTC decongestant, unless a similar medicine was prescribed to you. Nasal sprays work the fastest. Use one that contains phenylephrine or oxymetazoline. First blow your nose gently. Then use the spray. Don't use these medicines more often than directed on the label. If you do, your symptoms may get worse. You may also take pills that contain pseudoephedrine. Don t use products that combine multiple medicines. This is because side effects may be increased. Read labels. You can also ask the pharmacist for help. (People with high blood  pressure should not use decongestants. They can raise blood pressure.) Talk with your provider or pharmacist if you have any questions about the medicine..    OTC antihistamines may help if allergies contributed to your sinusitis. Talk with your provider or pharmacist if you have any questions about the medicine..    Don't use nasal rinses or irrigation during an acute sinus infection, unless your healthcare provider tells you to. Rinsing may spread the infection to other areas in your sinuses.    Use acetaminophen or ibuprofen to control pain, unless another pain medicine was prescribed to you. If you have chronic liver or kidney disease or ever had a stomach ulcer, talk with your healthcare provider before using these medicines. Never give aspirin to anyone under age 18 who is ill with a fever. It may cause severe liver damage.    Don't smoke. This can make symptoms worse.    Follow-up care  Follow up with your healthcare provider, or as advised.   When to seek medical advice  Call your healthcare provider if any of these occur:     Facial pain or headache that gets worse    Stiff neck    Unusual drowsiness or confusion    Swelling of your forehead or eyelids    Symptoms don't go away in 10 days    Vision problems, such as blurred or double vision    Fever of 100.4 F (38 C) or higher, or as directed by your healthcare provider  Call 911  Call 911 if any of these occur:     Seizure    Trouble breathing    Feeling dizzy or faint    Fingernails, skin or lips look blue, purple , or gray  Prevention  Here are steps you can take to help prevent an infection:     Keep good hand washing habits.    Don t have close contact with people who have sore throats, colds, or other upper respiratory infections.    Don t smoke, and stay away from secondhand smoke.    Stay up to date with of your vaccines.  [employer] last reviewed this educational content on 12/1/2019 2000-2021 [employer]. All rights reserved. This  information is not intended as a substitute for professional medical care. Always follow your healthcare professional's instructions.        Dear Maria Del Rosario Barger    After reviewing your responses, I've been able to diagnose you with?a sinus infection caused by bacteria.?     Based on your responses and diagnosis, I have prescribed Augmentin to treat your symptoms. I have sent this to your pharmacy.?     It is also important to stay well hydrated, get lots of rest and take over-the-counter decongestants,?tylenol?or ibuprofen if you?are able to?take those medications per your primary care provider to help relieve discomfort.?     It is important that you take?all of?your prescribed medication even if your symptoms are improving after a few doses.? Taking?all of?your medicine helps prevent the symptoms from returning.?     If your symptoms worsen, you develop severe headache, vomiting, high fever (>102), or are not improving in 7 days, please contact your primary care provider for an appointment or visit any of our convenient Walk-in Care or Urgent Care Centers to be seen which can be found on our website?here.?     Thanks again for choosing?us?as your health care partner,?   ?  Candace Diaz, ARSALAN?

## 2022-11-17 ENCOUNTER — OFFICE VISIT (OUTPATIENT)
Dept: FAMILY MEDICINE | Facility: CLINIC | Age: 27
End: 2022-11-17
Payer: COMMERCIAL

## 2022-11-17 VITALS
BODY MASS INDEX: 30.12 KG/M2 | SYSTOLIC BLOOD PRESSURE: 118 MMHG | HEIGHT: 63 IN | TEMPERATURE: 98.2 F | HEART RATE: 76 BPM | OXYGEN SATURATION: 96 % | WEIGHT: 170 LBS | DIASTOLIC BLOOD PRESSURE: 79 MMHG

## 2022-11-17 DIAGNOSIS — E28.2 PCOS (POLYCYSTIC OVARIAN SYNDROME): ICD-10-CM

## 2022-11-17 DIAGNOSIS — Z30.09 GENERAL COUNSELING FOR PRESCRIPTION OF ORAL CONTRACEPTIVES: ICD-10-CM

## 2022-11-17 DIAGNOSIS — L65.9 HAIR LOSS: Primary | ICD-10-CM

## 2022-11-17 DIAGNOSIS — F41.1 GAD (GENERALIZED ANXIETY DISORDER): ICD-10-CM

## 2022-11-17 PROCEDURE — 99214 OFFICE O/P EST MOD 30 MIN: CPT | Performed by: NURSE PRACTITIONER

## 2022-11-17 RX ORDER — ESCITALOPRAM OXALATE 10 MG/1
10 TABLET ORAL DAILY
Qty: 90 TABLET | Refills: 3 | Status: SHIPPED | OUTPATIENT
Start: 2022-11-17 | End: 2023-01-15

## 2022-11-17 RX ORDER — LEVONORGESTREL AND ETHINYL ESTRADIOL 0.15-0.03
1 KIT ORAL DAILY
Qty: 84 TABLET | Refills: 3 | Status: SHIPPED | OUTPATIENT
Start: 2022-11-17

## 2022-11-17 ASSESSMENT — PAIN SCALES - GENERAL: PAINLEVEL: NO PAIN (0)

## 2022-11-17 NOTE — PATIENT INSTRUCTIONS
Stop Prozac.  Start lexapro 10 mg daily.     Referral to dermatology for hair loss.   Keep taking vitamins.  Can try topical Rogaine for Women.  I have personally found [employer]'anza brand (healing volume thickening) shampoo and conditioner helpful, something you could try.      Restart birth control pill.  I sent in the same brand you were on in the past

## 2022-11-17 NOTE — PROGRESS NOTES
"  Assessment & Plan     Hair loss  Labs unremarkable.   Discussed continuing her OTC supplements.  Can try Rogaine Women topical treatment.  Changing her ssri, although I think this is unlikely to be related, not listed as common side effect.     Referral to dermatology.   - Adult Dermatology Referral; Future    KEISHA (generalized anxiety disorder)  Stable, but would like to stop Prozac since hair loss seemed to start after she began medication.  Stop Prozac.  Start Lexapro.  Follow-up in 6 months, sooner if concerns.     - escitalopram (LEXAPRO) 10 MG tablet; Take 1 tablet (10 mg) by mouth daily    PCOS (polycystic ovarian syndrome)  She would like to restart OCP.  Was on Nordette in the past and tolerated.  Denies contraindications.    - levonorgestrel-ethinyl estradiol (NORDETTE) 0.15-30 MG-MCG tablet; Take 1 tablet by mouth daily    General counseling for prescription of oral contraceptives  - levonorgestrel-ethinyl estradiol (NORDETTE) 0.15-30 MG-MCG tablet; Take 1 tablet by mouth daily     BMI:   Estimated body mass index is 30.11 kg/m  as calculated from the following:    Height as of this encounter: 1.6 m (5' 3\").    Weight as of this encounter: 77.1 kg (170 lb).   Weight management plan: not discussed        Return in about 4 weeks (around 12/15/2022) for If failure to improve, or sooner if worsening.    Candace Diaz, [facility] CHERIE Mix is a 27 year old, presenting for the following health issues:  Hair Loss      History of Present Illness       Reason for visit:  Medication change or other for hair loss and discuss bc    She eats 2-3 servings of fruits and vegetables daily.She consumes 0 sweetened beverage(s) daily.She exercises with enough effort to increase her heart rate 30 to 60 minutes per day.  She exercises with enough effort to increase her heart rate 4 days per week.   She is taking medications regularly.       Here for concern of hair loss. " "  Occurring for approximately 1 year.  She is concerned that it started after she started Prozac in December 2021. She also had covid-19 in January 2022.  We checked labs for hair loss in May of 2022, those were unremarkable.  She has been taking vitamins and has not noticed an improvement in her hair.  She would like to try a different antidepressant.    Would like to restart OCP.  Hx of PCOS and periods can be irregular.         Review of Systems   Constitutional, HEENT, cardiovascular, pulmonary, gi and gu systems are negative, except as otherwise noted.      Objective    /79   Pulse 76   Temp 98.2  F (36.8  C)   Ht 1.6 m (5' 3\")   Wt 77.1 kg (170 lb)   SpO2 96%   BMI 30.11 kg/m    Body mass index is 30.11 kg/m .  Physical Exam   GENERAL: healthy, alert and no distress  EYES: Eyes grossly normal to inspection, PERRL and conjunctivae and sclerae normal  HENT: normal cephalic/atraumatic and mild generalized thinning of hair  RESP: breathing is unlabored  MS: no gross musculoskeletal defects noted, no edema  SKIN: no suspicious lesions or rashes  NEURO: Normal strength and tone, mentation intact and speech normal    Recent Labs   Lab Test 08/11/22  1055 05/10/22  1134    138   POTASSIUM 4.2 4.0   CHLORIDE 108 105   CO2 30 28   ANIONGAP 2* 5   GLC 81 89   BUN 10 12   CR 0.70 0.65   JYOTI 9.4 9.3     Liver Function Studies - Recent Labs   Lab Test 08/11/22  1055   PROTTOTAL 7.9   ALBUMIN 4.3   BILITOTAL 0.4   ALKPHOS 62   AST 14   ALT 25     Ferritin   Date Value Ref Range Status   05/10/2022 60 12 - 150 ng/mL Final     TSH   Date Value Ref Range Status   05/10/2022 1.38 0.40 - 4.00 mU/L Final   09/22/2020 1.40 0.40 - 4.00 mU/L Final     CBC RESULTS: Recent Labs   Lab Test 08/11/22  1055   WBC 6.0   RBC 4.62   HGB 13.9   HCT 40.0   MCV 87   MCH 30.1   MCHC 34.8   RDW 12.3                  "

## 2023-01-07 ENCOUNTER — HEALTH MAINTENANCE LETTER (OUTPATIENT)
Age: 28
End: 2023-01-07

## 2023-01-14 ENCOUNTER — E-VISIT (OUTPATIENT)
Dept: FAMILY MEDICINE | Facility: CLINIC | Age: 28
End: 2023-01-14
Payer: COMMERCIAL

## 2023-01-14 DIAGNOSIS — F41.1 GAD (GENERALIZED ANXIETY DISORDER): ICD-10-CM

## 2023-01-14 DIAGNOSIS — F33.2 SEVERE EPISODE OF RECURRENT MAJOR DEPRESSIVE DISORDER, WITHOUT PSYCHOTIC FEATURES (H): Primary | ICD-10-CM

## 2023-01-14 PROCEDURE — 99421 OL DIG E/M SVC 5-10 MIN: CPT | Performed by: NURSE PRACTITIONER

## 2023-01-14 ASSESSMENT — PATIENT HEALTH QUESTIONNAIRE - PHQ9
10. IF YOU CHECKED OFF ANY PROBLEMS, HOW DIFFICULT HAVE THESE PROBLEMS MADE IT FOR YOU TO DO YOUR WORK, TAKE CARE OF THINGS AT HOME, OR GET ALONG WITH OTHER PEOPLE: VERY DIFFICULT
SUM OF ALL RESPONSES TO PHQ QUESTIONS 1-9: 22
SUM OF ALL RESPONSES TO PHQ QUESTIONS 1-9: 22

## 2023-01-14 ASSESSMENT — ANXIETY QUESTIONNAIRES
3. WORRYING TOO MUCH ABOUT DIFFERENT THINGS: NEARLY EVERY DAY
GAD7 TOTAL SCORE: 17
4. TROUBLE RELAXING: NEARLY EVERY DAY
8. IF YOU CHECKED OFF ANY PROBLEMS, HOW DIFFICULT HAVE THESE MADE IT FOR YOU TO DO YOUR WORK, TAKE CARE OF THINGS AT HOME, OR GET ALONG WITH OTHER PEOPLE?: SOMEWHAT DIFFICULT
7. FEELING AFRAID AS IF SOMETHING AWFUL MIGHT HAPPEN: SEVERAL DAYS
GAD7 TOTAL SCORE: 17
1. FEELING NERVOUS, ANXIOUS, OR ON EDGE: NEARLY EVERY DAY
5. BEING SO RESTLESS THAT IT IS HARD TO SIT STILL: SEVERAL DAYS
7. FEELING AFRAID AS IF SOMETHING AWFUL MIGHT HAPPEN: SEVERAL DAYS
2. NOT BEING ABLE TO STOP OR CONTROL WORRYING: NEARLY EVERY DAY
6. BECOMING EASILY ANNOYED OR IRRITABLE: NEARLY EVERY DAY
GAD7 TOTAL SCORE: 17

## 2023-01-15 RX ORDER — ESCITALOPRAM OXALATE 20 MG/1
20 TABLET ORAL DAILY
Qty: 90 TABLET | Refills: 1 | Status: SHIPPED | OUTPATIENT
Start: 2023-01-15 | End: 2023-05-24

## 2023-01-15 ASSESSMENT — PATIENT HEALTH QUESTIONNAIRE - PHQ9: SUM OF ALL RESPONSES TO PHQ QUESTIONS 1-9: 22

## 2023-01-15 ASSESSMENT — ANXIETY QUESTIONNAIRES: GAD7 TOTAL SCORE: 17

## 2023-01-15 NOTE — PATIENT INSTRUCTIONS
Depression: Tips to Help Yourself    As your healthcare providers help treat your depression, you can also help yourself. Keep in mind that your illness affects you emotionally, physically, mentally, and socially. So full recovery will take time. Take care of your body and your soul, and be patient with yourself as you get better.  Self-care    Educate yourself. Read about treatment and medicine options. If you have the energy, attend local conferences or support groups. Keep a list of useful websites and helpful books and use them as needed. This illness is not your fault. Don t blame yourself for your depression.    Manage early symptoms. If you notice symptoms returning, experience triggers, or identify other factors that may lead to a depressive episode, get help as soon as possible. Ask trusted friends and family to monitor your behavior and let you know if they see anything of concern.    Work with your provider. Find a provider you can trust. Communicate honestly with that person and share information on your treatment for depression and your reaction to medicines.    Be prepared for a crisis. Know what to do if you experience a crisis. Keep the phone number of a crisis hotline and know the location of your community's urgent care centers and the closest emergency department.    Hold off on big decisions. Depression can cloud your judgment. So wait until you feel better before making major life decisions, such as changing jobs, moving, or getting  or .    Be patient. Recovering from depression is a process. Don t be discouraged if it takes some time to feel better.    Keep it simple. Depression saps your energy and concentration. So you won t be able to do all the things you used to do. Set small goals and do what you can.    Be with others. Don t isolate yourself--you ll only feel worse. Try to be with other people. And take part in fun activities when you can. Go to a movie, ballgame,  [employer] service, or social event. Talk openly with people you can trust. And accept help when it s offered.    Take care of your body  People with depression often lose the desire to take care of themselves. That only makes their problems worse. During treatment and afterward, make a point to:    Exercise. It s a great way to take care of your body. And studies have shown that exercise helps fight depression. Aim for 30 minutes of moderate activity a day. Walking in small blocks of time (5-10 minutes) is a good way to start, but anything that gets you moving (gardening, house cleaning) counts.    Don't use drugs and alcohol. These may ease the pain in the short term. But they ll only make your problems worse in the long run.    Get relief from stress. Ask your healthcare provider for relaxation exercises and techniques to help relieve stress. Consider activities like meditation, yoga, or Jose Chi.    Eat right. A balanced and healthy diet helps keep your body healthy.    Get adequate sleep. Aim for 8 hours per night. Too much or too little sleep can cause other physical and emotional problems.  [employer] last reviewed this educational content on 12/1/2019 2000-2021 [employer]. All rights reserved. This information is not intended as a substitute for professional medical care. Always follow your healthcare professional's instructions.

## 2023-01-22 ENCOUNTER — OFFICE VISIT (OUTPATIENT)
Dept: URGENT CARE | Facility: URGENT CARE | Age: 28
End: 2023-01-22
Payer: COMMERCIAL

## 2023-01-22 VITALS
SYSTOLIC BLOOD PRESSURE: 116 MMHG | BODY MASS INDEX: 29.94 KG/M2 | TEMPERATURE: 98.4 F | WEIGHT: 169 LBS | HEART RATE: 87 BPM | OXYGEN SATURATION: 98 % | RESPIRATION RATE: 16 BRPM | DIASTOLIC BLOOD PRESSURE: 72 MMHG

## 2023-01-22 DIAGNOSIS — J10.1 INFLUENZA B: ICD-10-CM

## 2023-01-22 DIAGNOSIS — J02.0 STREPTOCOCCAL PHARYNGITIS: Primary | ICD-10-CM

## 2023-01-22 LAB
DEPRECATED S PYO AG THROAT QL EIA: POSITIVE
FLUAV AG SPEC QL IA: NEGATIVE
FLUBV AG SPEC QL IA: POSITIVE

## 2023-01-22 PROCEDURE — U0005 INFEC AGEN DETEC AMPLI PROBE: HCPCS

## 2023-01-22 PROCEDURE — 87804 INFLUENZA ASSAY W/OPTIC: CPT

## 2023-01-22 PROCEDURE — U0003 INFECTIOUS AGENT DETECTION BY NUCLEIC ACID (DNA OR RNA); SEVERE ACUTE RESPIRATORY SYNDROME CORONAVIRUS 2 (SARS-COV-2) (CORONAVIRUS DISEASE [COVID-19]), AMPLIFIED PROBE TECHNIQUE, MAKING USE OF HIGH THROUGHPUT TECHNOLOGIES AS DESCRIBED BY CMS-2020-01-R: HCPCS

## 2023-01-22 PROCEDURE — 87880 STREP A ASSAY W/OPTIC: CPT

## 2023-01-22 PROCEDURE — 99214 OFFICE O/P EST MOD 30 MIN: CPT | Mod: CS

## 2023-01-22 RX ORDER — AMOXICILLIN 500 MG/1
500 CAPSULE ORAL 2 TIMES DAILY
Qty: 20 CAPSULE | Refills: 0 | Status: SHIPPED | OUTPATIENT
Start: 2023-01-22 | End: 2023-02-01

## 2023-01-22 NOTE — PROGRESS NOTES
URGENT CARE  Assessment & Plan   Assessment:   Maria Del Rosario Ward is a 27 year old female who's clinical presentation today is consistent with:   1. Streptococcal pharyngitis  - Streptococcus A Rapid Screen w/Reflex to PCR - Clinic Collect  - amoxicillin (AMOXIL) 500 MG capsule; Take 1 capsule (500 mg) by mouth 2 times daily for 10 days  Dispense: 20 capsule; Refill: 0    2. Influenza B  - Influenza A & B Antigen - Clinic Collect  - Symptomatic COVID-19 Virus (Coronavirus) by PCR Nose    No alarm signs or symptoms present   Differential Diagnoses for this patient's CC include   Bacterial vs viral etiology of pharyngitis   peritonsillar abscess, Tonsillitis, mono   Plan:  Will treat patient with supportive and symptomatic measures for pharyngitis at this time which include: Fluids, rest, over-the-counter medications; decongestants, antihistamines, and expectorants, side effects of medications reviewed. Educated patient that honey, warm fluids and gargling saltwater can also help  additionally tested for bacterial cause and test was positive for streptococcal A, an antibiotic prescription was supplied to the pharmacy, side effects of medications reviewed. Additionally we discussed if symptoms do not improve after starting today's treatment (or if symptoms worsen) to follow up in 5-7 days.     Patient  is  agreeable to treatment plan and state they will follow-up if symptoms do not improve and/or if symptoms worsen (see patient's AVS 'monitor for' section for specific patient instructions given and discussed regarding what to watch for and when to follow up)    Medications ordered are listed above, please see AVS for patient's specific and personalized discharge instructions given     MAGED Doss [facility] [place]      ______________________________________________________________________        Subjective  Subjective     HPI: Maria Del Rosario Ward  is a 27 year old  female who presents today  for evaluation the following concerns:   Patient endorses flu like symptoms today which started 2 days ago on 1/20/23   Patient reports sore throat, headache, body aches, fevers and ear pain.    Patient denies any  wheezing, shortness of breath, difficulty breathing, chest pain, weakness or signs of dehydration   additionally patient denies a history of asthma or any other past medical history of breathing conditions.       Allergies   Allergen Reactions     Sulfa Drugs      Tobramycin Swelling     Eyes swelled     Topamax [Topiramate]      Patient Active Problem List   Diagnosis     KEISHA (generalized anxiety disorder)     Family history of von Willebrand disease     Family history of factor V deficiency     Low vitamin D level     Atopic rhinitis     Cysts of both ovaries     PCOS (polycystic ovarian syndrome)       Review of Systems:  Pertinent review of systems as reflected in HPI, otherwise negative.     Objective  Objective    Physical Exam:  Vitals:    01/22/23 0942   BP: 116/72   Pulse: 87   Resp: 16   Temp: 98.4  F (36.9  C)   SpO2: 98%   Weight: 76.7 kg (169 lb)        General: Alert and oriented, no acute distress, Vital signs reviewed: afebrile,  normotensive   Psy/mental status: Cooperative, nonanxious  SKIN: Intact, no rashes  EYES: EOMs intact, PERRLA bilaterally   Conjunctiva: Clear bilaterally, no injection or erythema present  EARS: TMs intact, translucent gray in color with normal landmarks present no erythema  or bulging tympanic membrane   Canals are without swelling, however have a mild amount of cerumen, no impaction  NOSE:  mucosa erythematous bilaterally with a mild amount of rhinorrhea, clear  discharge}               No frontal or maxillary sinus tenderness present bilaterally  MOUTH/THROAT: lips, tongue, & oral mucosa appear normal upon inspection                Posterior oropharynx is erythematous but without exudate, lesions or tonsillar  Edema, no dysphonia   NECK: supple, has full range  of motion with no meningeal signs              No lymphadenopathy present  LUNG: normal work of breathing, good respiratory effort without retractions, good air  movement, non labored, inspection reveals normal chest expansion w/  inspiration            Lung sounds are clear to auscultation bilaterally,            No rales/rhonic/crackles wheezing noted           No cough noted       LABS:   Results for orders placed or performed in visit on 01/22/23   Streptococcus A Rapid Screen w/Reflex to PCR - Clinic Collect     Status: Abnormal    Specimen: Throat; Swab   Result Value Ref Range    Group A Strep antigen Positive (A) Negative   Influenza A & B Antigen - Clinic Collect     Status: Abnormal    Specimen: Nose; Swab   Result Value Ref Range    Influenza A antigen Negative Negative    Influenza B antigen Positive (A) Negative    Narrative    Test results must be correlated with clinical data. If necessary, results should be confirmed by a molecular assay or viral culture.           I explained my diagnostic considerations and recommendations to the patient, who voiced understanding and agreement with the treatment plan.   All questions were answered.   We discussed potential side effects, risks and benefits of any prescribed or recommended therapies, as well as expectations for response to treatments.  Please see AVS for any patient instructions & handouts given.   Patient was advised to contact the Nurse Care Line, their Primary Care provider, Urgent Care, or the Emergency Department if there are new or worsening symptoms, or call 911 for emergencies.        ______________________________________________________________________          Patient Instructions   Diagnosis:streptococcus pharyngitis    Today we did:  Throat swab - positive      Plan:   POSITIVE:  STREPTOCOCCUS - GROUP A STREP  1. Strep Test today was positive for Streptococcus A  o  and you/child will be contagious for approximately 24 hours following  initiation of treatment (no school or work).   2. Take prescribed antibiotics   o Take w/ food to help prevent stomach upset   o Consider a probiotic to replace good bacteria in GI system and decrease risk of diarrhea   3. Change toothbrush/toothpaste tube 2 -3 days after starting antibiotic treatment.   4. Full recovery can be expected 7-10 days with:  o adequate rest and  fluids     Encourage increased fluid intake.     Older children may prefer ice chips, cold drinks,  1.  frozen desserts, popsicle's,   2. warm chicken soup, or beverages with lemon and honey.     Older children may gargle with warm salt water to ease throat pain.     Have your child spit out the gargle afterward and not swallow.    Tell people who may have had contact with your child about this illness.     This may include school officials,  center workers, and pregnant women.     Wash hands frequently    Any medication(s) can cause allergic reactions:   Amoxicillins are commonly known to cause a red spotty skin rash that is non-itchy - this is not necessarily an allergy or allergic reaction   True Allergies are more consistent with:     swelling in the face, mouth, throat,     having difficulty breathing    Skin reactions, including hives and itching, and flushed or pale skin.     Low blood pressure     Constriction of your airways and a swollen tongue or throat, which can cause     wheezing and trouble breathing    A weak and rapid pulse,     nausea, vomiting     dizziness or fainting      Monitor for:     Fever of 101 F or higher that is not resolving w/ tylenol or ibuprofen     Difficulty breathing: shortness of breath, wheezing, chest pain with breathing     Signs of dehydration: Feeling weak, dizzy or that you might faint    Difficult or painful swallowing that is preventing you from eating or drinking     A skin rash - little red bumps on  your skin   1. Trouble breathing or catching your breath  2. Drooling and problems  swallowing  3. Wheezing  4. Unable to talk  5. Feeling dizzy or faint  6. Feeling of doom

## 2023-01-22 NOTE — PATIENT INSTRUCTIONS
Diagnosis:streptococcus pharyngitis    Today we did:  Throat swab - positive      Plan:   POSITIVE:  STREPTOCOCCUS - GROUP A STREP  Strep Test today was positive for Streptococcus A   and you/child will be contagious for approximately 24 hours following initiation of treatment (no school or work).   Take prescribed antibiotics   Take w/ food to help prevent stomach upset   Consider a probiotic to replace good bacteria in GI system and decrease risk of diarrhea   Change toothbrush/toothpaste tube 2 -3 days after starting antibiotic treatment.   Full recovery can be expected 7-10 days with:  adequate rest and  fluids   Encourage increased fluid intake.   Older children may prefer ice chips, cold drinks,   frozen desserts, popsicle's,   warm chicken soup, or beverages with lemon and honey.   Older children may gargle with warm salt water to ease throat pain.   Have your child spit out the gargle afterward and not swallow.  Tell people who may have had contact with your child about this illness.   This may include school officials,  center workers, and pregnant women.   Wash hands frequently    Any medication(s) can cause allergic reactions:   Amoxicillins are commonly known to cause a red spotty skin rash that is non-itchy - this is not necessarily an allergy or allergic reaction   True Allergies are more consistent with:   swelling in the face, mouth, throat,   having difficulty breathing  Skin reactions, including hives and itching, and flushed or pale skin.   Low blood pressure   Constriction of your airways and a swollen tongue or throat, which can cause   wheezing and trouble breathing  A weak and rapid pulse,   nausea, vomiting   dizziness or fainting      Monitor for:   Fever of 101 F or higher that is not resolving w/ tylenol or ibuprofen   Difficulty breathing: shortness of breath, wheezing, chest pain with breathing   Signs of dehydration: Feeling weak, dizzy or that you might faint  Difficult or  painful swallowing that is preventing you from eating or drinking   A skin rash - little red bumps on  your skin   Trouble breathing or catching your breath  Drooling and problems swallowing  Wheezing  Unable to talk  Feeling dizzy or faint  Feeling of doom

## 2023-01-23 LAB — SARS-COV-2 RNA RESP QL NAA+PROBE: NEGATIVE

## 2023-01-24 DIAGNOSIS — R05.9 COUGH: ICD-10-CM

## 2023-01-24 DIAGNOSIS — U07.1 CLINICAL DIAGNOSIS OF COVID-19: ICD-10-CM

## 2023-01-25 RX ORDER — ALBUTEROL SULFATE 90 UG/1
AEROSOL, METERED RESPIRATORY (INHALATION)
Qty: 18 G | Refills: 1 | Status: SHIPPED | OUTPATIENT
Start: 2023-01-25 | End: 2023-10-16

## 2023-02-15 ENCOUNTER — E-VISIT (OUTPATIENT)
Dept: FAMILY MEDICINE | Facility: CLINIC | Age: 28
End: 2023-02-15
Payer: COMMERCIAL

## 2023-02-15 DIAGNOSIS — F33.2 SEVERE EPISODE OF RECURRENT MAJOR DEPRESSIVE DISORDER, WITHOUT PSYCHOTIC FEATURES (H): ICD-10-CM

## 2023-02-15 DIAGNOSIS — F41.1 GAD (GENERALIZED ANXIETY DISORDER): Primary | ICD-10-CM

## 2023-02-15 PROCEDURE — 99421 OL DIG E/M SVC 5-10 MIN: CPT | Performed by: NURSE PRACTITIONER

## 2023-03-08 ASSESSMENT — ANXIETY QUESTIONNAIRES
1. FEELING NERVOUS, ANXIOUS, OR ON EDGE: SEVERAL DAYS
IF YOU CHECKED OFF ANY PROBLEMS ON THIS QUESTIONNAIRE, HOW DIFFICULT HAVE THESE PROBLEMS MADE IT FOR YOU TO DO YOUR WORK, TAKE CARE OF THINGS AT HOME, OR GET ALONG WITH OTHER PEOPLE: SOMEWHAT DIFFICULT
5. BEING SO RESTLESS THAT IT IS HARD TO SIT STILL: SEVERAL DAYS
7. FEELING AFRAID AS IF SOMETHING AWFUL MIGHT HAPPEN: SEVERAL DAYS
7. FEELING AFRAID AS IF SOMETHING AWFUL MIGHT HAPPEN: SEVERAL DAYS
8. IF YOU CHECKED OFF ANY PROBLEMS, HOW DIFFICULT HAVE THESE MADE IT FOR YOU TO DO YOUR WORK, TAKE CARE OF THINGS AT HOME, OR GET ALONG WITH OTHER PEOPLE?: SOMEWHAT DIFFICULT
2. NOT BEING ABLE TO STOP OR CONTROL WORRYING: SEVERAL DAYS
4. TROUBLE RELAXING: SEVERAL DAYS
GAD7 TOTAL SCORE: 8
GAD7 TOTAL SCORE: 8
3. WORRYING TOO MUCH ABOUT DIFFERENT THINGS: SEVERAL DAYS
6. BECOMING EASILY ANNOYED OR IRRITABLE: MORE THAN HALF THE DAYS

## 2023-03-09 ENCOUNTER — VIRTUAL VISIT (OUTPATIENT)
Dept: PSYCHIATRY | Facility: CLINIC | Age: 28
End: 2023-03-09
Attending: NURSE PRACTITIONER
Payer: COMMERCIAL

## 2023-03-09 DIAGNOSIS — F33.2 SEVERE EPISODE OF RECURRENT MAJOR DEPRESSIVE DISORDER, WITHOUT PSYCHOTIC FEATURES (H): ICD-10-CM

## 2023-03-09 DIAGNOSIS — F41.1 GAD (GENERALIZED ANXIETY DISORDER): ICD-10-CM

## 2023-03-09 DIAGNOSIS — F90.9 ATTENTION DEFICIT HYPERACTIVITY DISORDER (ADHD), UNSPECIFIED ADHD TYPE: Primary | ICD-10-CM

## 2023-03-09 PROCEDURE — 99203 OFFICE O/P NEW LOW 30 MIN: CPT | Mod: VID | Performed by: PSYCHIATRY & NEUROLOGY

## 2023-03-09 NOTE — NURSING NOTE
Is the patient currently in the state of [place]? YES    Visit mode:VIDEO    If the visit is dropped, the patient can be reconnected by: VIDEO VISIT: Text to cell phone:   Telephone Information:   Mobile [phone]       Will anyone else be joining the visit? No  (If patient encounters technical issues they should call [phone])    How would you like to obtain your AVS? MyChart    Are changes needed to the allergy or medication list? NO    Rooming Documentation:     Reason for visit:  Follow Up    ISHMAEL Prabhakar
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
## 2023-03-09 NOTE — Clinical Note
Candace,  Thank you for your consult and care of the patient.  The patient has symptomatology consistent with ADHD and have diagnosed it.  With confirmation of negative urine tox we will initiate Adderall.  Sincerely, Timbo Moore M.D. Consultative Psychiatrist Program Medical Director, [facility]

## 2023-03-13 ENCOUNTER — LAB (OUTPATIENT)
Dept: LAB | Facility: CLINIC | Age: 28
End: 2023-03-13
Payer: COMMERCIAL

## 2023-03-13 DIAGNOSIS — F90.9 ATTENTION DEFICIT HYPERACTIVITY DISORDER (ADHD), UNSPECIFIED ADHD TYPE: Primary | ICD-10-CM

## 2023-03-13 PROCEDURE — 80306 DRUG TEST PRSMV INSTRMNT: CPT

## 2023-03-14 LAB
AMPHETAMINES UR QL: NOT DETECTED
BARBITURATES UR QL SCN: NOT DETECTED
BENZODIAZ UR QL SCN: NOT DETECTED
BUPRENORPHINE UR QL: NOT DETECTED
CANNABINOIDS UR QL: NOT DETECTED
COCAINE UR QL SCN: NOT DETECTED
D-METHAMPHET UR QL: NOT DETECTED
METHADONE UR QL SCN: NOT DETECTED
OPIATES UR QL SCN: NOT DETECTED
OXYCODONE UR QL SCN: NOT DETECTED
PCP UR QL SCN: NOT DETECTED
PROPOXYPH UR QL: NOT DETECTED
TRICYCLICS UR QL SCN: NOT DETECTED

## 2023-03-14 RX ORDER — DEXTROAMPHETAMINE SACCHARATE, AMPHETAMINE ASPARTATE, DEXTROAMPHETAMINE SULFATE AND AMPHETAMINE SULFATE 2.5; 2.5; 2.5; 2.5 MG/1; MG/1; MG/1; MG/1
10 TABLET ORAL 2 TIMES DAILY
Qty: 30 TABLET | Refills: 0 | Status: SHIPPED | OUTPATIENT
Start: 2023-03-14 | End: 2023-03-23

## 2023-03-23 ENCOUNTER — VIRTUAL VISIT (OUTPATIENT)
Dept: PSYCHIATRY | Facility: CLINIC | Age: 28
End: 2023-03-23
Payer: COMMERCIAL

## 2023-03-23 DIAGNOSIS — F90.9 ATTENTION DEFICIT HYPERACTIVITY DISORDER (ADHD), UNSPECIFIED ADHD TYPE: Primary | ICD-10-CM

## 2023-03-23 DIAGNOSIS — F33.1 MAJOR DEPRESSIVE DISORDER, RECURRENT EPISODE, MODERATE (H): ICD-10-CM

## 2023-03-23 PROCEDURE — 99214 OFFICE O/P EST MOD 30 MIN: CPT | Mod: VID | Performed by: PSYCHIATRY & NEUROLOGY

## 2023-03-23 RX ORDER — DEXTROAMPHETAMINE SACCHARATE, AMPHETAMINE ASPARTATE, DEXTROAMPHETAMINE SULFATE AND AMPHETAMINE SULFATE 2.5; 2.5; 2.5; 2.5 MG/1; MG/1; MG/1; MG/1
TABLET ORAL
Qty: 40 TABLET | Refills: 0 | Status: SHIPPED | OUTPATIENT
Start: 2023-03-23 | End: 2023-04-20

## 2023-03-23 RX ORDER — DEXTROAMPHETAMINE SACCHARATE, AMPHETAMINE ASPARTATE MONOHYDRATE, DEXTROAMPHETAMINE SULFATE AND AMPHETAMINE SULFATE 6.25; 6.25; 6.25; 6.25 MG/1; MG/1; MG/1; MG/1
25 CAPSULE, EXTENDED RELEASE ORAL EVERY MORNING
Qty: 30 CAPSULE | Refills: 0 | Status: SHIPPED | OUTPATIENT
Start: 2023-03-23 | End: 2023-04-20

## 2023-03-23 NOTE — PROGRESS NOTES
Virtual Visit Details    Type of service:  Video Visit   Video Start Time: 10:07 AM  Video End Time:10:19 AM    Originating Location (pt. Location): Home    Distant Location (provider location):  Off-site  Platform used for Video Visit: [facility] Psychiatry Consult Note    IDENTIFICATION   Name: Maria Del Rosario Ward   : 1995/27 year old      Sex:    @ female          Telemedicine Visit: The patient's condition can be safely assessed and treated via synchronous audio and visual telemedicine encounter.        Face to Face/patient Contact total time: 12 minutes  Pre Charting time: 1 minutes; Post charting time, communication and other activities: 1 minutes; Total time 14 minutes  10:06 AM          SUBJECTIVE   adderall having efficacy. Side effects - cold hands (resolved now), appetite reduction (persisted). Eating protein shakes and then able to eat dinner. Productivity and daily tasks, self care has all improved. Has to take 2nd dose of 10 mg earlier to have a benefit; no effects or minimal with first 10 mg dose. Lasts through work day from 2nd dose usually 9ish and lasts until 4ish.         PHQ-9 scores:  PHQ-9 SCORE 2021   PHQ-9 Total Score MyChart 17 (Moderately severe depression) - 22 (Severe depression)   PHQ-9 Total Score 17 8 22       KEISHA-7 scores:  KEISHA-7 SCORE 2022 2023 3/8/2023   Total Score - 17 (severe anxiety) 8 (mild anxiety)   Total Score 9 17 8       OBJECTIVE     Vital Signs:   There were no vitals taken for this visit.    Labs:  n/a    Current Medications:  Current Outpatient Medications   Medication     albuterol (PROAIR HFA/PROVENTIL HFA/VENTOLIN HFA) 108 (90 Base) MCG/ACT inhaler     amphetamine-dextroamphetamine (ADDERALL) 10 MG tablet     escitalopram (LEXAPRO) 20 MG tablet     hydrOXYzine (VISTARIL) 25 MG capsule     levonorgestrel-ethinyl estradiol (NORDETTE) 0.15-30 MG-MCG tablet     loratadine (CLARITIN) 10 MG tablet     No current  facility-administered medications for this visit.        The [place] Prescription Monitoring Program has been reviewed and there are no concerns about diversionary activity for controlled substances at this time.        ADDED HISTORY   In the past struggled with post partum depression. Other episodes of significant depression struggles have occurred. There have been times with a bigger struggle including hopelessness.     MENTAL STATUS EXAMINATION:   Appearance: Intact to attention to grooming and hygiene  Attitude: Cooperative  Eye Contact: Fair  Gait and Station: Sitting and standing at separate times  Psychomotor Behavior: Overall slightly active  Oriented to: Grossly person place and time  Attention Span and Concentration: Grossly intact  Speech: Mildly depressed tone  Language: English  Mood:  sad   Affect: Constricted  Associations:  no loose associations  Thought Process:  logical, linear and goal oriented  Thought Content: No evidence of delusions or suicidal or homicidal ideation plan or intent  Memory: Grossly intact  Fund of Knowledge: Grossly intact  Insight:  good  Judgment:  intact, adequate for safety  Impulse Control:  intact        DIAGNOSES:   ADHD, unspecified type  Major Depressive Disorder, moderate, recurrent, in partial remission  Unspecified anxiety disorder  By history PTSD      ASSESSMENT:   Patient with history of anxiety, depression and trauma with reported prior diagnosis of PTSD with sufficient trigger.  Has consistent symptoms pretrauma and from early on that are consistent with ADHD and may not be influenced by depression/anxiety given continuity and chronicity.  Meets criteria for ADHD.    Today Maria Del Rosario Ward reports  no suicidal ideations. In addition, she has notable risk factors for self-harm, including anxiety. However, risk is mitigated by commitment to family and absence of past attempts. Therefore, based on all available evidence including the factors cited above, she does  not appear to be at imminent risk for self-harm, does not meet criteria for a 72-hr hold, and therefore remains appropriate for ongoing outpatient level of care.       PLAN:       Patient advised of consultative model. Patient will continue to be seen for ongoing consultation and stabilization.    Does not meet criteria for involuntary treatment or hospitalization    Adderall 10 mg p.o. twice daily efficacy after second tablet x6 hours, low appetite but manageable => 3/23/2023 XR 25 mg po qam/IR 10-15 mg po qpm prn adhd-Risks, benefits and alternatives discussed.  Patient provides verbal consent to treatment.  Advised of heart effects, discontinue for any heart related effects.  Advised of addiction, insomnia.    Escitalopram 20 mg daily    Hydroxyzine 25 mg p.o. 3 times daily as needed anxiety    Return in 4 weeks         Signed:   Timbo Moore M.D.  [facility] Psychiatry Service    Disclaimer: This note consists of symbols derived from keyboarding, dictation and/or voice recognition software. As a result, there may be errors in the script that have gone undetected. Please consider this when interpreting information found in this chart.

## 2023-03-23 NOTE — NURSING NOTE
Is the patient currently in the state of [place]? YES    Visit mode:VIDEO    If the visit is dropped, the patient can be reconnected by: VIDEO VISIT: Text to cell phone:   Telephone Information:   Mobile [phone]       Will anyone else be joining the visit? No  (If patient encounters technical issues they should call [phone])    How would you like to obtain your AVS? MyChart    Are changes needed to the allergy or medication list? NO    Rooming Documentation:     Reason for visit:  Follow Up    ISHMAEL Prabhakar

## 2023-04-20 ENCOUNTER — VIRTUAL VISIT (OUTPATIENT)
Dept: PSYCHIATRY | Facility: CLINIC | Age: 28
End: 2023-04-20
Payer: COMMERCIAL

## 2023-04-20 DIAGNOSIS — F90.9 ATTENTION DEFICIT HYPERACTIVITY DISORDER (ADHD), UNSPECIFIED ADHD TYPE: ICD-10-CM

## 2023-04-20 PROCEDURE — 99214 OFFICE O/P EST MOD 30 MIN: CPT | Mod: VID | Performed by: PSYCHIATRY & NEUROLOGY

## 2023-04-20 RX ORDER — DEXTROAMPHETAMINE SACCHARATE, AMPHETAMINE ASPARTATE, DEXTROAMPHETAMINE SULFATE AND AMPHETAMINE SULFATE 2.5; 2.5; 2.5; 2.5 MG/1; MG/1; MG/1; MG/1
TABLET ORAL
Qty: 40 TABLET | Refills: 0 | Status: SHIPPED | OUTPATIENT
Start: 2023-04-20 | End: 2023-05-24

## 2023-04-20 RX ORDER — DEXTROAMPHETAMINE SACCHARATE, AMPHETAMINE ASPARTATE MONOHYDRATE, DEXTROAMPHETAMINE SULFATE AND AMPHETAMINE SULFATE 6.25; 6.25; 6.25; 6.25 MG/1; MG/1; MG/1; MG/1
25 CAPSULE, EXTENDED RELEASE ORAL EVERY MORNING
Qty: 30 CAPSULE | Refills: 0 | Status: SHIPPED | OUTPATIENT
Start: 2023-04-20 | End: 2023-08-02

## 2023-04-20 NOTE — NURSING NOTE
Is the patient currently in the state of [place]? YES    Visit mode:VIDEO    If the visit is dropped, the patient can be reconnected by: VIDEO VISIT: Text to cell phone:   Telephone Information:   Mobile [phone]       Will anyone else be joining the visit? No  (If patient encounters technical issues they should call [phone])    How would you like to obtain your AVS? MyChart    Are changes needed to the allergy or medication list? NO    Rooming Documentation: Care team has reviewed attendance agreement with patient. Patient advised that two failed appointments within 6 months may lead to termination of current episode of care.        Reason for visit: Video Visit     ISHMAEL Maharaj

## 2023-04-20 NOTE — PROGRESS NOTES
Virtual Visit Details    Type of service:  Video Visit   Video Start Time: 9:37 AM  Video End Time:10:00 AM    Originating Location (pt. Location): Home  Distant Location (provider location):  Off-site  Platform used for Video Visit: Jacqueline Wilkins  [facility]  Collaborative Care Psychiatry Consult Note    IDENTIFICATION   Name: Maria Del Rosario Ward   : 1995/27 year old      Sex:    @ female          Telemedicine Visit: The patient's condition can be safely assessed and treated via synchronous audio and visual telemedicine encounter.        Face to Face/patient Contact total time: 23 minutes  Pre Charting time: 1 minutes; Post charting time, communication and other activities: 1 minutes; Total time 25 minutes  9:36 AM          SUBJECTIVE   Reports doing good. Appetite limited but side effects have by far resolved. Eating throughout the da. When taking hydroxyzine at night - hard to get up in morning. Was taking two hydroxyzine tablets, but still has the same experience with 25 mg dose of hdyroxyzine. Once she eventually falls asleep can't get up to the alarm. Son will wake her up after taking hydroxyzine the prior night. Feels groggy/exhausted. Daytime dose of hydroxyzine calms her down makes her slightly tired. Tosses and turns and wakes up very early.         PHQ-9 scores:      2021     1:12 PM 2022     7:00 AM 2023     9:31 AM   PHQ-9 SCORE   PHQ-9 Total Score MyChart 17 (Moderately severe depression)  22 (Severe depression)   PHQ-9 Total Score 17 8 22       KEISHA-7 scores:      2022     7:00 AM 2023     9:31 AM 3/8/2023     3:38 PM   KEISHA-7 SCORE   Total Score  17 (severe anxiety) 8 (mild anxiety)   Total Score 9 17 8       OBJECTIVE     Vital Signs:   There were no vitals taken for this visit.    Labs:  n/a    Current Medications:  Current Outpatient Medications   Medication     albuterol (PROAIR HFA/PROVENTIL HFA/VENTOLIN HFA) 108 (90 Base) MCG/ACT inhaler      amphetamine-dextroamphetamine (ADDERALL XR) 25 MG 24 hr capsule     amphetamine-dextroamphetamine (ADDERALL) 10 MG tablet     escitalopram (LEXAPRO) 20 MG tablet     hydrOXYzine (VISTARIL) 25 MG capsule     levonorgestrel-ethinyl estradiol (NORDETTE) 0.15-30 MG-MCG tablet     loratadine (CLARITIN) 10 MG tablet     No current facility-administered medications for this visit.        The [place] Prescription Monitoring Program has been reviewed and there are no concerns about diversionary activity for controlled substances at this time.        ADDED HISTORY   Typically in bed at the latest 9 pm. Tried melatonin 5 mg gummies and not sure if it worked.     MENTAL STATUS EXAMINATION:   Appearance: Intact to attention to grooming and hygiene  Attitude: Cooperative  Eye Contact: Fair  Gait and Station: Sitting and standing at separate times  Psychomotor Behavior: Overall slightly active  Oriented to: Grossly person place and time  Attention Span and Concentration: Grossly intact  Speech: Mildly depressed tone  Language: English  Mood:  sad   Affect: Constricted  Associations:  no loose associations  Thought Process:  logical, linear and goal oriented  Thought Content: No evidence of delusions or suicidal or homicidal ideation plan or intent  Memory: Grossly intact  Fund of Knowledge: Grossly intact  Insight:  good  Judgment:  intact, adequate for safety  Impulse Control:  intact        DIAGNOSES:   ADHD, unspecified type  Major Depressive Disorder, moderate, recurrent, in partial remission  Unspecified anxiety disorder  By history PTSD  Insomnia, unspecified type      ASSESSMENT:   Patient with history of anxiety, depression and trauma with reported prior diagnosis of PTSD with sufficient trigger.  Has consistent symptoms pretrauma and from early on that are consistent with ADHD and may not be influenced by depression/anxiety given continuity and chronicity.  Meets criteria for ADHD.    Today Maria Del Rosario Ward reports  no  suicidal ideations. In addition, she has notable risk factors for self-harm, including anxiety. However, risk is mitigated by commitment to family and absence of past attempts. Therefore, based on all available evidence including the factors cited above, she does not appear to be at imminent risk for self-harm, does not meet criteria for a 72-hr hold, and therefore remains appropriate for ongoing outpatient level of care.       PLAN:       Patient advised of consultative model. Patient will continue to be seen for ongoing consultation and stabilization.    Does not meet criteria for involuntary treatment or hospitalization    Adderall 10 mg p.o. twice daily efficacy after second tablet x6 hours, low appetite but manageable => 3/23/2023 XR 25 mg po qam/IR 10-15 mg po qpm prn adhd effective, mild appetite reduction-Risks, benefits and alternatives discussed.  Patient provides verbal consent to treatment.  Advised of heart effects, discontinue for any heart related effects.  Advised of addiction, insomnia.    Escitalopram 20 mg daily    Hydroxyzine 25 mg p.o. 3 times daily as needed anxiety    Behavioral sleep interventions    Bedtime - 10:00PM; the earliest time to go to bed if actually sleepy    Waketime - 4:00AM; no sleep after     No sleep outside of schedule  Stimulus Control  -our brain can get mixed up with what bed is for. the brain can associate the bed with alertness, thinking, anxiety, etc.  Sometimes we need to re train the brain that bed is for sleeping. Here's how to do it:  -if waking up in the middle of the night and think you cannot fall asleep, recommend leaving the bed, relaxing, and returning to bed when you feel like you can fall asleep quickly    -We set a recommended bedtime; however, if you are in the middle of the night wide-awake, angry, anxious or thinking a lot we recommend waiting until you feel like you could fall asleep quickly then going to bed.        Return in 3-4  weeks    Administrative Billing:   Time spent with patient was greater than 50% of time and/or significant time was spent in counseling and coordination of care regarding above diagnoses and treatment plan. Pre charting time and post charting time/documentation/coordination are done on date of service.      Signed:   Timbo Moore M.D.  [facility] Psychiatry Service    Disclaimer: This note consists of symbols derived from keyboarding, dictation and/or voice recognition software. As a result, there may be errors in the script that have gone undetected. Please consider this when interpreting information found in this chart.

## 2023-04-20 NOTE — PATIENT INSTRUCTIONS
Behavioral sleep interventions  Bedtime - 10:00PM; the earliest time to go to bed if actually sleepy  Waketime - 4:00AM; no sleep after   No sleep outside of schedule  Stimulus Control  -our brain can get mixed up with what bed is for. the brain can associate the bed with alertness, thinking, anxiety, etc.  Sometimes we need to re train the brain that bed is for sleeping. Here's how to do it:  -if waking up in the middle of the night and think you cannot fall asleep, recommend leaving the bed, relaxing, and returning to bed when you feel like you can fall asleep quickly  -We set a recommended bedtime; however, if you are in the middle of the night wide-awake, angry, anxious or thinking a lot we recommend waiting until you feel like you could fall asleep quickly then going to bed.      Patient Education   Collaborative Care Psychiatry Service  What to Expect  Here's what to expect from your Collaborative Care Psychiatry Service (CCPS).   About CCPS  CCPS means 2 people work together to help you get better. You'll meet with a behavioral health clinician and a psychiatric doctor. A behavioral health clinician helps people with mental health problems by talking with them. A psychiatric doctor helps people by giving them medicine.  How it works  At every visit, you'll see the behavioral health clinician (BHC) first. They'll talk with you about how you're doing and teach you how to feel better.   Then you'll see the psychiatric doctor. This doctor can help you deal with troubling thoughts and feelings by giving you medicine. They'll make sure you know the plan for your care.   CCPS usually takes 3 to 6 visits. If you need more visits, we may have you start seeing a different psychiatric doctor for ongoing care.  If you have any questions or concerns, we'll be glad to talk with you.  About visits  Be open  At your visits, please talk openly about your problems. It may feel hard, but it's the best way for us to help  "you.  Cancelling visits  If you can't come to your visit, please call us right away at [phone]. If you don't cancel at least 24 hours (1 full day) before your visit, that's \"late cancellation.\"  Being late to visits  Being very late is the same as not showing up. You will be a \"no show\" if:  Your appointment starts with a [facility], and you're more than 15 minutes late for a 30-minute (half hour) visit. This will also cancel your appointment with the psychiatric doctor.  Your appointment is with a psychiatric doctor only, and you're more than 15 minutes late for a 30-minute (half hour) visit.  Your appointment is with a psychiatric doctor only, and you're more than 30 minutes late for a 60-minute (full hour) visit.  If you cancel late or don't show up 2 times within 6 months, we may end your care.   Getting help between visits  If you need help between visits, you can call us Monday to Friday from 8 a.m. to 4:30 p.m. at [phone].  Emergency care  Call 911 or go to the nearest emergency department if your life or someone else's life is in danger.  Call 988 anytime to reach the national Suicide and Crisis hotline.  Medicine refills  To refill your medicine, call your pharmacy. You can also call [facility]'s Behavioral Access at [phone], Monday to Friday, 8 a.m. to 4:30 p.m. It can take 1 to 3 business days to get a refill.   Forms, letters, and tests  You may have papers to fill out, like FMLA, short-term disability, and workability. We can help you with these forms at your visits, but you must have an appointment. You may need more than 1 visit for this, to be in an intensive therapy program, or both.  Before we can give you medicine for ADHD, we may refer you to get tested for it or confirm it another way.  We may not be able to give you an emotional support animal letter.  We don't do mental health checks ordered by the court.   We don't do mental health testing, but we can refer you to get " tested.   Thank you for choosing us for your care.  For informational purposes only. Not to replace the advice of your health care provider. Copyright   2022 [facility]. All rights reserved. [employer] 346065 - 12/22.

## 2023-05-10 ENCOUNTER — OFFICE VISIT (OUTPATIENT)
Dept: FAMILY MEDICINE | Facility: CLINIC | Age: 28
End: 2023-05-10
Payer: COMMERCIAL

## 2023-05-10 VITALS
HEART RATE: 88 BPM | BODY MASS INDEX: 29.59 KG/M2 | TEMPERATURE: 97.9 F | DIASTOLIC BLOOD PRESSURE: 85 MMHG | HEIGHT: 63 IN | WEIGHT: 167 LBS | OXYGEN SATURATION: 95 % | SYSTOLIC BLOOD PRESSURE: 125 MMHG

## 2023-05-10 DIAGNOSIS — J45.41 MODERATE PERSISTENT ASTHMA WITH ACUTE EXACERBATION: Primary | ICD-10-CM

## 2023-05-10 PROCEDURE — 99214 OFFICE O/P EST MOD 30 MIN: CPT | Performed by: NURSE PRACTITIONER

## 2023-05-10 RX ORDER — FLUTICASONE PROPIONATE 110 UG/1
1 AEROSOL, METERED RESPIRATORY (INHALATION) 2 TIMES DAILY
Qty: 12 G | Refills: 11 | Status: SHIPPED | OUTPATIENT
Start: 2023-05-10 | End: 2023-08-02

## 2023-05-10 ASSESSMENT — PATIENT HEALTH QUESTIONNAIRE - PHQ9
SUM OF ALL RESPONSES TO PHQ QUESTIONS 1-9: 11
SUM OF ALL RESPONSES TO PHQ QUESTIONS 1-9: 11
10. IF YOU CHECKED OFF ANY PROBLEMS, HOW DIFFICULT HAVE THESE PROBLEMS MADE IT FOR YOU TO DO YOUR WORK, TAKE CARE OF THINGS AT HOME, OR GET ALONG WITH OTHER PEOPLE: SOMEWHAT DIFFICULT

## 2023-05-10 NOTE — PATIENT INSTRUCTIONS
Start Flovent 1 puff twice daily.  Rinse mouth after use.  Continue albuterol as needed.  Follow-up if not improving with this.

## 2023-05-10 NOTE — PROGRESS NOTES
"  Assessment & Plan     Moderate persistent asthma with acute exacerbation  Start Flovent.  Continue albuterol as needed.  Continue Claritin.  If not improving, I would like PFTs checked, I previously ordered by she did not go.  Recommend smoking cessation.   Consider asthma/pulmonology referral.     - fluticasone (FLOVENT HFA) 110 MCG/ACT inhaler; Inhale 1 puff into the lungs 2 times daily         MED REC REQUIRED  Post Medication Reconciliation Status:  Discharge medications reconciled and changed, see notes/orders    BMI:   Estimated body mass index is 29.58 kg/m  as calculated from the following:    Height as of this encounter: 1.6 m (5' 3\").    Weight as of this encounter: 75.8 kg (167 lb).       Candace Diaz [facility] [place] CHERIE Mix is a 27 year old, presenting for the following health issues:  ER F/U    HPI     ED/UC Followup:    Facility:  [facility]  Date of visit: 4/29/2023  Reason for visit: Exacerbation of asthma, unspecified asthma severity, unspecified whether persistent (Primary Dx)  Discharge Disposition: Home Self Care  Current Status: improved      Seen in ER for likely asthma attack.   CXR okay and labs okay.  Given steroids and nebs and helped.  Sent home with 5 days of prednisone.  Leading up to this had been having worsening symptoms for about 2 weeks. Having episodes of chest tightness, wheezing, can't breathing.  Use albuterol inhaler, helps, but having to use it frequently.   Symptoms worse at night and sometimes with activity.  Having some nights sweats the last few weeks too.       Not formally diagnosed with asthma.  I ordered PFTs in May of 2022, but she did not schedule that.    Smoking- age 15 to current with breaks during pregnancy.  Currently having a few cigarettes per day.  Has always had seasonal allergies.   Takes Claritin 10 mg year round.         Review of Systems   Constitutional, HEENT, cardiovascular, pulmonary, gi and gu systems are " "negative, except as otherwise noted.      Objective    /85 (BP Location: Left arm, Patient Position: Sitting, Cuff Size: Adult Regular)   Pulse 88   Temp 97.9  F (36.6  C)   Ht 1.6 m (5' 3\")   Wt 75.8 kg (167 lb)   SpO2 95%   BMI 29.58 kg/m    Body mass index is 29.58 kg/m .  Physical Exam   GENERAL: healthy, alert and no distress  EYES: Eyes grossly normal to inspection, PERRL and conjunctivae and sclerae normal  HENT:  nose and mouth without ulcers or lesions  RESP: lungs clear to auscultation - no rales, rhonchi or wheezes  CV: regular rate and rhythm, normal S1 S2, no S3 or S4, no murmur, click or rub, no peripheral edema and peripheral pulses strong  MS: no gross musculoskeletal defects noted, no edema  SKIN: no suspicious lesions or rashes  NEURO: Normal strength and tone, mentation intact and speech normal    Labs and imaging reviewed in Care Everywhere                Answers for HPI/ROS submitted by the patient on 5/10/2023  If you checked off any problems, how difficult have these problems made it for you to do your work, take care of things at home, or get along with other people?: Somewhat difficult  PHQ9 TOTAL SCORE: 11      "

## 2023-05-24 ENCOUNTER — VIRTUAL VISIT (OUTPATIENT)
Dept: PSYCHIATRY | Facility: CLINIC | Age: 28
End: 2023-05-24
Payer: COMMERCIAL

## 2023-05-24 DIAGNOSIS — G47.00 INSOMNIA, UNSPECIFIED TYPE: ICD-10-CM

## 2023-05-24 DIAGNOSIS — F41.1 GAD (GENERALIZED ANXIETY DISORDER): ICD-10-CM

## 2023-05-24 DIAGNOSIS — F90.9 ATTENTION DEFICIT HYPERACTIVITY DISORDER (ADHD), UNSPECIFIED ADHD TYPE: Primary | ICD-10-CM

## 2023-05-24 DIAGNOSIS — F33.2 SEVERE EPISODE OF RECURRENT MAJOR DEPRESSIVE DISORDER, WITHOUT PSYCHOTIC FEATURES (H): ICD-10-CM

## 2023-05-24 PROCEDURE — 99214 OFFICE O/P EST MOD 30 MIN: CPT | Mod: VID | Performed by: PSYCHIATRY & NEUROLOGY

## 2023-05-24 RX ORDER — DEXTROAMPHETAMINE SACCHARATE, AMPHETAMINE ASPARTATE MONOHYDRATE, DEXTROAMPHETAMINE SULFATE AND AMPHETAMINE SULFATE 6.25; 6.25; 6.25; 6.25 MG/1; MG/1; MG/1; MG/1
25 CAPSULE, EXTENDED RELEASE ORAL DAILY
Qty: 30 CAPSULE | Refills: 0 | Status: SHIPPED | OUTPATIENT
Start: 2023-06-24 | End: 2023-07-24

## 2023-05-24 RX ORDER — DEXTROAMPHETAMINE SACCHARATE, AMPHETAMINE ASPARTATE, DEXTROAMPHETAMINE SULFATE AND AMPHETAMINE SULFATE 2.5; 2.5; 2.5; 2.5 MG/1; MG/1; MG/1; MG/1
TABLET ORAL
Qty: 45 TABLET | Refills: 0 | Status: SHIPPED | OUTPATIENT
Start: 2023-05-24 | End: 2023-07-03

## 2023-05-24 RX ORDER — DEXTROAMPHETAMINE SACCHARATE, AMPHETAMINE ASPARTATE MONOHYDRATE, DEXTROAMPHETAMINE SULFATE AND AMPHETAMINE SULFATE 6.25; 6.25; 6.25; 6.25 MG/1; MG/1; MG/1; MG/1
25 CAPSULE, EXTENDED RELEASE ORAL DAILY
Qty: 30 CAPSULE | Refills: 0 | Status: SHIPPED | OUTPATIENT
Start: 2023-05-24 | End: 2023-06-23

## 2023-05-24 RX ORDER — ESCITALOPRAM OXALATE 20 MG/1
20 TABLET ORAL DAILY
Qty: 90 TABLET | Refills: 0 | Status: SHIPPED | OUTPATIENT
Start: 2023-05-24 | End: 2023-11-13

## 2023-05-24 RX ORDER — DEXTROAMPHETAMINE SACCHARATE, AMPHETAMINE ASPARTATE MONOHYDRATE, DEXTROAMPHETAMINE SULFATE AND AMPHETAMINE SULFATE 6.25; 6.25; 6.25; 6.25 MG/1; MG/1; MG/1; MG/1
25 CAPSULE, EXTENDED RELEASE ORAL DAILY
Qty: 30 CAPSULE | Refills: 0 | Status: SHIPPED | OUTPATIENT
Start: 2023-07-25 | End: 2023-07-03

## 2023-05-24 NOTE — PROGRESS NOTES
Virtual Visit Details    Type of service:  Video Visit   Video Start Time: 11:35 AM  Video End Time:11:45 AM    Originating Location (pt. Location): Home  Distant Location (provider location):  On-site  Platform used for Video Visit: [facility] Psychiatry Consult Note    IDENTIFICATION   Name: Maria Del Rosario Ward   : 1995/27 year old      Sex:    @ female          Telemedicine Visit: The patient's condition can be safely assessed and treated via synchronous audio and visual telemedicine encounter.        Face to Face/patient Contact total time: 10 minutes  Pre Charting time: 1 minutes; Post charting time, communication and other activities: 6 minutes; Total time 17 minutes  11:34 AM          SUBJECTIVE   Reports doing good. ADHD sx managed with current dosing. Takes immediate release twice a week, sometimes 10 mg or sometimes 15 mg depending on need.  No complaints. Feels productive and motivated. Behavioral sleep interventions efficacious - much easier to get up in the mornings. Gets to bed at 10 pm, and asleep soon. Rarely wakes in the night. Up by 5am. Feel refreshed.       PHQ-9 scores:      2022     7:00 AM 2023     9:31 AM 5/10/2023    10:11 AM   PHQ-9 SCORE   PHQ-9 Total Score MyChart  22 (Severe depression) 11 (Moderate depression)   PHQ-9 Total Score 8 22 11       KEISHA-7 scores:      2022     7:00 AM 2023     9:31 AM 3/8/2023     3:38 PM   KEISHA-7 SCORE   Total Score  17 (severe anxiety) 8 (mild anxiety)   Total Score 9 17 8       OBJECTIVE     Vital Signs:   There were no vitals taken for this visit.    Labs:  na    Current Medications:  Current Outpatient Medications   Medication     albuterol (PROAIR HFA/PROVENTIL HFA/VENTOLIN HFA) 108 (90 Base) MCG/ACT inhaler     amphetamine-dextroamphetamine (ADDERALL XR) 25 MG 24 hr capsule     amphetamine-dextroamphetamine (ADDERALL) 10 MG tablet     escitalopram (LEXAPRO) 20 MG tablet     fluticasone (FLOVENT HFA) 110 MCG/ACT  inhaler     hydrOXYzine (VISTARIL) 25 MG capsule     levonorgestrel-ethinyl estradiol (NORDETTE) 0.15-30 MG-MCG tablet     loratadine (CLARITIN) 10 MG tablet     No current facility-administered medications for this visit.        The [place] Prescription Monitoring Program has been reviewed and there are no concerns about diversionary activity for controlled substances at this time.        ADDED HISTORY   na    MENTAL STATUS EXAMINATION:   Appearance: Intact to attention to grooming and hygiene  Attitude: Cooperative  Eye Contact: Fair  Gait and Station: Sitting and standing at separate times  Psychomotor Behavior: Overall slightly active  Oriented to: Grossly person place and time  Attention Span and Concentration: Grossly intact  Speech: Mildly depressed tone  Language: English  Mood:  good  Affect: euthymic  Associations:  no loose associations  Thought Process:  logical, linear and goal oriented  Thought Content: No evidence of delusions or suicidal or homicidal ideation plan or intent  Memory: Grossly intact  Fund of Knowledge: Grossly intact  Insight:  good  Judgment:  intact, adequate for safety  Impulse Control:  intact        DIAGNOSES:   ADHD, unspecified type  Major Depressive Disorder, moderate, recurrent, in partial remission  Unspecified anxiety disorder  By history PTSD  Insomnia, unspecified type      ASSESSMENT:   Patient with history of anxiety, depression and trauma with reported prior diagnosis of PTSD with sufficient trigger.  Has consistent symptoms pretrauma and from early on that are consistent with ADHD and may not be influenced by depression/anxiety given continuity and chronicity.  Meets criteria for ADHD. Treatment very effective.     Today Maria Del Rosario Ward reports  no suicidal ideations. In addition, she has notable risk factors for self-harm, including anxiety. However, risk is mitigated by commitment to family and absence of past attempts. Therefore, based on all available evidence  including the factors cited above, she does not appear to be at imminent risk for self-harm, does not meet criteria for a 72-hr hold, and therefore remains appropriate for ongoing outpatient level of care.       PLAN:       Consult completed, returning to PCP    Does not meet criteria for involuntary treatment or hospitalization    Adderall 10 mg p.o. twice daily efficacy after second tablet x6 hours, low appetite but manageable => 3/23/2023 XR 25 mg po qam/IR 10-15 mg po qpm prn adhd effective, mild appetite reduction-Risks, benefits and alternatives discussed.  Patient provides verbal consent to treatment.  Advised of heart effects, discontinue for any heart related effects.  Advised of addiction, insomnia.    Escitalopram 20 mg daily    Hydroxyzine 25 mg p.o. 3 times daily as needed anxiety    Behavioral sleep interventions - efficacious     Bedtime - 10:00PM; the earliest time to go to bed if actually sleepy    Waketime - 5ish AM; no sleep after     No sleep outside of schedule  Stimulus Control  -our brain can get mixed up with what bed is for. the brain can associate the bed with alertness, thinking, anxiety, etc.  Sometimes we need to re train the brain that bed is for sleeping. Here's how to do it:  -if waking up in the middle of the night and think you cannot fall asleep, recommend leaving the bed, relaxing, and returning to bed when you feel like you can fall asleep quickly    -We set a recommended bedtime; however, if you are in the middle of the night wide-awake, angry, anxious or thinking a lot we recommend waiting until you feel like you could fall asleep quickly then going to bed.    Return to PCP in 3 months    Patient has completed Collaborative Care Psychiatry Service consultation and graduated back to Primary Care. Post consultation recommendations are below:  -adderall may be titrated by 5-10 mg increments up to a total of 60 mg in 24 hours  -If anxiety or depression worsen may consider adding  buspirone with dosage titration up to 30 mg p.o. twice daily or Wellbutrin XL up to 300 mg daily  -for any questions please send me a Teams or staff message. You may also request an eConsult, a new consultation referral, or refer the patient to establish care with a Psychiatric provider.         Administrative Billing:   Time spent with patient was greater than 50% of time and/or significant time was spent in counseling and coordination of care regarding above diagnoses and treatment plan. Pre charting time and post charting time/documentation/coordination are done on date of service.      Signed:   Timbo Moore M.D.  [facility] Psychiatry Service    Disclaimer: This note consists of symbols derived from keyboarding, dictation and/or voice recognition software. As a result, there may be errors in the script that have gone undetected. Please consider this when interpreting information found in this chart.

## 2023-06-29 DIAGNOSIS — F41.9 ANXIETY: ICD-10-CM

## 2023-06-29 RX ORDER — HYDROXYZINE PAMOATE 25 MG/1
CAPSULE ORAL
Qty: 30 CAPSULE | Refills: 5 | Status: SHIPPED | OUTPATIENT
Start: 2023-06-29

## 2023-06-30 ENCOUNTER — MYC MEDICAL ADVICE (OUTPATIENT)
Dept: FAMILY MEDICINE | Facility: CLINIC | Age: 28
End: 2023-06-30
Payer: COMMERCIAL

## 2023-06-30 DIAGNOSIS — F90.9 ATTENTION DEFICIT HYPERACTIVITY DISORDER (ADHD), UNSPECIFIED ADHD TYPE: ICD-10-CM

## 2023-07-03 RX ORDER — DEXTROAMPHETAMINE SACCHARATE, AMPHETAMINE ASPARTATE MONOHYDRATE, DEXTROAMPHETAMINE SULFATE AND AMPHETAMINE SULFATE 6.25; 6.25; 6.25; 6.25 MG/1; MG/1; MG/1; MG/1
25 CAPSULE, EXTENDED RELEASE ORAL DAILY
Qty: 30 CAPSULE | Refills: 0 | Status: SHIPPED | OUTPATIENT
Start: 2023-07-25 | End: 2023-08-02

## 2023-07-03 RX ORDER — DEXTROAMPHETAMINE SACCHARATE, AMPHETAMINE ASPARTATE MONOHYDRATE, DEXTROAMPHETAMINE SULFATE AND AMPHETAMINE SULFATE 6.25; 6.25; 6.25; 6.25 MG/1; MG/1; MG/1; MG/1
25 CAPSULE, EXTENDED RELEASE ORAL DAILY
Qty: 30 CAPSULE | Refills: 0 | OUTPATIENT
Start: 2023-07-03

## 2023-07-03 RX ORDER — DEXTROAMPHETAMINE SACCHARATE, AMPHETAMINE ASPARTATE, DEXTROAMPHETAMINE SULFATE AND AMPHETAMINE SULFATE 2.5; 2.5; 2.5; 2.5 MG/1; MG/1; MG/1; MG/1
TABLET ORAL
Qty: 45 TABLET | Refills: 0 | Status: SHIPPED | OUTPATIENT
Start: 2023-07-03 | End: 2023-08-29

## 2023-07-03 RX ORDER — DEXTROAMPHETAMINE SACCHARATE, AMPHETAMINE ASPARTATE, DEXTROAMPHETAMINE SULFATE AND AMPHETAMINE SULFATE 2.5; 2.5; 2.5; 2.5 MG/1; MG/1; MG/1; MG/1
TABLET ORAL
Qty: 45 TABLET | Refills: 0 | OUTPATIENT
Start: 2023-07-03

## 2023-07-03 NOTE — TELEPHONE ENCOUNTER
Adderall refills submitted for 30 days.  Consult was completed and patient advised to schedule appointment with Candace york for continuing care.

## 2023-07-05 NOTE — TELEPHONE ENCOUNTER
Please ask patient to schedule appointment to discuss Adderall and will need to sign controlled substance agreement.  Needs visit before next refill is due.  Candace Diaz, CNP

## 2023-08-02 ENCOUNTER — OFFICE VISIT (OUTPATIENT)
Dept: FAMILY MEDICINE | Facility: CLINIC | Age: 28
End: 2023-08-02
Payer: COMMERCIAL

## 2023-08-02 VITALS
OXYGEN SATURATION: 97 % | HEART RATE: 94 BPM | TEMPERATURE: 99.6 F | BODY MASS INDEX: 30.48 KG/M2 | SYSTOLIC BLOOD PRESSURE: 130 MMHG | WEIGHT: 172 LBS | DIASTOLIC BLOOD PRESSURE: 89 MMHG | HEIGHT: 63 IN

## 2023-08-02 DIAGNOSIS — J45.40 MODERATE PERSISTENT ASTHMA WITHOUT COMPLICATION: ICD-10-CM

## 2023-08-02 DIAGNOSIS — F90.9 ATTENTION DEFICIT HYPERACTIVITY DISORDER (ADHD), UNSPECIFIED ADHD TYPE: Primary | ICD-10-CM

## 2023-08-02 PROCEDURE — 99214 OFFICE O/P EST MOD 30 MIN: CPT | Performed by: NURSE PRACTITIONER

## 2023-08-02 RX ORDER — DEXTROAMPHETAMINE SACCHARATE, AMPHETAMINE ASPARTATE MONOHYDRATE, DEXTROAMPHETAMINE SULFATE AND AMPHETAMINE SULFATE 6.25; 6.25; 6.25; 6.25 MG/1; MG/1; MG/1; MG/1
25 CAPSULE, EXTENDED RELEASE ORAL DAILY
Qty: 30 CAPSULE | Refills: 0 | Status: SHIPPED | OUTPATIENT
Start: 2023-08-02 | End: 2023-09-01

## 2023-08-02 RX ORDER — DEXTROAMPHETAMINE SACCHARATE, AMPHETAMINE ASPARTATE MONOHYDRATE, DEXTROAMPHETAMINE SULFATE AND AMPHETAMINE SULFATE 6.25; 6.25; 6.25; 6.25 MG/1; MG/1; MG/1; MG/1
25 CAPSULE, EXTENDED RELEASE ORAL DAILY
Qty: 30 CAPSULE | Refills: 0 | Status: SHIPPED | OUTPATIENT
Start: 2023-09-02 | End: 2023-10-02

## 2023-08-02 RX ORDER — BUDESONIDE AND FORMOTEROL FUMARATE DIHYDRATE 80; 4.5 UG/1; UG/1
2 AEROSOL RESPIRATORY (INHALATION) 2 TIMES DAILY
Qty: 10.2 G | Refills: 5 | Status: SHIPPED | OUTPATIENT
Start: 2023-08-02 | End: 2024-08-20

## 2023-08-02 RX ORDER — DEXTROAMPHETAMINE SACCHARATE, AMPHETAMINE ASPARTATE MONOHYDRATE, DEXTROAMPHETAMINE SULFATE AND AMPHETAMINE SULFATE 6.25; 6.25; 6.25; 6.25 MG/1; MG/1; MG/1; MG/1
25 CAPSULE, EXTENDED RELEASE ORAL DAILY
Qty: 30 CAPSULE | Refills: 0 | Status: SHIPPED | OUTPATIENT
Start: 2023-10-03 | End: 2024-01-16

## 2023-08-02 ASSESSMENT — PATIENT HEALTH QUESTIONNAIRE - PHQ9
SUM OF ALL RESPONSES TO PHQ QUESTIONS 1-9: 4
10. IF YOU CHECKED OFF ANY PROBLEMS, HOW DIFFICULT HAVE THESE PROBLEMS MADE IT FOR YOU TO DO YOUR WORK, TAKE CARE OF THINGS AT HOME, OR GET ALONG WITH OTHER PEOPLE: SOMEWHAT DIFFICULT
SUM OF ALL RESPONSES TO PHQ QUESTIONS 1-9: 4

## 2023-08-02 ASSESSMENT — PAIN SCALES - GENERAL: PAINLEVEL: NO PAIN (0)

## 2023-08-02 ASSESSMENT — ASTHMA QUESTIONNAIRES: ACT_TOTALSCORE: 11

## 2023-08-02 NOTE — PATIENT INSTRUCTIONS
Try switching Claritin to Zyrtec- take every day.   Recommend Flonase nasal spray every day- check that this is what you have at home.     Stop Flovent. Switching daily inhaler to Symbicort 2 puffs twice daily.  Continue albuterol as needed.     Referral to asthma specialist.      ADHD- return for follow-up in 6 months.   I sent on refill on the 25 mg today, let me know when you need a refill on the 10 mg.

## 2023-08-02 NOTE — PROGRESS NOTES
"  Assessment & Plan     Attention deficit hyperactivity disorder (ADHD), unspecified ADHD type  Chronic, stable.  Signed controlled substance agreement today. Follow-up in 6 months.    - amphetamine-dextroamphetamine (ADDERALL XR) 25 MG 24 hr capsule; Take 1 capsule (25 mg) by mouth daily for 30 days  - amphetamine-dextroamphetamine (ADDERALL XR) 25 MG 24 hr capsule; Take 1 capsule (25 mg) by mouth daily for 30 days  - amphetamine-dextroamphetamine (ADDERALL XR) 25 MG 24 hr capsule; Take 1 capsule (25 mg) by mouth daily for 30 days    Moderate persistent asthma without complication  Not controlled.  Referral to allergy/asthma.   Will try switching Flovent to Symbicort.  Continue albuterol prn.   Switch Claritin to Zyrtec.  Continue Flonase.    - Adult Allergy/Asthma Referral; Future  - budesonide-formoterol (SYMBICORT) 80-4.5 MCG/ACT Inhaler; Inhale 2 puffs into the lungs 2 times daily       BMI:   Estimated body mass index is 30.47 kg/m  as calculated from the following:    Height as of this encounter: 1.6 m (5' 3\").    Weight as of this encounter: 78 kg (172 lb).       Candace Diaz [facility] CHERIE Mix is a 28 year old, presenting for the following health issues:  Recheck Medication        8/2/2023    11:49 AM   Additional Questions   Roomed by barbara   Accompanied by pily       History of Present Illness       Reason for visit:  Medication continuance, pulmonary issues/referral and ER follow up    She eats 2-3 servings of fruits and vegetables daily.She consumes 1 sweetened beverage(s) daily.She exercises with enough effort to increase her heart rate 30 to 60 minutes per day.  She exercises with enough effort to increase her heart rate 3 or less days per week.   She is taking medications regularly.         Initially scheduled for ADHD follow-up.   Diagnosed by psychiatry, started on meds and stabilized, hand off to primary care for continuation.  Taking Adderall XR 25 " "mg daily and Adderall 10-15 mg once daily in the afternoon.  Going well.  Denies concerns.  Medication seems to be helping.      Also concerns regarding asthma.   3 exacerbation requiring ER visit in the last 4+ months, last was 7/28/23.  Feeling fully recovered at this time.  States asthma attacks only happen after intercourse.  She has tried using albuterol ahead of time.  She gets asthma symptoms at other times as well, but not as severe as after sex.  Current therapy is Claritin, Flovent and albuterol.  Also using nasal spray, she thinks it is Flonase.  States she never had these issues when younger.  Started to have problems after having covid-19 infection in 2022.  We have checked chest xrays in the past.  I also ordered PFTs in May of 2023, but I don't believe she went through with it.  ER provider suggested she see a specialist at this point.          Review of Systems   Constitutional, HEENT, cardiovascular, pulmonary, gi and gu systems are negative, except as otherwise noted.      Objective    /89   Pulse 94   Temp 99.6  F (37.6  C)   Ht 1.6 m (5' 3\")   Wt 78 kg (172 lb)   SpO2 97%   BMI 30.47 kg/m    Body mass index is 30.47 kg/m .  Physical Exam   GENERAL: healthy, alert and no distress  EYES: Eyes grossly normal to inspection, PERRL and conjunctivae and sclerae normal  RESP: lungs clear to auscultation - no rales, rhonchi or wheezes  CV: regular rate and rhythm, normal S1 S2, no S3 or S4, no murmur, click or rub, no peripheral edema and peripheral pulses strong  MS: no gross musculoskeletal defects noted, no edema  SKIN: no suspicious lesions or rashes  NEURO: Normal strength and tone, mentation intact and speech normal  PSYCH: mentation appears normal, affect normal/bright                  "

## 2023-08-02 NOTE — LETTER
[facility]  08/02/23  Patient: Maria Del Rosario Ward  YOB: 1995  Medical Record Number: [id number]                                                                                  Non-Opioid Controlled Substance Agreement    This is an agreement between you and your provider regarding safe and appropriate use of controlled substances prescribed by your care team. Controlled substances are?medicines that can cause physical and mental dependence (abuse).     There are strict laws about having and using these medicines. We here at [facility] are  committed to working with you in your efforts to get better. To support you in this work, we'll help you schedule regular office appointments for medicine refills. If we must cancel or change your appointment for any reason, we'll make sure you have enough medicine to last until your next appointment.     As a Provider, I will:   Listen carefully to your concerns while treating you with respect.   Recommend a treatment plan that I believe is in your best interest and may involve therapies other than medicine.    Talk with you often about the possible benefits and the risk of harm of any medicine that we prescribe for you.  Assess the safety of this medicine and check how well it works.    Provide a plan on how to taper (discontinue or go off) using this medicine if the decision is made to stop its use.      ::  As a Patient, I understand controlled substances:     Are prescribed by my care provider to help me function or work and manage my condition(s).?  Are strong medicines and can cause serious side effects.     Need to be taken exactly as prescribed.?Combining controlled substances with certain medicines or chemicals (such as illegal drugs, alcohol, sedatives, sleeping pills, and benzodiazepines) can be dangerous or even fatal.? If I stop taking my medicines suddenly, I may have severe withdrawal symptoms.     The risks, benefits, and  side effects of these medicine(s) were explained to me. I agree that:    I will take part in other treatments as advised by my care team. This may be psychiatry or counseling, physical therapy, behavioral therapy, group treatment or a referral to specialist.    I will keep all my appointments and understand this is part of the monitoring of controlled substances.?My care team may require an office visit for EVERY controlled substance refill. If I miss appointments or don t follow instructions, my care team may stop my medicine    I will take my medicines as prescribed. I will not change the dose or schedule unless my care team tells me to. There will be no refills if I run out early.      I may be asked to come to the clinic and complete a urine drug test or complete a pill count. If I don t give a urine sample or participate in a pill count, the care team may stop my medicine.    I will only receive controlled substance prescriptions from this clinic. If I am treated by another provider, I will tell them that I am taking controlled substances and that I have a treatment agreement with this provider. I will inform my [facility] care team within one business day if I am given a prescription for any controlled substance by another healthcare provider. My [facility] care team can contact other providers and pharmacists about my use of any medicines.    It is up to me to make sure that I don't run out of my medicines on weekends or holidays.?If my care team is willing to refill my prescription without a visit, I must request refills only during office hours. Refills may take up to 3 business days to process. I will use one pharmacy to fill all my controlled substance prescriptions. I will notify the clinic about any changes to my insurance or medicine availability.    I am responsible for my prescriptions. If the medicine/prescription is lost, stolen or destroyed, it will not be replaced.?I also agree not  to share controlled substance medicines with anyone.     I am aware I should not use any illegal or recreational drugs. I agree not to drink alcohol unless my care team says I can.     If I enroll in the [place] Medical Cannabis program, I will tell my care team before my next refill.    I will tell my care team right away if I become pregnant, have a new medical problem treated outside of my regular clinic, or have a change in my medicines.     I understand that this medicine can affect my thinking, judgment and reaction time.? Alcohol and drugs affect the brain and body, which can affect the safety of my driving. Being under the influence of alcohol or drugs can affect my decision-making, behaviors, personal safety and the safety of others. Driving while impaired (DWI) can occur if a person is driving, operating or in physical control of a car, motorcycle, boat, snowmobile, ATV, motorbike, off-road vehicle or any other motor vehicle ([place] Statute 169[place].20). I understand the risk if I choose to drive or operate any vehicle or machinery.    I understand that if I do not follow any of the conditions above, my prescriptions or treatment may be stopped or changed.   I agree that my provider, clinic care team and pharmacy may work with any city, state or federal law enforcement agency that investigates the misuse, sale or other diversion of my controlled medicine. I will allow my provider to discuss my care with, or share a copy of, this agreement with any other treating provider, pharmacy or emergency room where I receive care.     I have read this agreement and have asked questions about anything I did not understand.    ________________________________________________________  Patient Signature - Maria Del Rosario Ward     ___________________                   Date     ________________________________________________________  Provider Signature - Candace Diaz, CNP       ___________________                   Date      ________________________________________________________  Witness Signature (required if provider not present while patient signing)          ___________________                   Date

## 2023-08-09 ENCOUNTER — E-VISIT (OUTPATIENT)
Dept: FAMILY MEDICINE | Facility: CLINIC | Age: 28
End: 2023-08-09
Payer: COMMERCIAL

## 2023-08-09 DIAGNOSIS — N89.8 VAGINAL DISCHARGE: ICD-10-CM

## 2023-08-09 DIAGNOSIS — R35.0 URINARY FREQUENCY: Primary | ICD-10-CM

## 2023-08-09 PROCEDURE — 99421 OL DIG E/M SVC 5-10 MIN: CPT | Performed by: NURSE PRACTITIONER

## 2023-08-09 NOTE — PATIENT INSTRUCTIONS
Thank you for choosing us for your care. Given your symptoms, I would like you to do a lab-only visit to determine what is causing them.  I have placed the orders.  Please schedule an appointment with the lab right here in [employer], or call [phone].  I will let you know when the results are back and next steps to take.

## 2023-08-28 ENCOUNTER — MYC REFILL (OUTPATIENT)
Dept: FAMILY MEDICINE | Facility: CLINIC | Age: 28
End: 2023-08-28
Payer: COMMERCIAL

## 2023-08-28 DIAGNOSIS — F90.9 ATTENTION DEFICIT HYPERACTIVITY DISORDER (ADHD), UNSPECIFIED ADHD TYPE: ICD-10-CM

## 2023-08-29 ENCOUNTER — MYC MEDICAL ADVICE (OUTPATIENT)
Dept: FAMILY MEDICINE | Facility: CLINIC | Age: 28
End: 2023-08-29
Payer: COMMERCIAL

## 2023-08-29 DIAGNOSIS — N92.6 MISSED PERIOD: Primary | ICD-10-CM

## 2023-08-29 DIAGNOSIS — F90.9 ATTENTION DEFICIT HYPERACTIVITY DISORDER (ADHD), UNSPECIFIED ADHD TYPE: ICD-10-CM

## 2023-08-29 RX ORDER — DEXTROAMPHETAMINE SACCHARATE, AMPHETAMINE ASPARTATE, DEXTROAMPHETAMINE SULFATE AND AMPHETAMINE SULFATE 2.5; 2.5; 2.5; 2.5 MG/1; MG/1; MG/1; MG/1
TABLET ORAL
Qty: 45 TABLET | Refills: 0 | OUTPATIENT
Start: 2023-08-29

## 2023-08-29 RX ORDER — DEXTROAMPHETAMINE SACCHARATE, AMPHETAMINE ASPARTATE, DEXTROAMPHETAMINE SULFATE AND AMPHETAMINE SULFATE 2.5; 2.5; 2.5; 2.5 MG/1; MG/1; MG/1; MG/1
TABLET ORAL
Qty: 45 TABLET | Refills: 0 | Status: SHIPPED | OUTPATIENT
Start: 2023-08-29 | End: 2023-09-30

## 2023-08-29 NOTE — TELEPHONE ENCOUNTER
Refusing refill of Adderall XR 25 mg. Has a script on file that begins on 9/2/23 from previous provider.     amphetamine-dextroamphetamine (ADDERALL XR) 25 MG 24 hr capsule 30 capsule 0 9/2/2023 10/2/202       Sachi Deras RN on 8/29/2023 at 8:32 AM

## 2023-08-30 ENCOUNTER — MYC MEDICAL ADVICE (OUTPATIENT)
Dept: FAMILY MEDICINE | Facility: CLINIC | Age: 28
End: 2023-08-30

## 2023-08-30 ENCOUNTER — LAB (OUTPATIENT)
Dept: LAB | Facility: CLINIC | Age: 28
End: 2023-08-30
Payer: COMMERCIAL

## 2023-08-30 DIAGNOSIS — R35.0 URINARY FREQUENCY: ICD-10-CM

## 2023-08-30 DIAGNOSIS — N89.8 VAGINAL DISCHARGE: ICD-10-CM

## 2023-08-30 DIAGNOSIS — N93.9 ABNORMAL UTERINE BLEEDING: Primary | ICD-10-CM

## 2023-08-30 DIAGNOSIS — N92.6 MISSED PERIOD: ICD-10-CM

## 2023-08-30 LAB
ALBUMIN UR-MCNC: NEGATIVE MG/DL
APPEARANCE UR: CLEAR
BILIRUB UR QL STRIP: NEGATIVE
CLUE CELLS: ABNORMAL
COLOR UR AUTO: NORMAL
GLUCOSE UR STRIP-MCNC: NEGATIVE MG/DL
HCG SERPL QL: NEGATIVE
HCG UR QL: NEGATIVE
HGB UR QL STRIP: NEGATIVE
KETONES UR STRIP-MCNC: NEGATIVE MG/DL
LEUKOCYTE ESTERASE UR QL STRIP: NEGATIVE
NITRATE UR QL: NEGATIVE
PH UR STRIP: 7 [PH] (ref 5–7)
SKIP: NORMAL
SP GR UR STRIP: 1.02 (ref 1–1.03)
TRICHOMONAS, WET PREP: ABNORMAL
UROBILINOGEN UR STRIP-MCNC: NORMAL MG/DL
WBC'S/HIGH POWER FIELD, WET PREP: ABNORMAL
YEAST, WET PREP: ABNORMAL

## 2023-08-30 PROCEDURE — 36415 COLL VENOUS BLD VENIPUNCTURE: CPT

## 2023-08-30 PROCEDURE — 87210 SMEAR WET MOUNT SALINE/INK: CPT

## 2023-08-30 PROCEDURE — 84703 CHORIONIC GONADOTROPIN ASSAY: CPT

## 2023-08-30 PROCEDURE — 81003 URINALYSIS AUTO W/O SCOPE: CPT

## 2023-08-30 PROCEDURE — 81025 URINE PREGNANCY TEST: CPT

## 2023-08-30 NOTE — TELEPHONE ENCOUNTER
Patient asking for a serum HCG test to be added to her lab orders for today.  Lenora Ambrocio BSN, RN

## 2023-09-05 ENCOUNTER — ANCILLARY PROCEDURE (OUTPATIENT)
Dept: ULTRASOUND IMAGING | Facility: CLINIC | Age: 28
End: 2023-09-05
Attending: NURSE PRACTITIONER
Payer: COMMERCIAL

## 2023-09-05 DIAGNOSIS — N93.9 ABNORMAL UTERINE BLEEDING: ICD-10-CM

## 2023-09-05 PROCEDURE — 76856 US EXAM PELVIC COMPLETE: CPT | Mod: TC | Performed by: RADIOLOGY

## 2023-09-05 PROCEDURE — 76830 TRANSVAGINAL US NON-OB: CPT | Mod: TC | Performed by: RADIOLOGY

## 2023-09-06 ENCOUNTER — LAB REQUISITION (OUTPATIENT)
Dept: LAB | Facility: CLINIC | Age: 28
End: 2023-09-06
Payer: COMMERCIAL

## 2023-09-06 DIAGNOSIS — N92.5 OTHER SPECIFIED IRREGULAR MENSTRUATION: ICD-10-CM

## 2023-09-06 DIAGNOSIS — Z83.2 FAMILY HISTORY OF DISEASES OF THE BLOOD AND BLOOD-FORMING ORGANS AND CERTAIN DISORDERS INVOLVING THE IMMUNE MECHANISM: ICD-10-CM

## 2023-09-06 LAB
FACTOR V INTERPRETATION: NORMAL
LAB DIRECTOR COMMENTS: NORMAL
LAB DIRECTOR DISCLAIMER: NORMAL
LAB DIRECTOR INTERPRETATION: NORMAL
LAB DIRECTOR METHODOLOGY: NORMAL
LAB DIRECTOR RESULTS: NORMAL
SPECIMEN DESCRIPTION: NORMAL

## 2023-09-06 PROCEDURE — 81241 F5 GENE: CPT | Mod: ORL | Performed by: NURSE PRACTITIONER

## 2023-09-06 PROCEDURE — 84443 ASSAY THYROID STIM HORMONE: CPT | Mod: ORL | Performed by: NURSE PRACTITIONER

## 2023-09-06 PROCEDURE — 84702 CHORIONIC GONADOTROPIN TEST: CPT | Mod: ORL | Performed by: NURSE PRACTITIONER

## 2023-09-07 LAB
HCG INTACT+B SERPL-ACNC: <1 MIU/ML
TSH SERPL DL<=0.005 MIU/L-ACNC: 1.25 UIU/ML (ref 0.3–4.2)

## 2023-09-11 PROCEDURE — G0452 MOLECULAR PATHOLOGY INTERPR: HCPCS | Mod: 26 | Performed by: PATHOLOGY

## 2023-09-30 ENCOUNTER — MYC REFILL (OUTPATIENT)
Dept: FAMILY MEDICINE | Facility: CLINIC | Age: 28
End: 2023-09-30
Payer: COMMERCIAL

## 2023-09-30 DIAGNOSIS — F90.9 ATTENTION DEFICIT HYPERACTIVITY DISORDER (ADHD), UNSPECIFIED ADHD TYPE: ICD-10-CM

## 2023-10-02 RX ORDER — DEXTROAMPHETAMINE SACCHARATE, AMPHETAMINE ASPARTATE, DEXTROAMPHETAMINE SULFATE AND AMPHETAMINE SULFATE 2.5; 2.5; 2.5; 2.5 MG/1; MG/1; MG/1; MG/1
TABLET ORAL
Qty: 45 TABLET | Refills: 0 | Status: SHIPPED | OUTPATIENT
Start: 2023-10-02 | End: 2023-11-13

## 2023-10-14 ENCOUNTER — MYC MEDICAL ADVICE (OUTPATIENT)
Dept: FAMILY MEDICINE | Facility: CLINIC | Age: 28
End: 2023-10-14
Payer: COMMERCIAL

## 2023-10-14 DIAGNOSIS — R05.9 COUGH: ICD-10-CM

## 2023-10-14 DIAGNOSIS — J45.40 MODERATE PERSISTENT ASTHMA WITHOUT COMPLICATION: Primary | ICD-10-CM

## 2023-10-14 DIAGNOSIS — U07.1 CLINICAL DIAGNOSIS OF COVID-19: ICD-10-CM

## 2023-10-17 RX ORDER — ALBUTEROL SULFATE 90 UG/1
AEROSOL, METERED RESPIRATORY (INHALATION)
Qty: 18 G | Refills: 5 | Status: SHIPPED | OUTPATIENT
Start: 2023-10-17

## 2023-10-19 ENCOUNTER — LAB REQUISITION (OUTPATIENT)
Dept: LAB | Facility: CLINIC | Age: 28
End: 2023-10-19
Payer: COMMERCIAL

## 2023-10-19 DIAGNOSIS — Z11.52 ENCOUNTER FOR SCREENING FOR COVID-19: ICD-10-CM

## 2023-10-19 PROCEDURE — 87635 SARS-COV-2 COVID-19 AMP PRB: CPT | Mod: ORL | Performed by: NURSE PRACTITIONER

## 2023-10-20 LAB — SARS-COV-2 RNA RESP QL NAA+PROBE: NEGATIVE

## 2023-10-24 ENCOUNTER — E-VISIT (OUTPATIENT)
Dept: FAMILY MEDICINE | Facility: CLINIC | Age: 28
End: 2023-10-24
Payer: COMMERCIAL

## 2023-10-24 DIAGNOSIS — R10.9 FLANK PAIN: Primary | ICD-10-CM

## 2023-10-24 PROCEDURE — 99207 PR NON-BILLABLE SERV PER CHARTING: CPT | Performed by: NURSE PRACTITIONER

## 2023-10-24 NOTE — PATIENT INSTRUCTIONS
Thank you for choosing us for your care. Based on the information provided, I believe you should be seen in person for the symptoms you are describing. Please make a clinic appointment or go to urgent care for this issue.     You will not be charged for this eVisit.

## 2023-11-06 ENCOUNTER — E-VISIT (OUTPATIENT)
Dept: FAMILY MEDICINE | Facility: CLINIC | Age: 28
End: 2023-11-06
Payer: COMMERCIAL

## 2023-11-06 DIAGNOSIS — F17.200 NICOTINE DEPENDENCE, UNCOMPLICATED, UNSPECIFIED NICOTINE PRODUCT TYPE: Primary | ICD-10-CM

## 2023-11-06 PROCEDURE — 99422 OL DIG E/M SVC 11-20 MIN: CPT | Performed by: NURSE PRACTITIONER

## 2023-11-06 RX ORDER — POLYETHYLENE GLYCOL 3350 17 G
POWDER IN PACKET (EA) ORAL
Qty: 108 LOZENGE | Refills: 5 | Status: SHIPPED | OUTPATIENT
Start: 2023-11-06

## 2023-11-06 RX ORDER — VARENICLINE TARTRATE 1 MG/1
1 TABLET, FILM COATED ORAL 2 TIMES DAILY
Qty: 180 TABLET | Refills: 0 | Status: SHIPPED | OUTPATIENT
Start: 2023-12-06

## 2023-11-06 RX ORDER — VARENICLINE TARTRATE 0.5 MG/1
TABLET, FILM COATED ORAL
Qty: 95 TABLET | Refills: 0 | Status: SHIPPED | OUTPATIENT
Start: 2023-11-06

## 2023-11-06 NOTE — PATIENT INSTRUCTIONS
Chantix (varenicline) Oral Tablet     Uses  For quitting smoking.    Instructions  Take the medicine with 250 mL (1 cup) of water.    Take the medicine after eating a meal.    Keep the medicine at room temperature. Avoid heat and direct light.    Choose a date to quit smoking. Start this medicine 1 week before your chosen day to quit smoking.    It is important that you keep taking each dose of this medicine on time even if you are feeling well.    If you forget to take a dose on time, take it as soon as you remember. If it is almost time for the next dose, do not take the missed dose. Return to your normal dosing schedule. Do not take 2 doses of this medicine at one time.    Please tell your doctor and pharmacist about all the medicines you take. Include both prescription and over-the-counter medicines. Also tell them about any vitamins, herbal medicines, or anything else you take for your health.    Cautions  This medicine is not recommended for use in children younger than 16.    Tell your doctor and pharmacist if you ever had an allergic reaction to a medicine. Symptoms of an allergic reaction can include trouble breathing, skin rash, itching, swelling, or severe dizziness.    Speak with your doctor about how you feel after you quit smoking. Some people on this medicine may feel depressed, have trouble sleeping, become irritable, or gain weight. Discuss these symptoms with your doctor. Your doctor may wish to adjust your medication dosage.    Do not use the medication any more than instructed.    Your ability to stay alert or to react quickly may be impaired by this medicine. Do not drive or operate machinery until you know how this medicine will affect you.    Call the doctor if there are any signs of confusion or unusual changes in behavior.    Tell the doctor or pharmacist if you are pregnant, planning to be pregnant, or breastfeeding.    Ask your pharmacist if this medicine can interact with any of your  other medicines. Be sure to tell them about all the medicines you take.    Do not start or stop any other medicines without first speaking to your doctor or pharmacist.    Do not share this medicine with anyone who has not been prescribed this medicine.    This medicine can cause serious side effects in some patients. Important information from the U.S. Food and Drug Administration (FDA) is available from your pharmacist. Please review it carefully with your pharmacist to understand the risks associated with this medicine.    Side Effects  The following is a list of some common side effects from this medicine. Please speak with your doctor about what you should do if you experience these or other side effects.    agitated feeling or trouble sleeping  constipation  drowsiness or sedation  excess gas  headaches  nausea  vivid dreams or nightmares  vomiting  Call your doctor or get medical help right away if you notice any of these more serious side effects:    change in behavior  chest pain  changes in memory, mood, or thinking  depression or feeling sad  hallucinations (unusual thoughts, seeing or hearing things that are not real)  mood changes  shortness of breath  symptoms of stroke (such as one-sided weakness, slurred speech, confusion)    A few people may have an allergic reaction to this medicine. Symptoms can include difficulty breathing, skin rash, itching, swelling, or severe dizziness. If you notice any of these symptoms, seek medical help quickly.    Extra  Please speak with your doctor, nurse, or pharmacist if you have any questions about this medicine.       [email or web address]  IMPORTANT NOTE: This document tells you briefly how to take your medicine, but it does not tell you all there is to know about it. Your doctor or pharmacist may give you other documents about your medicine. Please talk to them if you have any questions. Always follow their advice. There is a more  complete description of this medicine available in English. Scan this code on your smartphone or tablet or use the web address below. You can also ask your pharmacist for a printout. If you have any questions, please ask your pharmacist.   2021 [employer].      6473-8618 [employer]. All rights reserved. This information is not intended as a substitute for professional medical care. Always follow your healthcare professional's instructions.        Nicotine (Nicorette) Oral Lozenge     Uses  For quitting smoking.    Instructions  Dissolve the tablet completely in your mouth, and swallow the medicine as it dissolves. Do not chew or swallow the tablet whole.    Change the location of the medicine in the mouth with each dose to avoid irritation.    Do not eat or drink for 15 minutes before and while using this medicine.    Store at room temperature in a dry place. Do not keep in the bathroom.    Keep the medicine away from heat and light.    Please tell your doctor and pharmacist about all the medicines you take. Include both prescription and over-the-counter medicines. Also tell them about any vitamins, herbal medicines, or anything else you take for your health.    If your symptoms do not improve or they worsen while on this medicine, contact your doctor.    It is very important that you continue using this medicine for the full number of days that it is prescribed. Please do not stop the medicine even if you start to feel better after the first few days.    This medicine contains sodium. If you are on a low sodium diet, consider this as part of your total sodium diet.    Do not use more than 20 doses in 24 hours.    Cautions  Tell your doctor and pharmacist if you ever had an allergic reaction to a medicine. Symptoms of an allergic reaction can include trouble breathing, skin rash, itching, swelling, or severe dizziness.    Do not use the medication any more than instructed.    Avoid smoking  while on this medicine. Smoking may increase your risk for stroke, heart attack, blood clots, high blood pressure, and other diseases of the heart and blood vessels.    Tell the doctor or pharmacist if you are pregnant, planning to be pregnant, or breastfeeding.    This medicine may contain aspartame (phenylalanine). Check with your doctor or pharmacist if you have a medical condition that requires you to limit or avoid this ingredient.    Ask your pharmacist if this medicine can interact with any of your other medicines. Be sure to tell them about all the medicines you take.    Please tell all your doctors and dentists that you are on this medicine before they provide care.    Do not start or stop any other medicines without first speaking to your doctor or pharmacist.    Side Effects  The following is a list of some common side effects from this medicine. Please speak with your doctor about what you should do if you experience these or other side effects.    headaches  hiccups  mouth sores or irritation  nausea  sore throat  stomach upset or abdominal pain    Call your doctor or get medical help right away if you notice any of these more serious side effects:    chest pain  confusion  dizziness  swelling of the legs, feet, and hands  severe or persistent headache  fast or irregular heart beats  mood changes  feeling of numbness or tingling in your hands and feet  slurred speech  weakness on one side of the body    A few people may have an allergic reaction to this medicine. Symptoms can include difficulty breathing, skin rash, itching, swelling, or severe dizziness. If you notice any of these symptoms, seek medical help quickly.    Extra  Please speak with your doctor, nurse, or pharmacist if you have any questions about this medicine.        [email or web address]  IMPORTANT NOTE: This document tells you briefly how to take your medicine, but it does not tell you all there is to know about  it. Your doctor or pharmacist may give you other documents about your medicine. Please talk to them if you have any questions. Always follow their advice. There is a more complete description of this medicine available in English. Scan this code on your smartphone or tablet or use the web address below. You can also ask your pharmacist for a printout. If you have any questions, please ask your pharmacist.   2021 [employer].      7865-0043 [employer]. All rights reserved. This information is not intended as a substitute for professional medical care. Always follow your healthcare professional's instructions.  Thank you for choosing us for your care. I have placed an order for a prescription so that you can start treatment. View your full visit summary for details by clicking on the link below. Your pharmacist will able to address any questions you may have about the medication.     If you're not feeling better within 5-7 days, please schedule an appointment.  You can schedule an appointment right here in [facility], or call [phone]  If the visit is for the same symptoms as your eVisit, we'll refund the cost of your eVisit if seen within seven days.

## 2023-11-13 ENCOUNTER — MYC REFILL (OUTPATIENT)
Dept: FAMILY MEDICINE | Facility: CLINIC | Age: 28
End: 2023-11-13
Payer: COMMERCIAL

## 2023-11-13 DIAGNOSIS — F90.9 ATTENTION DEFICIT HYPERACTIVITY DISORDER (ADHD), UNSPECIFIED ADHD TYPE: ICD-10-CM

## 2023-11-13 DIAGNOSIS — F33.2 SEVERE EPISODE OF RECURRENT MAJOR DEPRESSIVE DISORDER, WITHOUT PSYCHOTIC FEATURES (H): ICD-10-CM

## 2023-11-13 DIAGNOSIS — F41.1 GAD (GENERALIZED ANXIETY DISORDER): ICD-10-CM

## 2023-11-13 RX ORDER — ESCITALOPRAM OXALATE 20 MG/1
20 TABLET ORAL DAILY
Qty: 30 TABLET | Refills: 0 | Status: SHIPPED | OUTPATIENT
Start: 2023-11-13 | End: 2023-12-18

## 2023-11-13 RX ORDER — DEXTROAMPHETAMINE SACCHARATE, AMPHETAMINE ASPARTATE, DEXTROAMPHETAMINE SULFATE AND AMPHETAMINE SULFATE 2.5; 2.5; 2.5; 2.5 MG/1; MG/1; MG/1; MG/1
TABLET ORAL
Qty: 45 TABLET | Refills: 0 | Status: SHIPPED | OUTPATIENT
Start: 2023-11-13 | End: 2023-12-28

## 2023-11-13 NOTE — TELEPHONE ENCOUNTER
Date of Last Office Visit: 4/20/23  Date of Next Office Visit: None. Scheduling attempting to contact pt   No shows since last visit: x 1 on 5/11/23  Cancellations since last visit: 0    Medication requested: escitalopram (LEXAPRO) 20 MG tablet  Date last ordered: 5/24/23   Qty: 90 Refills: 0      Disp Refills Start End JENNA   escitalopram (LEXAPRO) 20 MG tablet 90 tablet 0 5/24/2023  No   Sig - Route: Take 1 tablet (20 mg) by mouth daily - Oral   Sent to pharmacy as: Escitalopram Oxalate 20 MG Oral Tablet (LEXAPRO)   Class: E-Prescribe       Lapse in medication adherence greater than 5 days?: No. As per pharmacy, last filled on 8/20/23 Qty: 90  If yes, call patient and gather details: NA  Medication refill request verified as identical to current order?: yes  Result of Last DAM, VPA, Li+ Level, CBC, or Carbamazepine Level (at or since last visit): N/A    Last visit treatment plan: RTN in 3-4 weeks      Patient advised of consultative model. Patient will continue to be seen for ongoing consultation and stabilization.  Does not meet criteria for involuntary treatment or hospitalization  Adderall 10 mg p.o. twice daily efficacy after second tablet x6 hours, low appetite but manageable => 3/23/2023 XR 25 mg po qam/IR 10-15 mg po qpm prn adhd effective, mild appetite reduction-Risks, benefits and alternatives discussed.  Patient provides verbal consent to treatment.  Advised of heart effects, discontinue for any heart related effects.  Advised of addiction, insomnia.  Escitalopram 20 mg daily  Hydroxyzine 25 mg p.o. 3 times daily as needed anxiety  Behavioral sleep interventions  Bedtime - 10:00PM; the earliest time to go to bed if actually sleepy  Waketime - 4:00AM; no sleep after   No sleep outside of schedule  Stimulus Control  -our brain can get mixed up with what bed is for. the brain can associate the bed with alertness, thinking, anxiety, etc.  Sometimes we need to re train the brain that bed is for sleeping.  Here's how to do it:  -if waking up in the middle of the night and think you cannot fall asleep, recommend leaving the bed, relaxing, and returning to bed when you feel like you can fall asleep quickly  -We set a recommended bedtime; however, if you are in the middle of the night wide-awake, angry, anxious or thinking a lot we recommend waiting until you feel like you could fall asleep quickly then going to bed.     Return in 3-4 weeks  []Medication refilled per  Medication Refill in Ambulatory Care  policy.  [x]Medication unable to be refilled by RN due to criteria not met as indicated below:    []Eligibility - not seen in the last year   [x]Supervision - no future appointment   [x]Compliance - no shows, cancellations or lapse in therapy   []Verification - order discrepancy   []Controlled medication   []Medication not included in policy   [x]90-day supply request   [x]Other. Pt is overdue for appt.  LPN processed

## 2023-12-04 ENCOUNTER — E-VISIT (OUTPATIENT)
Dept: FAMILY MEDICINE | Facility: CLINIC | Age: 28
End: 2023-12-04
Payer: COMMERCIAL

## 2023-12-04 DIAGNOSIS — J01.90 ACUTE SINUSITIS WITH SYMPTOMS > 10 DAYS: Primary | ICD-10-CM

## 2023-12-04 PROCEDURE — 99421 OL DIG E/M SVC 5-10 MIN: CPT | Performed by: NURSE PRACTITIONER

## 2023-12-04 NOTE — PATIENT INSTRUCTIONS
Thank you for choosing us for your care. I have placed an order for a prescription so that you can start treatment. View your full visit summary for details by clicking on the link below. Your pharmacist will able to address any questions you may have about the medication.     If you're not feeling better within 5-7 days, please schedule an appointment.  You can schedule an appointment right here in [facility], or call [phone]  If the visit is for the same symptoms as your eVisit, we'll refund the cost of your eVisit if seen within seven days.

## 2023-12-28 ENCOUNTER — MYC REFILL (OUTPATIENT)
Dept: FAMILY MEDICINE | Facility: CLINIC | Age: 28
End: 2023-12-28

## 2023-12-28 DIAGNOSIS — F90.9 ATTENTION DEFICIT HYPERACTIVITY DISORDER (ADHD), UNSPECIFIED ADHD TYPE: ICD-10-CM

## 2023-12-28 RX ORDER — DEXTROAMPHETAMINE SACCHARATE, AMPHETAMINE ASPARTATE, DEXTROAMPHETAMINE SULFATE AND AMPHETAMINE SULFATE 2.5; 2.5; 2.5; 2.5 MG/1; MG/1; MG/1; MG/1
TABLET ORAL
Qty: 45 TABLET | Refills: 0 | Status: SHIPPED | OUTPATIENT
Start: 2023-12-28 | End: 2024-01-05

## 2024-01-05 ENCOUNTER — MYC MEDICAL ADVICE (OUTPATIENT)
Dept: FAMILY MEDICINE | Facility: CLINIC | Age: 29
End: 2024-01-05
Payer: COMMERCIAL

## 2024-01-05 DIAGNOSIS — F90.9 ATTENTION DEFICIT HYPERACTIVITY DISORDER (ADHD), UNSPECIFIED ADHD TYPE: ICD-10-CM

## 2024-01-05 RX ORDER — DEXTROAMPHETAMINE SACCHARATE, AMPHETAMINE ASPARTATE, DEXTROAMPHETAMINE SULFATE AND AMPHETAMINE SULFATE 2.5; 2.5; 2.5; 2.5 MG/1; MG/1; MG/1; MG/1
TABLET ORAL
Qty: 45 TABLET | Refills: 0 | Status: SHIPPED | OUTPATIENT
Start: 2024-01-05 | End: 2024-01-09

## 2024-01-05 NOTE — TELEPHONE ENCOUNTER
Last refill of pended medication not in stock at pharmacy.   Requesting refill to be resent to new, pended pharmacy.     Lenora Ambrocio BSN, RN

## 2024-01-07 ENCOUNTER — MYC MEDICAL ADVICE (OUTPATIENT)
Dept: FAMILY MEDICINE | Facility: CLINIC | Age: 29
End: 2024-01-07
Payer: COMMERCIAL

## 2024-01-07 DIAGNOSIS — F90.9 ATTENTION DEFICIT HYPERACTIVITY DISORDER (ADHD), UNSPECIFIED ADHD TYPE: Primary | ICD-10-CM

## 2024-01-09 RX ORDER — DEXTROAMPHETAMINE SACCHARATE, AMPHETAMINE ASPARTATE, DEXTROAMPHETAMINE SULFATE AND AMPHETAMINE SULFATE 5; 5; 5; 5 MG/1; MG/1; MG/1; MG/1
TABLET ORAL
Qty: 15 TABLET | Refills: 0 | Status: SHIPPED | OUTPATIENT
Start: 2024-01-09 | End: 2024-02-14 | Stop reason: DRUGHIGH

## 2024-01-15 ENCOUNTER — MYC MEDICAL ADVICE (OUTPATIENT)
Dept: FAMILY MEDICINE | Facility: CLINIC | Age: 29
End: 2024-01-15
Payer: COMMERCIAL

## 2024-01-15 DIAGNOSIS — F90.9 ATTENTION DEFICIT HYPERACTIVITY DISORDER (ADHD), UNSPECIFIED ADHD TYPE: ICD-10-CM

## 2024-01-16 ENCOUNTER — E-VISIT (OUTPATIENT)
Dept: FAMILY MEDICINE | Facility: CLINIC | Age: 29
End: 2024-01-16
Payer: COMMERCIAL

## 2024-01-16 DIAGNOSIS — N92.6 MISSED PERIOD: Primary | ICD-10-CM

## 2024-01-16 PROCEDURE — 99421 OL DIG E/M SVC 5-10 MIN: CPT | Performed by: NURSE PRACTITIONER

## 2024-01-16 NOTE — PATIENT INSTRUCTIONS
Thank you for choosing us for your care. Given your symptoms, I would like you to do a lab-only visit to determine what is causing them.  I have placed the orders.  Please schedule an appointment with the lab right here in [place], or call [phone].  I will let you know when the results are back and next steps to take.

## 2024-01-16 NOTE — TELEPHONE ENCOUNTER
Routing to provider, see [employer] message below.  Adderall XR 25 mg no longer on current med list.

## 2024-01-17 ENCOUNTER — LAB (OUTPATIENT)
Dept: LAB | Facility: CLINIC | Age: 29
End: 2024-01-17
Payer: COMMERCIAL

## 2024-01-17 ENCOUNTER — DOCUMENTATION ONLY (OUTPATIENT)
Dept: LAB | Facility: CLINIC | Age: 29
End: 2024-01-17

## 2024-01-17 DIAGNOSIS — N92.6 MISSED PERIOD: ICD-10-CM

## 2024-01-17 LAB — HCG INTACT+B SERPL-ACNC: <1 MIU/ML

## 2024-01-17 PROCEDURE — 36415 COLL VENOUS BLD VENIPUNCTURE: CPT

## 2024-01-17 PROCEDURE — 84702 CHORIONIC GONADOTROPIN TEST: CPT

## 2024-01-17 RX ORDER — DEXTROAMPHETAMINE SACCHARATE, AMPHETAMINE ASPARTATE MONOHYDRATE, DEXTROAMPHETAMINE SULFATE AND AMPHETAMINE SULFATE 6.25; 6.25; 6.25; 6.25 MG/1; MG/1; MG/1; MG/1
25 CAPSULE, EXTENDED RELEASE ORAL DAILY
Qty: 30 CAPSULE | Refills: 0 | Status: SHIPPED | OUTPATIENT
Start: 2024-01-17 | End: 2024-02-14 | Stop reason: DRUGHIGH

## 2024-01-17 NOTE — PROGRESS NOTES
Maria Del Rosario presented to the [facility] lab on 1-17-24. She had orders for both a serum qualitative and a quantitative HCG test. Did you want both? Or is it a duplicate? Can we cancel the qualitative?    Kim Ramos MLT(ASCP)

## 2024-01-24 ENCOUNTER — MYC REFILL (OUTPATIENT)
Dept: PSYCHIATRY | Facility: CLINIC | Age: 29
End: 2024-01-24
Payer: COMMERCIAL

## 2024-01-24 DIAGNOSIS — F41.1 GAD (GENERALIZED ANXIETY DISORDER): ICD-10-CM

## 2024-01-24 DIAGNOSIS — F33.2 SEVERE EPISODE OF RECURRENT MAJOR DEPRESSIVE DISORDER, WITHOUT PSYCHOTIC FEATURES (H): ICD-10-CM

## 2024-01-24 RX ORDER — ESCITALOPRAM OXALATE 20 MG/1
20 TABLET ORAL DAILY
Qty: 30 TABLET | Refills: 0 | Status: SHIPPED | OUTPATIENT
Start: 2024-01-24 | End: 2024-02-14

## 2024-01-24 NOTE — TELEPHONE ENCOUNTER
1) Received a fax from [facility] in [place] for escitalopram (LEXAPRO) 20 MG tablet.     2) Patient's care was transferred to her PCP on 5/24/23    PLAN:      Consult completed, returning to PCP  Does not meet criteria for involuntary treatment or hospitalization  Adderall 10 mg p.o. twice daily efficacy after second tablet x6 hours, low appetite but manageable => 3/23/2023 XR 25 mg po qam/IR 10-15 mg po qpm prn adhd effective, mild appetite reduction-Risks, benefits and alternatives discussed.  Patient provides verbal consent to treatment.  Advised of heart effects, discontinue for any heart related effects.  Advised of addiction, insomnia.  Escitalopram 20 mg daily  Hydroxyzine 25 mg p.o. 3 times daily as needed anxiety  Behavioral sleep interventions - efficacious     3) Patient has already established with a PCP.     4) RN will send notification back to the pharmacy that this patient is no longer under provider's care.     5) Attempted to call the patient. LVM to return a call to the clinic.     WALLACE JARVIS RN on 1/24/2024 at 11:53 AM

## 2024-02-05 ENCOUNTER — OFFICE VISIT (OUTPATIENT)
Dept: OBGYN | Facility: CLINIC | Age: 29
End: 2024-02-05
Payer: COMMERCIAL

## 2024-02-05 VITALS
SYSTOLIC BLOOD PRESSURE: 146 MMHG | HEIGHT: 63 IN | DIASTOLIC BLOOD PRESSURE: 89 MMHG | WEIGHT: 183.1 LBS | BODY MASS INDEX: 32.44 KG/M2

## 2024-02-05 DIAGNOSIS — R10.2 PELVIC PAIN IN FEMALE: Primary | ICD-10-CM

## 2024-02-05 PROCEDURE — 99213 OFFICE O/P EST LOW 20 MIN: CPT | Performed by: OBSTETRICS & GYNECOLOGY

## 2024-02-05 NOTE — PROGRESS NOTES
OB/GYN        NAME:  Maria Del Rosario Ward  PCP:  Manolo Diaz  MRN:  [id number]       Impression / Plan     28 year old  with:      ICD-10-CM    1. Pelvic pain in female  R10.2 US Pelvic Complete with Transvaginal          Plan ultrasound to look for pelvic masses.  I will contact her with the results and follow up recommendations.     Chief Complaint     Chief Complaint   Patient presents with    Pelvic Pain       HPI     Maria Del Rosario Ward is a  28 year old female who is seen for pelvic pain.      Started getting dizzy, nauseated about a week ago and then the pelvic pain started a few days ago.  Was standing when she had a vaso vagal type reaction.  Then she started having a heavy bloated feeling with pain radiating down her legs.  On Friday, she went to  and then the ED.  She was not seen in the ED because she waited for three hours.  She has not had an ultrasound. Labs normal.  Gc and chlamydia pending wet prep negative, normal CBC.      Bowel function has been abnormal.  Using the bathroom more often, loose stools.  Having 2-3 movements per day. Typically has one bm per day, same time.  No blood or other abnormality, just soft.  No sick exposures.     Sharp pains are occurring more often.  Before it was mainly just a pressure.  Stools are about the same.    Pain is in the middle and the sides.  Not favoring one side or the other.     Having more discharge.  No itching, burning.      No LMP recorded. (Menstrual status: Irregular Periods).     LMP two weeks ago .  Periods are every 28-48 days.  Skips 2-3 periods per year.     Problem List     Patient Active Problem List    Diagnosis Date Noted    Attention deficit hyperactivity disorder (ADHD), unspecified ADHD type 2023     Priority: Medium     Patient is followed by MANOLO DIAZ for ongoing prescription of stimulants.  All refills should be approved by this provider, or covering partner.    Medication(s): Adderall XR 25 mg,  Adderall 10 mg.   Maximum quantity per month: 30, 45  Clinic visit frequency required: Q 6  months     Controlled substance agreement on file: Yes       Date(s): 8/2/23  Neuropsych evaluation for ADD completed:  Yes, completed 3/9/23, on file and diagnosis confirmed  Dr. Moore, psychiatrist, [facility] website verification:  done on 8/2/23  [email or web address]        Moderate persistent asthma without complication 08/02/2023     Priority: Medium    Major depressive disorder, recurrent episode, moderate (H) 03/23/2023     Priority: Medium    PCOS (polycystic ovarian syndrome) 08/17/2022     Priority: Medium    Cysts of both ovaries 09/24/2020     Priority: Medium     Multiple small follicles bilaterally without a diagnosis of PCOS      Low vitamin D level 04/27/2018     Priority: Medium    Family history of von Willebrand disease 04/26/2018     Priority: Medium    Family history of factor V deficiency 04/26/2018     Priority: Medium    KEISHA (generalized anxiety disorder) 08/11/2016     Priority: Medium    Atopic rhinitis 01/29/2008     Priority: Medium     Overview:   by history to dogs and cats         Medications     Current Outpatient Medications   Medication    albuterol (PROAIR HFA/PROVENTIL HFA/VENTOLIN HFA) 108 (90 Base) MCG/ACT inhaler    amphetamine-dextroamphetamine (ADDERALL XR) 25 MG 24 hr capsule    amphetamine-dextroamphetamine (ADDERALL) 20 MG tablet    budesonide-formoterol (SYMBICORT) 80-4.5 MCG/ACT Inhaler    escitalopram (LEXAPRO) 20 MG tablet    hydrOXYzine (VISTARIL) 25 MG capsule    levonorgestrel-ethinyl estradiol (NORDETTE) 0.15-30 MG-MCG tablet    loratadine (CLARITIN) 10 MG tablet    nicotine (COMMIT) 2 MG lozenge    varenicline (CHANTIX CONTINUING MONTH ALEJANDRA) 1 MG tablet    varenicline (CHANTIX) 0.5 MG tablet     No current facility-administered medications for this visit.        Allergies     Allergies   Allergen Reactions    Sulfa Antibiotics     Tobramycin  Swelling     Eyes swelled    Topamax [Topiramate]        Past Medical/Surgical History     Past Medical History:   Diagnosis Date    Atopic rhinitis 1/29/2008    Overview:  by history to dogs and cats    Chlamydia 2018    KEISHA (generalized anxiety disorder) 8/11/2016       Past Surgical History:   Procedure Laterality Date    COMBINED ESOPHAGOSCOPY, GASTROSCOPY, DUODENOSCOPY (EGD) WITH CO2 INSUFFLATION Bilateral 7/25/2016    Procedure: COMBINED ESOPHAGOSCOPY, GASTROSCOPY, DUODENOSCOPY (EGD) WITH CO2 INSUFFLATION;  Surgeon: Warren Fernandez MD;  Location: [facility] OR    ESOPHAGOSCOPY, GASTROSCOPY, DUODENOSCOPY (EGD), COMBINED N/A 7/25/2016    Procedure: COMBINED ESOPHAGOSCOPY, GASTROSCOPY, DUODENOSCOPY (EGD), BIOPSY SINGLE OR MULTIPLE;  Surgeon: Warren Fernandez MD;  Location: [facility] OR    LAPAROSCOPIC CHOLECYSTECTOMY  2016    TONSILLECTOMY      trigger thumb Right 1997        Social History     Social History     Socioeconomic History    Marital status: Single     Spouse name: partnerMarlene Crews    Number of children: 1    Years of education: Not on file    Highest education level: Not on file   Occupational History    Occupation: /[employer]   Tobacco Use    Smoking status: Former     Types: Cigarettes     Quit date: 10/27/2020     Years since quitting: 3.2    Smokeless tobacco: Never   Vaping Use    Vaping Use: Every day   Substance and Sexual Activity    Alcohol use: No    Drug use: No    Sexual activity: Yes     Partners: Male   Other Topics Concern    Parent/sibling w/ CABG, MI or angioplasty before 65F 55M? Not Asked   Social History Narrative    Not on file     Social Determinants of Health     Financial Resource Strain: Not on file   Food Insecurity: Not on file   Transportation Needs: Not on file   Physical Activity: Not on file   Stress: Not on file   Social Connections: Not on file   Interpersonal Safety: Not on file   Housing Stability: Not on file       Family History      History reviewed. No  "pertinent family history.    ROS     Pertinent positives and negatives are listed in the HPI.     Physical Exam   Vitals: BP (!) 146/89   Ht 1.6 m (5' 3\")   Wt 83.1 kg (183 lb 1.6 oz)   BMI 32.43 kg/m      General: Comfortable, no obvious distress   : Normal female external genitalia.  No lesions.  Urethral meatus normal.  Speculum exam reveals a normal vaginal vault, normal cervix.  No lesions.  No abnormal discharge.  Bimanual exam reveals a normal, mobile uterus.  Minimal cervical motion tenderness.  She is generally tender on exam, but no discrete masses or fullness.         Labs/Imaging       I have personally reviewed the labs/imaging and findings were:    2/2/24:   WBC 10.3  Hgb 15  Platelets 327  BMP wnl  UA neg  Wet prep with no evidence of Clue Cells, yeast, or trich  Hcg urine neg    Normal US in 2023, aside from features consistent with PCOS      24 min spent on the date of the encounter in chart review, patient visit, review of tests, documentation about the issues documented above.     Sara Cyr MD       "

## 2024-02-05 NOTE — PATIENT INSTRUCTIONS
If you have labs or imaging done, the results will automatically release in [employer] without an interpretation.  Your health care professional will review those results and send an interpretation with recommendations as soon as possible, but this may be 1-3 business days.    If you have any questions regarding your visit, please contact your care team.     [employer] Access Services: [phone]  [facility] CLINIC HOURS TELEPHONE NUMBER       MD Licha Menon - Certified Medical Assistant     Lauryn- LEAD RN  Gayathri-FRANCIA Mckinney-FRANCIA Reyes-  Dona-     Monday- [place]  8:00 a.m - 5:00 p.m    Tuesday- Surgery        Thursday- [place]  8:00 a.m - 5:00 p.m.    Friday- [place]  7:30 a.m - 4:00 p.m. [facility]  [address]  [place], [place] [address] [phone] Fax  [phone] Phone  Imaging Scheduling [phone]    [facility]  [address] [place] [address] [address].  [place], [place] [address] [phone]    [facility]  [address], [place] [id number] [phone] Phone  [phone] Fax  Imaging Scheduling [phone]     Urgent Care locations:  [facility] Monday-Friday  10 am - 8 pm  Saturday and Sunday   9 am - 5 pm  Monday-Friday   10 am - 8 pm  Saturday and Sunday   9 am - 5 pm   [phone]     **Surgeries** Our Surgery Schedulers will contact you to schedule. If you do not receive a call within 3 business days, please call [phone].    If you need a medication refill, please contact your pharmacy. Please allow 3 business days for your refill to be completed.    As always, thank you for trusting us with your healthcare needs!

## 2024-02-07 ENCOUNTER — ANCILLARY PROCEDURE (OUTPATIENT)
Dept: ULTRASOUND IMAGING | Facility: CLINIC | Age: 29
End: 2024-02-07
Attending: OBSTETRICS & GYNECOLOGY
Payer: COMMERCIAL

## 2024-02-07 ENCOUNTER — MYC MEDICAL ADVICE (OUTPATIENT)
Dept: OBGYN | Facility: CLINIC | Age: 29
End: 2024-02-07

## 2024-02-07 DIAGNOSIS — R10.2 PELVIC PAIN IN FEMALE: ICD-10-CM

## 2024-02-07 PROCEDURE — 76856 US EXAM PELVIC COMPLETE: CPT | Mod: TC | Performed by: RADIOLOGY

## 2024-02-07 PROCEDURE — 76830 TRANSVAGINAL US NON-OB: CPT | Mod: TC | Performed by: RADIOLOGY

## 2024-02-10 ENCOUNTER — HEALTH MAINTENANCE LETTER (OUTPATIENT)
Age: 29
End: 2024-02-10

## 2024-02-14 ENCOUNTER — VIRTUAL VISIT (OUTPATIENT)
Dept: PSYCHIATRY | Facility: CLINIC | Age: 29
End: 2024-02-14
Payer: COMMERCIAL

## 2024-02-14 ENCOUNTER — VIRTUAL VISIT (OUTPATIENT)
Dept: BEHAVIORAL HEALTH | Facility: CLINIC | Age: 29
End: 2024-02-14
Payer: COMMERCIAL

## 2024-02-14 DIAGNOSIS — F41.9 ANXIETY: ICD-10-CM

## 2024-02-14 DIAGNOSIS — F41.1 GAD (GENERALIZED ANXIETY DISORDER): Primary | ICD-10-CM

## 2024-02-14 DIAGNOSIS — F90.9 ATTENTION DEFICIT HYPERACTIVITY DISORDER (ADHD), UNSPECIFIED ADHD TYPE: Primary | ICD-10-CM

## 2024-02-14 DIAGNOSIS — F33.2 SEVERE EPISODE OF RECURRENT MAJOR DEPRESSIVE DISORDER, WITHOUT PSYCHOTIC FEATURES (H): ICD-10-CM

## 2024-02-14 DIAGNOSIS — F33.1 MAJOR DEPRESSIVE DISORDER, RECURRENT EPISODE, MODERATE (H): ICD-10-CM

## 2024-02-14 DIAGNOSIS — F90.9 ATTENTION DEFICIT HYPERACTIVITY DISORDER (ADHD), UNSPECIFIED ADHD TYPE: ICD-10-CM

## 2024-02-14 DIAGNOSIS — F41.1 GAD (GENERALIZED ANXIETY DISORDER): ICD-10-CM

## 2024-02-14 PROCEDURE — 99214 OFFICE O/P EST MOD 30 MIN: CPT | Mod: 95 | Performed by: PSYCHIATRY & NEUROLOGY

## 2024-02-14 PROCEDURE — 90832 PSYTX W PT 30 MINUTES: CPT | Mod: 95 | Performed by: COUNSELOR

## 2024-02-14 RX ORDER — DEXTROAMPHETAMINE SACCHARATE, AMPHETAMINE ASPARTATE, DEXTROAMPHETAMINE SULFATE AND AMPHETAMINE SULFATE 3.75; 3.75; 3.75; 3.75 MG/1; MG/1; MG/1; MG/1
15 TABLET ORAL DAILY
Qty: 30 TABLET | Refills: 0 | Status: SHIPPED | OUTPATIENT
Start: 2024-02-14 | End: 2024-03-15

## 2024-02-14 RX ORDER — DEXTROAMPHETAMINE SACCHARATE, AMPHETAMINE ASPARTATE, DEXTROAMPHETAMINE SULFATE AND AMPHETAMINE SULFATE 3.75; 3.75; 3.75; 3.75 MG/1; MG/1; MG/1; MG/1
15 TABLET ORAL DAILY
Qty: 30 TABLET | Refills: 0 | Status: SHIPPED | OUTPATIENT
Start: 2024-03-16 | End: 2024-04-15

## 2024-02-14 RX ORDER — DEXTROAMPHETAMINE SACCHARATE, AMPHETAMINE ASPARTATE, DEXTROAMPHETAMINE SULFATE AND AMPHETAMINE SULFATE 5; 5; 5; 5 MG/1; MG/1; MG/1; MG/1
20 TABLET ORAL
Qty: 30 TABLET | Refills: 0 | Status: SHIPPED | OUTPATIENT
Start: 2024-04-16 | End: 2024-05-16

## 2024-02-14 RX ORDER — DEXTROAMPHETAMINE SACCHARATE, AMPHETAMINE ASPARTATE, DEXTROAMPHETAMINE SULFATE AND AMPHETAMINE SULFATE 5; 5; 5; 5 MG/1; MG/1; MG/1; MG/1
20 TABLET ORAL EVERY MORNING
Qty: 30 TABLET | Refills: 0 | Status: SHIPPED | OUTPATIENT
Start: 2024-03-16 | End: 2024-04-15

## 2024-02-14 RX ORDER — DEXTROAMPHETAMINE SACCHARATE, AMPHETAMINE ASPARTATE, DEXTROAMPHETAMINE SULFATE AND AMPHETAMINE SULFATE 5; 5; 5; 5 MG/1; MG/1; MG/1; MG/1
20 TABLET ORAL EVERY MORNING
Qty: 30 TABLET | Refills: 0 | Status: SHIPPED | OUTPATIENT
Start: 2024-02-14 | End: 2024-03-15

## 2024-02-14 RX ORDER — ESCITALOPRAM OXALATE 20 MG/1
20 TABLET ORAL DAILY
Qty: 90 TABLET | Refills: 1 | Status: SHIPPED | OUTPATIENT
Start: 2024-02-14

## 2024-02-14 RX ORDER — DEXTROAMPHETAMINE SACCHARATE, AMPHETAMINE ASPARTATE, DEXTROAMPHETAMINE SULFATE AND AMPHETAMINE SULFATE 3.75; 3.75; 3.75; 3.75 MG/1; MG/1; MG/1; MG/1
15 TABLET ORAL DAILY
Qty: 30 TABLET | Refills: 0 | Status: SHIPPED | OUTPATIENT
Start: 2024-04-16 | End: 2024-05-16

## 2024-02-14 ASSESSMENT — ANXIETY QUESTIONNAIRES
1. FEELING NERVOUS, ANXIOUS, OR ON EDGE: MORE THAN HALF THE DAYS
GAD7 TOTAL SCORE: 12
2. NOT BEING ABLE TO STOP OR CONTROL WORRYING: SEVERAL DAYS
IF YOU CHECKED OFF ANY PROBLEMS ON THIS QUESTIONNAIRE, HOW DIFFICULT HAVE THESE PROBLEMS MADE IT FOR YOU TO DO YOUR WORK, TAKE CARE OF THINGS AT HOME, OR GET ALONG WITH OTHER PEOPLE: VERY DIFFICULT
GAD7 TOTAL SCORE: 12
GAD7 TOTAL SCORE: 12
7. FEELING AFRAID AS IF SOMETHING AWFUL MIGHT HAPPEN: NOT AT ALL
7. FEELING AFRAID AS IF SOMETHING AWFUL MIGHT HAPPEN: NOT AT ALL
2. NOT BEING ABLE TO STOP OR CONTROL WORRYING: SEVERAL DAYS
5. BEING SO RESTLESS THAT IT IS HARD TO SIT STILL: MORE THAN HALF THE DAYS
GAD7 TOTAL SCORE: 12
3. WORRYING TOO MUCH ABOUT DIFFERENT THINGS: NEARLY EVERY DAY
5. BEING SO RESTLESS THAT IT IS HARD TO SIT STILL: MORE THAN HALF THE DAYS
6. BECOMING EASILY ANNOYED OR IRRITABLE: SEVERAL DAYS
3. WORRYING TOO MUCH ABOUT DIFFERENT THINGS: NEARLY EVERY DAY
4. TROUBLE RELAXING: NEARLY EVERY DAY
1. FEELING NERVOUS, ANXIOUS, OR ON EDGE: MORE THAN HALF THE DAYS
IF YOU CHECKED OFF ANY PROBLEMS ON THIS QUESTIONNAIRE, HOW DIFFICULT HAVE THESE PROBLEMS MADE IT FOR YOU TO DO YOUR WORK, TAKE CARE OF THINGS AT HOME, OR GET ALONG WITH OTHER PEOPLE: VERY DIFFICULT
7. FEELING AFRAID AS IF SOMETHING AWFUL MIGHT HAPPEN: NOT AT ALL
GAD7 TOTAL SCORE: 12
6. BECOMING EASILY ANNOYED OR IRRITABLE: SEVERAL DAYS
4. TROUBLE RELAXING: NEARLY EVERY DAY
7. FEELING AFRAID AS IF SOMETHING AWFUL MIGHT HAPPEN: NOT AT ALL
8. IF YOU CHECKED OFF ANY PROBLEMS, HOW DIFFICULT HAVE THESE MADE IT FOR YOU TO DO YOUR WORK, TAKE CARE OF THINGS AT HOME, OR GET ALONG WITH OTHER PEOPLE?: VERY DIFFICULT
8. IF YOU CHECKED OFF ANY PROBLEMS, HOW DIFFICULT HAVE THESE MADE IT FOR YOU TO DO YOUR WORK, TAKE CARE OF THINGS AT HOME, OR GET ALONG WITH OTHER PEOPLE?: VERY DIFFICULT
GAD7 TOTAL SCORE: 12

## 2024-02-14 ASSESSMENT — COLUMBIA-SUICIDE SEVERITY RATING SCALE - C-SSRS
6. HAVE YOU EVER DONE ANYTHING, STARTED TO DO ANYTHING, OR PREPARED TO DO ANYTHING TO END YOUR LIFE?: NO
TOTAL  NUMBER OF INTERRUPTED ATTEMPTS LIFETIME: NO
1. HAVE YOU WISHED YOU WERE DEAD OR WISHED YOU COULD GO TO SLEEP AND NOT WAKE UP?: NO
ATTEMPT LIFETIME: NO
2. HAVE YOU ACTUALLY HAD ANY THOUGHTS OF KILLING YOURSELF?: NO
TOTAL  NUMBER OF ABORTED OR SELF INTERRUPTED ATTEMPTS LIFETIME: NO

## 2024-02-14 NOTE — PROGRESS NOTES
[facility] Collaborative Care Psychiatry Services [facility]  February 14, 2024      Behavioral Health Clinician Progress Note    Patient Name: Maria Del Rosario Ward           Service Type:  Individual      Service Location:   MyChart / Email (patient reached)     Session Start Time: 233pm  Session End Time: 259pm      Session Length: 16 - 37      Attendees: Patient     Service Modality:  Video Visit:      Provider verified identity through the following two step process.  Patient provided:  Patient photo and Patient was verified at admission/transfer    Telemedicine Visit: The patient's condition can be safely assessed and treated via synchronous audio and visual telemedicine encounter.      Reason for Telemedicine Visit: Patient has requested telehealth visit    Originating Site (Patient Location): Patient's home    Distant Site (Provider Location): [facility] MENTAL [facility] & ADDICTION [facility]    Consent:  The patient/guardian has verbally consented to: the potential risks and benefits of telemedicine (video visit) versus in person care; bill my insurance or make self-payment for services provided; and responsibility for payment of non-covered services.     Patient would like the video invitation sent by:  My Chart    Mode of Communication:  Video Conference via [facility]    Distant Location (Provider):  On-site    As the provider I attest to compliance with applicable laws and regulations related to telemedicine.    Visit Activities (Refresh list every visit): [facility] Only    Diagnostic Assessment Date: in the next few visits  Treatment Plan Review Date: will be developed once the DA is completed  See Flowsheets for today's PHQ-9 and KEISHA-7 results  Previous PHQ-9:       1/14/2023     9:31 AM 5/10/2023    10:11 AM 8/2/2023    10:43 AM   PHQ-9 SCORE   PHQ-9 Total Score Tan 22 (Severe depression) 11 (Moderate depression) 4 (Minimal depression)   PHQ-9 Total Score 22 11 4   Will be updated next  visit due to focusing on sx and current stressors.     Previous KEISHA-7:       1/14/2023     9:31 AM 3/8/2023     3:38 PM 2/14/2024     2:25 PM   KEISHA-7 SCORE   Total Score 17 (severe anxiety) 8 (mild anxiety) 12 (moderate anxiety)   Total Score 17 8 12    12       HAILY LEVEL:      12/29/2014     3:00 PM 8/11/2022    10:00 AM   HAILY Score (Last Two)   HAILY Raw Score 39 40   Activation Score 56.4 100   HAILY Level 3 4       DATA  Extended Session (60+ minutes): No  Interactive Complexity: No  Crisis: No  [facility] Patient: No    Treatment Objective(s) Addressed in This Session:  Target Behavior(s):  improve ADHD sx, anxiety and depression and continue self care    Depressed Mood: Decrease frequency and intensity of feeling down, depressed, hopeless  Improve quantity and quality of night time sleep / decrease daytime naps  Feel less tired and more energy during the day   Improve concentration, focus, and mindfulness in daily activities   Anxiety: will experience a reduction in anxiety and will increase ability to function adaptively  Attention Problems: will develop coping skills to effectively manage attention issues    Current Stressors / Issues:  Medication Questions/Requests: no     update: Pt reports that they wonder if their body is adjusted to ADHD medication. She says that there is a difference if she does take her medication. Try to do lists and sometimes do them. Pt has a lot going on currently. Anxiety, pt feels like it is situational. Depression, pt hasn't cried in a long time an pt is feeling more sad lately. Irritability is no more than usual. Pt has been able to get sunlight. She is keeping up hygiene.     Side Effects: no    Appetite: struggle to eat daily, had first food today at 145pm, will force self to eat   Sleep: last 2 nights non-existent, is avg sleep, wake up once a night or maybe more, don't get out of bed or toss or turn    Suicidality: no in lifetime  Self-harm: no in lifetime  Substance Use: tobacco,  cigarettes   Caffeine: daily caffeine drinker  Therapist: seeing therapist 3 years, she is helpful, can tell her what is going on and get outside perspective, saw her earlier today  Interventions:         Progress on Treatment Objective(s) / Homework:  Satisfactory progress - ACTION (Actively working towards change); Intervened by reinforcing change plan / affirming steps taken    Motivational Interviewing    MI Intervention: Expressed Empathy/Understanding, Supported Autonomy, Collaboration, Evocation, Permission to raise concern or advise, Open-ended questions, Reflections: simple and complex, Change talk (evoked), and Reframe     Change Talk Expressed by the Patient: Committment to change Activation Taking steps    Provider Response to Change Talk: E - Evoked more info from patient about behavior change, A - Affirmed patient's thoughts, decisions, or attempts at behavior change, R - Reflected patient's change talk, and S - Summarized patient's change talk statements    Also provided psychoeducation about behavioral health condition, symptoms, and treatment options    Assessments completed prior to visit:  The following assessments were completed by patient for this visit:  PHQ2:       3/8/2023     3:39 PM 5/10/2022    10:45 AM 12/2/2021     1:12 PM 6/17/2021     9:24 AM 3/26/2019    10:54 AM 8/29/2017    11:05 AM 8/9/2016    10:50 AM   PHQ-2 ( 1999 [employer])   Q1: Little interest or pleasure in doing things 1 0 2 0 1 0 0   Q2: Feeling down, depressed or hopeless 1 0 1 0 1 0 0   PHQ-2 Score 2 0 3 0 2 0 0   PHQ-2 Total Score (12-17 Years)- Positive if 3 or more points; Administer PHQ-A if positive    0 2     Q1: Little interest or pleasure in doing things Several days Not at all More than half the days  Several days     Q2: Feeling down, depressed or hopeless Several days Not at all Several days  Several days     PHQ-2 Score 2 0 3  2       GAD2:       3/22/2023     8:11 PM 5/24/2023    11:21 AM 2/14/2024     2:25 PM    KEISHA-2   Feeling nervous, anxious, or on edge 1 0 2   Not being able to stop or control worrying 1 0 1   KEISHA-2 Total Score 2 0 3    3     PROMIS 10-Global Health (only subscores and total score):       3/8/2023     3:50 PM 2/14/2024     2:27 PM   PROMIS-10 Scores Only   Global Mental Health Score 7 7    7   Global Physical Health Score 11 12    12   PROMIS TOTAL - SUBSCORES 18 19    19       Care Plan review completed: No    Medication Review:  No changes to current psychiatric medication(s)    Medication Compliance:  Yes    Changes in Health Issues:   Yes:   uterine and stomach concerns     Chemical Use Review:   Substance Use: Chemical use reviewed, no active concerns identified      Tobacco Use: No change in amount of tobacco use since last session.  Is aware of resources.    Assessment: Current Emotional / Mental Status (status of significant symptoms):  Risk status (Self / Other harm or suicidal ideation)  Patient denies a history of suicidal ideation, suicide attempts, self-injurious behavior, homicidal ideation, homicidal behavior, and and other safety concerns  Patient denies current fears or concerns for personal safety.  Patient denies current or recent suicidal ideation or behaviors.  Patient denies current or recent homicidal ideation or behaviors.  Patient denies current or recent self injurious behavior or ideation.  Patient denies other safety concerns.  A safety and risk management plan has not been developed at this time, however patient was encouraged to call [place] [phone] should there be a change in any of these risk factors.    Appearance:   Appropriate   Eye Contact:   Good   Psychomotor Behavior: Normal   Attitude:   Cooperative   Orientation:   All  Speech   Rate / Production: Normal    Volume:  Normal   Mood:    Anxious  Sad   Affect:    Appropriate   Thought Content:  Clear   Thought Form:  Coherent  Logical   Insight:    Good     Diagnoses:  1. KEISHA (generalized anxiety disorder)     2. Major depressive disorder, recurrent episode, moderate (H)    3. Attention deficit hyperactivity disorder (ADHD), unspecified ADHD type      Collateral Reports Completed:  Communicated with: Timbo Moore M.D.     Plan: (Homework, other):  Patient was given information about behavioral services and encouraged to schedule a follow up appointment with the clinic [facility] in 1 month.  She was also given information about mental health symptoms and treatment options .  CD Recommendations: No indications of CD issues.     PETE AlfonsoSW

## 2024-02-14 NOTE — PATIENT INSTRUCTIONS
"Patient Education   Collaborative Care Psychiatry Service  What to Expect  Here's what to expect from your Collaborative Care Psychiatry Service (CCPS).   About CCPS  CCPS means 2 people work together to help you get better. You'll meet with a behavioral health clinician and a psychiatric doctor. A behavioral health clinician helps people with mental health problems by talking with them. A psychiatric doctor helps people by giving them medicine.  How it works  At every visit, you'll see the behavioral health clinician (BHC) first. They'll talk with you about how you're doing and teach you how to feel better.   Then you'll see the psychiatric doctor. This doctor can help you deal with troubling thoughts and feelings by giving you medicine. They'll make sure you know the plan for your care.   CCPS usually takes 3 to 6 visits. If you need more visits, we may have you start seeing a different psychiatric doctor for ongoing care.  If you have any questions or concerns, we'll be glad to talk with you.  About visits  Be open  At your visits, please talk openly about your problems. It may feel hard, but it's the best way for us to help you.  Cancelling visits  If you can't come to your visit, please call us right away at [phone]. If you don't cancel at least 24 hours (1 full day) before your visit, that's \"late cancellation.\"  Being late to visits  Being very late is the same as not showing up. You will be a \"no show\" if:  Your appointment starts with a BHC, and you're more than 15 minutes late for a 30-minute (half hour) visit. This will also cancel your appointment with the psychiatric doctor.  Your appointment is with a psychiatric doctor only, and you're more than 15 minutes late for a 30-minute (half hour) visit.  Your appointment is with a psychiatric doctor only, and you're more than 30 minutes late for a 60-minute (full hour) visit.  If you cancel late or don't show up 2 times within 6 months, we may end your " care.   Getting help between visits  If you need help between visits, you can call us Monday to Friday from 8 a.m. to 4:30 p.m. at [phone].  Emergency care  Call 911 or go to the nearest emergency department if your life or someone else's life is in danger.  Call 988 anytime to reach the national Suicide and Crisis hotline.  Medicine refills  To refill your medicine, call your pharmacy. You can also call [facility]'s Behavioral Access at [phone], Monday to Friday, 8 a.m. to 4:30 p.m. It can take 1 to 3 business days to get a refill.   Forms, letters, and tests  You may have papers to fill out, like FMLA, short-term disability, and workability. We can help you with these forms at your visits, but you must have an appointment. You may need more than 1 visit for this, to be in an intensive therapy program, or both.  Before we can give you medicine for ADHD, we may refer you to get tested for it or confirm it another way.  We may not be able to give you an emotional support animal letter.  We don't do mental health checks ordered by the court.   We don't do mental health testing, but we can refer you to get tested.   Thank you for choosing us for your care.  For informational purposes only. Not to replace the advice of your health care provider. Copyright   2022 [facility]. All rights reserved. [employer] [id number] - 12/22.

## 2024-02-14 NOTE — PROGRESS NOTES
Virtual Visit Details    Type of service:  Video Visit   Video Start Time: 3:12 PM  Video End Time:3:32 PM    Originating Location (pt. Location): Home    Distant Location (provider location):  On-site  Platform used for Video Visit: [facility] Psychiatry Consult Note    IDENTIFICATION   Name: Maria Del Rosario Ward   : 1995/28 year old      Sex:    @ female          Telemedicine Visit: The patient's condition can be safely assessed and treated via synchronous audio and visual telemedicine encounter.        Face to Face/patient Contact total time: 20 minutes  Pre Charting time: 2 minutes; Post charting time, communication and other activities: 2 minutes; Total time 24 minutes  3:08 PM          SUBJECTIVE   Adderall is less effective. It is harder to initiate and complete tasks. Back to making lists and not getting through it. Completes 50% of tasks in terms of going from start to finish.     Gets up at 7am, and takes right away adderall xr 25 mg-  helps to start things, but not getting a productive feeling until the PM adderall dose. Has been taking 10 mg dose. Takes 10 mg ir between 11-12.  Finds this to be effective. Struggles to get going in the morning until taking pm 10 mg ir - lasts from 11-12p through dinner.     Adderall XR 25 - made by [employer]  Adderall IR 20 mg - made by [facility]      The following assessments were completed by patient for this visit:  PROMIS 10-Global Health (all questions and answers displayed):       3/8/2023     3:50 PM 2024     2:27 PM   PROMIS 10   In general, would you say your health is: Good Good   In general, would you say your quality of life is: Fair Good   In general, how would you rate your physical health? Fair Good   In general, how would you rate your mental health, including your mood and your ability to think? Poor Poor   In general, how would you rate your satisfaction with your social activities and relationships? Fair Poor   In general, please rate how  well you carry out your usual social activities and roles Poor Fair   To what extent are you able to carry out your everyday physical activities such as walking, climbing stairs, carrying groceries, or moving a chair? A little A little   In the past 7 days, how often have you been bothered by emotional problems such as feeling anxious, depressed, or irritable? Often Often   In the past 7 days, how would you rate your fatigue on average? Severe Moderate   In the past 7 days, how would you rate your pain on average, where 0 means no pain, and 10 means worst imaginable pain? 0 2   In general, would you say your health is: 3 3   In general, would you say your quality of life is: 2 3   In general, how would you rate your physical health? 2 3   In general, how would you rate your mental health, including your mood and your ability to think? 1 1   In general, how would you rate your satisfaction with your social activities and relationships? 2 1   In general, please rate how well you carry out your usual social activities and roles. (This includes activities at home, at work and in your community, and responsibilities as a parent, child, spouse, employee, friend, etc.) 1 2   To what extent are you able to carry out your everyday physical activities such as walking, climbing stairs, carrying groceries, or moving a chair? 2 2   In the past 7 days, how often have you been bothered by emotional problems such as feeling anxious, depressed, or irritable? 4 4   In the past 7 days, how would you rate your fatigue on average? 4 3   In the past 7 days, how would you rate your pain on average, where 0 means no pain, and 10 means worst imaginable pain? 0 2   Global Mental Health Score 7 7    7   Global Physical Health Score 11 12    12   PROMIS TOTAL - SUBSCORES 18 19    19             1/14/2023     9:31 AM 5/10/2023    10:11 AM 8/2/2023    10:43 AM   PHQ   PHQ-9 Total Score 22 11 4   Q9: Thoughts of better off dead/self-harm past 2  weeks Not at all Not at all Not at all          1/14/2023     9:31 AM 3/8/2023     3:38 PM 2/14/2024     2:25 PM   KEISHA-7 SCORE   Total Score 17 (severe anxiety) 8 (mild anxiety) 12 (moderate anxiety)   Total Score 17 8 12    12        OBJECTIVE     Vital Signs:   There were no vitals taken for this visit.    Labs:  na    Current Medications:  Current Outpatient Medications   Medication    albuterol (PROAIR HFA/PROVENTIL HFA/VENTOLIN HFA) 108 (90 Base) MCG/ACT inhaler    amphetamine-dextroamphetamine (ADDERALL XR) 25 MG 24 hr capsule    amphetamine-dextroamphetamine (ADDERALL) 20 MG tablet    budesonide-formoterol (SYMBICORT) 80-4.5 MCG/ACT Inhaler    escitalopram (LEXAPRO) 20 MG tablet    hydrOXYzine (VISTARIL) 25 MG capsule    levonorgestrel-ethinyl estradiol (NORDETTE) 0.15-30 MG-MCG tablet    loratadine (CLARITIN) 10 MG tablet    nicotine (COMMIT) 2 MG lozenge    varenicline (CHANTIX CONTINUING MONTH ALEJANDRA) 1 MG tablet    varenicline (CHANTIX) 0.5 MG tablet     No current facility-administered medications for this visit.        The [place] Prescription Monitoring Program has been reviewed and there are no concerns about diversionary activity for controlled substances at this time.        ADDED HISTORY   na    MENTAL STATUS EXAMINATION:   Appearance: Intact to attention to grooming and hygiene  Attitude: Cooperative  Eye Contact: Fair  Gait and Station: Sitting and standing at separate times  Psychomotor Behavior: Overall slightly active  Oriented to: Grossly person place and time  Attention Span and Concentration: Grossly intact  Speech: Mildly depressed tone  Language: English  Mood:  good  Affect: euthymic  Associations:  no loose associations  Thought Process:  logical, linear and goal oriented  Thought Content: No evidence of delusions or suicidal or homicidal ideation plan or intent  Memory: Grossly intact  Fund of Knowledge: Grossly intact  Insight:  good  Judgment:  intact, adequate for safety  Impulse  Control:  intact        DIAGNOSES:   ADHD, unspecified type  Major Depressive Disorder, moderate, recurrent, in partial remission  Unspecified anxiety disorder  By history PTSD  Insomnia, unspecified type      ASSESSMENT:   Patient with history of anxiety, depression and trauma with reported prior diagnosis of PTSD with sufficient trigger.  Has consistent symptoms pretrauma and from early on that are consistent with ADHD and may not be influenced by depression/anxiety given continuity and chronicity.  Meets criteria for ADHD. Treatment very effective.     Today Maria Del Rosario Ward reports  no suicidal ideations. In addition, she has notable risk factors for self-harm, including anxiety. However, risk is mitigated by commitment to family and absence of past attempts. Therefore, based on all available evidence including the factors cited above, she does not appear to be at imminent risk for self-harm, does not meet criteria for a 72-hr hold, and therefore remains appropriate for ongoing outpatient level of care.       PLAN:     Restarted consult  Does not meet criteria for involuntary treatment or hospitalization  Adderall 10 mg p.o. twice daily efficacy after second tablet x6 hours, low appetite but manageable => 3/23/2023 XR 25 mg po qam/IR 10-15 mg po qpm prn adhd degree of tolerance, second dose seems to be stacking and working better than initial dose => 2/14/2024 IR 20 mg po qam/15 mg po qpm may need immediate release 30 mg a.m. dose-Risks, benefits and alternatives discussed.  Patient provides verbal consent to treatment.  Previously advised of heart effects, discontinue for any heart related effects.  Advised of addiction, insomnia.  Escitalopram 20 mg daily  On Chantix  Hydroxyzine 25 mg p.o. 3 times daily as needed anxiety  Behavioral sleep interventions - efficacious   Bedtime - 10:00PM; the earliest time to go to bed if actually sleepy  Waketime - 5ish AM; no sleep after   No sleep outside of  schedule  Stimulus Control  -our brain can get mixed up with what bed is for. the brain can associate the bed with alertness, thinking, anxiety, etc.  Sometimes we need to re train the brain that bed is for sleeping. Here's how to do it:  -if waking up in the middle of the night and think you cannot fall asleep, recommend leaving the bed, relaxing, and returning to bed when you feel like you can fall asleep quickly  -We set a recommended bedtime; however, if you are in the middle of the night wide-awake, angry, anxious or thinking a lot we recommend waiting until you feel like you could fall asleep quickly then going to bed.  Return to clinic in 6 weeks    Administrative Billing:   Time spent with patient was greater than 50% of time and/or significant time was spent in counseling and coordination of care regarding above diagnoses and treatment plan. Pre charting time and post charting time/documentation/coordination are done on date of service.      Signed:   Timbo Moore M.D.  [facility] Psychiatry Service    Disclaimer: This note consists of symbols derived from keyboarding, dictation and/or voice recognition software. As a result, there may be errors in the script that have gone undetected. Please consider this when interpreting information found in this chart.    eoc

## 2024-02-14 NOTE — NURSING NOTE
Is the patient currently in the state of [place]? YES    Visit mode:VIDEO    If the visit is dropped, the patient can be reconnected by: VIDEO VISIT: Text to cell phone:   Telephone Information:   Mobile [phone]       Will anyone else be joining the visit? NO  (If patient encounters technical issues they should call [phone])    How would you like to obtain your AVS? MyChart    Are changes needed to the allergy or medication list? Pt stated no changes to allergies and Pt stated no med changes    Reason for visit: JOY QUIÑONES

## 2024-02-14 NOTE — Clinical Note
Candace,  Restarted consultation, degree of tolerance with Adderall and the dosing is acting a little funny with starting basically where the effectiveness does not come until the second dose.  Sincerely, Timbo Moore M.D. Consultative Psychiatrist Program Medical Director, [facility] Psychiatry Service

## 2024-05-31 ENCOUNTER — MYC MEDICAL ADVICE (OUTPATIENT)
Dept: PSYCHIATRY | Facility: CLINIC | Age: 29
End: 2024-05-31
Payer: COMMERCIAL

## 2024-05-31 DIAGNOSIS — F90.9 ATTENTION DEFICIT HYPERACTIVITY DISORDER (ADHD), UNSPECIFIED ADHD TYPE: Primary | ICD-10-CM

## 2024-05-31 RX ORDER — DEXTROAMPHETAMINE SACCHARATE, AMPHETAMINE ASPARTATE, DEXTROAMPHETAMINE SULFATE AND AMPHETAMINE SULFATE 3.75; 3.75; 3.75; 3.75 MG/1; MG/1; MG/1; MG/1
15 TABLET ORAL DAILY
Qty: 30 TABLET | Refills: 0 | Status: SHIPPED | OUTPATIENT
Start: 2024-05-31

## 2024-05-31 RX ORDER — DEXTROAMPHETAMINE SACCHARATE, AMPHETAMINE ASPARTATE, DEXTROAMPHETAMINE SULFATE AND AMPHETAMINE SULFATE 5; 5; 5; 5 MG/1; MG/1; MG/1; MG/1
20 TABLET ORAL EVERY MORNING
Qty: 30 TABLET | Refills: 0 | Status: SHIPPED | OUTPATIENT
Start: 2024-05-31

## 2024-05-31 NOTE — TELEPHONE ENCOUNTER
Refill approved x 30 days only. No further refills will be made until seen in an appointment. This is pending.

## 2024-05-31 NOTE — TELEPHONE ENCOUNTER
"MyC message received -  \"I need a new RX to refill my 20mg and 15mg adderall tablets. I already attempted to refill through the pharmacy but they are requesting a new RX to fill it.\"    Date of Last Office Visit: 2/14/2024  Date of Next Office Visit: nothing scheduled - [facility] notified   No shows since last visit: none  Cancellations since last visit: 3/27/2024    Medication requested: amphetamine-dextroamphetamine (ADDERALL) 15 MG tablet  Date last ordered: 2/14/2024 Qty: 30 Refills: 2 start dates of 3/16 and 4/16/2024  Take 1 tablet (15 mg) by mouth daily       Medication requested: amphetamine-dextroamphetamine (ADDERALL) 20 MG tablet  Date last ordered: 2/14/2024 Qty: 30 Refills: 2 start dates of 3/16 and 41/16/2024  Take 1 tablet (20 mg) by mouth every morning f      Review of [place] ?: Yes  Medication last sold date: both doses sold on 4/28/2024 Qty filled: 30  Other controlled substance on [place] ?: No    Filled  Written  Sold  ID  Drug  QTY  Days  Prescriber  RX #  Dispenser  Refill  Daily Dose*  Pymt Type      04/26/2024 02/14/2024 04/28/2024 1 Dextroamp-Amphetamin 20 Mg Tab 30.00 30 Mi [facility] [id number] [facility] ([id number]) 0/0  [place]icaid [place]   04/24/2024 02/14/2024 04/28/2024 1 Dextroamp-Amphetamin 15 Mg Tab 30.00 30 Mi Kati [id number] Located within [facility] ([id number]) 0/0  [place]   03/28/2024 02/14/2024 03/28/2024 1 Dextroamp-Amphetamin 20 Mg Tab 30.00 30 Mi [facility] [id number] [facility] ([id number]) 0/0  [place]   03/20/2024 02/14/2024 03/27/2024 1 Dextroamp-Amphetamin 15 Mg Tab 30.00 30 Mi K[facility] [id number] [facility] ([id number]) 0/0  [place]   02/14/2024 02/14/2024 02/15/2024 1 Dextroamp-Amphetamin 20 Mg Tab 30.00 30 Mi K[facility] [id number] [facility] ([id number]) 0/0  [place]   02/14/2024 02/14/2024 02/15/2024 1 Dextroamp-Amphetamin 15 Mg Tab 30.00 30 Mi Kati [id number] [facility] ([id number]) 0/0  [place]   01/17/2024 01/17/2024 01/18/2024 1 Dextroamp-Amphet Er 25 Mg Cap 30.00 30 [facility] [id number] [facility] ([id number]) 0/0  [place]   01/10/2024 12/28/2023 01/10/2024 1 Dextroamp-Amphetamin 10 Mg Tab 45.00 30 St " [facility]             Lapse in medication adherence greater than 5 days?: n/a for this medication   If yes, call patient and gather details: n/a  Medication refill request verified as identical to current order?: yes  Result of Last DAM, VPA, Li+ Level, CBC, or Carbamazepine Level (at or since last visit): N/A    Last visit treatment plan:   ASSESSMENT:   Patient with history of anxiety, depression and trauma with reported prior diagnosis of PTSD with sufficient trigger.  Has consistent symptoms pretrauma and from early on that are consistent with ADHD and may not be influenced by depression/anxiety given continuity and chronicity.  Meets criteria for ADHD. Treatment very effective.      Today Maria Del Rosario Ward reports  no suicidal ideations. In addition, she has notable risk factors for self-harm, including anxiety. However, risk is mitigated by commitment to family and absence of past attempts. Therefore, based on all available evidence including the factors cited above, she does not appear to be at imminent risk for self-harm, does not meet criteria for a 72-hr hold, and therefore remains appropriate for ongoing outpatient level of care.         PLAN:      Restarted consult  Does not meet criteria for involuntary treatment or hospitalization  Adderall 10 mg p.o. twice daily efficacy after second tablet x6 hours, low appetite but manageable => 3/23/2023 XR 25 mg po qam/IR 10-15 mg po qpm prn adhd degree of tolerance, second dose seems to be stacking and working better than initial dose => 2/14/2024 IR 20 mg po qam/15 mg po qpm may need immediate release 30 mg a.m. dose-Risks, benefits and alternatives discussed.  Patient provides verbal consent to treatment.  Previously advised of heart effects, discontinue for any heart related effects.  Advised of addiction, insomnia.  Escitalopram 20 mg daily  On Chantix  Hydroxyzine 25 mg p.o. 3 times daily as needed anxiety  Behavioral sleep interventions - efficacious   Bedtime -  10:00PM; the earliest time to go to bed if actually sleepy  Waketime - 5ish AM; no sleep after   No sleep outside of schedule  Stimulus Control  -our brain can get mixed up with what bed is for. the brain can associate the bed with alertness, thinking, anxiety, etc.  Sometimes we need to re train the brain that bed is for sleeping. Here's how to do it:  -if waking up in the middle of the night and think you cannot fall asleep, recommend leaving the bed, relaxing, and returning to bed when you feel like you can fall asleep quickly  -We set a recommended bedtime; however, if you are in the middle of the night wide-awake, angry, anxious or thinking a lot we recommend waiting until you feel like you could fall asleep quickly then going to bed.  Return to clinic in 6 weeks       []Medication refilled per  Medication Refill in Ambulatory Care  policy.  [x]Medication unable to be refilled by RN due to criteria not met as indicated below:    []Eligibility - not seen in the last year   [x]Supervision - no future appointment   [x]Compliance - no shows, cancellations or lapse in therapy   []Verification - order discrepancy   [x]Controlled medication   []Medication not included in policy   []90-day supply request   []Other      The medication is not active on the med list.    Susan Briscoe RN on 5/31/2024 at 9:37 AM

## 2024-07-01 ASSESSMENT — ANXIETY QUESTIONNAIRES
2. NOT BEING ABLE TO STOP OR CONTROL WORRYING: SEVERAL DAYS
7. FEELING AFRAID AS IF SOMETHING AWFUL MIGHT HAPPEN: SEVERAL DAYS
6. BECOMING EASILY ANNOYED OR IRRITABLE: SEVERAL DAYS
GAD7 TOTAL SCORE: 13
GAD7 TOTAL SCORE: 13
7. FEELING AFRAID AS IF SOMETHING AWFUL MIGHT HAPPEN: SEVERAL DAYS
5. BEING SO RESTLESS THAT IT IS HARD TO SIT STILL: MORE THAN HALF THE DAYS
3. WORRYING TOO MUCH ABOUT DIFFERENT THINGS: NEARLY EVERY DAY
8. IF YOU CHECKED OFF ANY PROBLEMS, HOW DIFFICULT HAVE THESE MADE IT FOR YOU TO DO YOUR WORK, TAKE CARE OF THINGS AT HOME, OR GET ALONG WITH OTHER PEOPLE?: SOMEWHAT DIFFICULT
1. FEELING NERVOUS, ANXIOUS, OR ON EDGE: MORE THAN HALF THE DAYS
4. TROUBLE RELAXING: NEARLY EVERY DAY
GAD7 TOTAL SCORE: 13
6. BECOMING EASILY ANNOYED OR IRRITABLE: SEVERAL DAYS
5. BEING SO RESTLESS THAT IT IS HARD TO SIT STILL: MORE THAN HALF THE DAYS
7. FEELING AFRAID AS IF SOMETHING AWFUL MIGHT HAPPEN: SEVERAL DAYS
7. FEELING AFRAID AS IF SOMETHING AWFUL MIGHT HAPPEN: SEVERAL DAYS
GAD7 TOTAL SCORE: 13
IF YOU CHECKED OFF ANY PROBLEMS ON THIS QUESTIONNAIRE, HOW DIFFICULT HAVE THESE PROBLEMS MADE IT FOR YOU TO DO YOUR WORK, TAKE CARE OF THINGS AT HOME, OR GET ALONG WITH OTHER PEOPLE: SOMEWHAT DIFFICULT
GAD7 TOTAL SCORE: 13
IF YOU CHECKED OFF ANY PROBLEMS ON THIS QUESTIONNAIRE, HOW DIFFICULT HAVE THESE PROBLEMS MADE IT FOR YOU TO DO YOUR WORK, TAKE CARE OF THINGS AT HOME, OR GET ALONG WITH OTHER PEOPLE: SOMEWHAT DIFFICULT
1. FEELING NERVOUS, ANXIOUS, OR ON EDGE: MORE THAN HALF THE DAYS
8. IF YOU CHECKED OFF ANY PROBLEMS, HOW DIFFICULT HAVE THESE MADE IT FOR YOU TO DO YOUR WORK, TAKE CARE OF THINGS AT HOME, OR GET ALONG WITH OTHER PEOPLE?: SOMEWHAT DIFFICULT
GAD7 TOTAL SCORE: 13
3. WORRYING TOO MUCH ABOUT DIFFERENT THINGS: NEARLY EVERY DAY
4. TROUBLE RELAXING: NEARLY EVERY DAY
2. NOT BEING ABLE TO STOP OR CONTROL WORRYING: SEVERAL DAYS

## 2024-07-01 ASSESSMENT — PATIENT HEALTH QUESTIONNAIRE - PHQ9
SUM OF ALL RESPONSES TO PHQ QUESTIONS 1-9: 9
SUM OF ALL RESPONSES TO PHQ QUESTIONS 1-9: 9
10. IF YOU CHECKED OFF ANY PROBLEMS, HOW DIFFICULT HAVE THESE PROBLEMS MADE IT FOR YOU TO DO YOUR WORK, TAKE CARE OF THINGS AT HOME, OR GET ALONG WITH OTHER PEOPLE: SOMEWHAT DIFFICULT

## 2024-07-01 NOTE — PROGRESS NOTES
[facility]  July 1, 2024      Behavioral Health Clinician Progress Note    Patient Name: Maria Del Rosario Ward           Service Type:  Individual      Service Location:   [facility] / Email (patient reached)     Session Start Time: 705am  Session End Time: 737am      Session Length: 16 - 37      Attendees: Patient     Service Modality:  Video Visit:      Provider verified identity through the following two step process.  Patient provided:  Patient photo and Patient was verified at admission/transfer    Telemedicine Visit: The patient's condition can be safely assessed and treated via synchronous audio and visual telemedicine encounter.      Reason for Telemedicine Visit: Patient has requested telehealth visit    Originating Site (Patient Location): Patient's home    Distant Site (Provider Location): Provider Remote Setting- Home Office    Consent:  The patient/guardian has verbally consented to: the potential risks and benefits of telemedicine (video visit) versus in person care; bill my insurance or make self-payment for services provided; and responsibility for payment of non-covered services.     Patient would like the video invitation sent by:  My Chart    Mode of Communication:  Video Conference via [facility]    Distant Location (Provider):  On-site    As the provider I attest to compliance with applicable laws and regulations related to telemedicine.    Visit Activities (Refresh list every visit): [facility] Only    Diagnostic Assessment Date: in the next few visits, this is [facility]'s 2nd visit with pt  Treatment Plan Review Date: will be developed once the DA is completed  See Flowsheets for today's PHQ-9 and KEISHA-7 results  Previous PHQ-9:       5/10/2023    10:11 AM 8/2/2023    10:43 AM 7/1/2024    11:09 AM   PHQ-9 SCORE   PHQ-9 Total Score Tan 11 (Moderate depression) 4 (Minimal depression) 9 (Mild depression)   PHQ-9 Total Score 11 4 9       Previous KEISHA-7:        3/8/2023     3:38 PM 2/14/2024     2:25 PM 7/1/2024    11:09 AM   KEISHA-7 SCORE   Total Score 8 (mild anxiety) 12 (moderate anxiety) 13 (moderate anxiety)   Total Score 8 12    12 13    13       HAILY LEVEL:      12/29/2014     3:00 PM 8/11/2022    10:00 AM   HAILY Score (Last Two)   HAILY Raw Score 39 40   Activation Score 56.4 100   HAILY Level 3 4       DATA  Extended Session (60+ minutes): No  Interactive Complexity: No  Crisis: No  [facility] Patient: No    Treatment Objective(s) Addressed in This Session:  Target Behavior(s):  improve ADHD sx, anxiety and depression    Depressed Mood: Decrease frequency and intensity of feeling down, depressed, hopeless  Improve quantity and quality of night time sleep / decrease daytime naps  Feel less tired and more energy during the day   Improve concentration, focus, and mindfulness in daily activities   Anxiety: will experience a reduction in anxiety and will increase ability to function adaptively  Attention Problems: will develop coping skills to effectively manage attention issues    Current Stressors / Issues:  Questions/Thoughts for Dr. Moore:    Better/worse: worried about finances, is in survival mode, 1 day in on new exercise routine, fo us wears off around 9am since needing to take medication at 530am, pt has taken breaks and it hasn't helped much to refocus    ADLs: hygiene- no concerns, sleep- here and there, not great, hard time falling asleep, mind is thinking and it's hard to relax, over night waking, wake up feeling unrelated  Current Symptoms: Pt says that it's been 2 months since her 's accident. She has been able to see supports through this situation. Pt is using humor to cope. She has been in survival mode. Maria Del Rosario states that she started a new job next week. She is masking and has pushed down her emotions and is now allowing her emotions to come. She is gone 11-12 hrs a day. She is up at 530am and then gets home at 6pm and has evening tasks so it is difficult to  do exercise. Pt started a new exercise routine on Sunday. Pt will walk around a bit at work on lunch break. Pt states that painting is something pt enjoys.  Pt feels like it is harder to stay focused on anything. With the job, she starts at 8am and around 930am she feels like she is bored and not caring and they are thinking about other things they could be doing. Waits to take the 2nd medication around 11am.     Pt will want to start and finish things and be frustrated if they aren't able to finish it.     Suicidality: pt denies     Therapist: seeing therapist weekly, talk about the current situations, haven't talked with therapist about when emotions will hit from no longer being in survival mode  [facility] recommendations: structured mahin       Progress on Treatment Objective(s) / Homework:  Satisfactory progress - ACTION (Actively working towards change); Intervened by reinforcing change plan / affirming steps taken    CBT: Pt has been trying to get out and walk and is doing the best they can with the current stress and situation.   [facility] recommends pt to have skills in place and continue to work with therapist for when their body is finally able to relax and emotions may flood and pt is no longer be in fight or flight.     Motivational Interviewing    MI Intervention: Expressed Empathy/Understanding, Supported Autonomy, Collaboration, Evocation, Permission to raise concern or advise, Open-ended questions, Reflections: simple and complex, Change talk (evoked), and Reframe     Change Talk Expressed by the Patient: Committment to change Activation Taking steps    Provider Response to Change Talk: E - Evoked more info from patient about behavior change, A - Affirmed patient's thoughts, decisions, or attempts at behavior change, R - Reflected patient's change talk, and S - Summarized patient's change talk statements    Also provided psychoeducation about behavioral health condition, symptoms, and treatment  options    Assessments completed prior to visit:  The following assessments were completed by patient for this visit:  PHQ2:       3/8/2023     3:39 PM 5/10/2022    10:45 AM 12/2/2021     1:12 PM 6/17/2021     9:24 AM 3/26/2019    10:54 AM 8/29/2017    11:05 AM 8/9/2016    10:50 AM   PHQ-2 ( 1999 [employer])   Q1: Little interest or pleasure in doing things 1 0 2 0 1 0 0   Q2: Feeling down, depressed or hopeless 1 0 1 0 1 0 0   PHQ-2 Score 2 0 3 0 2 0 0   PHQ-2 Total Score (12-17 Years)- Positive if 3 or more points; Administer PHQ-A if positive    0 2     Q1: Little interest or pleasure in doing things Several days Not at all More than half the days  Several days     Q2: Feeling down, depressed or hopeless Several days Not at all Several days  Several days     PHQ-2 Score 2 0 3  2       GAD2:       3/22/2023     8:11 PM 5/24/2023    11:21 AM 2/14/2024     2:25 PM 7/1/2024    11:09 AM   KEISHA-2   Feeling nervous, anxious, or on edge 1 0 2 2   Not being able to stop or control worrying 1 0 1 2   KEISHA-2 Total Score 2 0 3    3 4    4     PROMIS 10-Global Health (only subscores and total score):       3/8/2023     3:50 PM 2/14/2024     2:27 PM 7/1/2024    11:10 AM   PROMIS-10 Scores Only   Global Mental Health Score 7 7    7 9    9   Global Physical Health Score 11 12    12 13    13   PROMIS TOTAL - SUBSCORES 18 19    19 22    22       Care Plan review completed: Yes    Medication Review:  No changes to current psychiatric medication(s)    Medication Compliance:  Yes    Changes in Health Issues:   None reported    Chemical Use Review:   Substance Use: Chemical use reviewed, no active concerns identified      Tobacco Use: No change in amount of tobacco use since last session.  Pt is aware of resources.      Assessment: Current Emotional / Mental Status (status of significant symptoms):  Risk status (Self / Other harm or suicidal ideation)  Patient denies a history of suicidal ideation, suicide attempts, self-injurious behavior,  homicidal ideation, homicidal behavior, and and other safety concerns  Patient denies current fears or concerns for personal safety.  Patient denies current or recent suicidal ideation or behaviors.  Patient denies current or recent homicidal ideation or behaviors.  Patient denies current or recent self injurious behavior or ideation.  Patient denies other safety concerns.  A safety and risk management plan has not been developed at this time, however patient was encouraged to call [place] [phone] should there be a change in any of these risk factors.    Appearance:   Appropriate    Eye Contact:   Good   Psychomotor Behavior: Normal   Attitude:   Cooperative   Orientation:   All  Speech   Rate / Production: Normal    Volume:  Normal   Mood:    Anxious  Sad   Affect:    Appropriate   Thought Content:  Clear   Thought Form:  Coherent  Logical   Insight:    Good     Diagnoses:  1. KEISHA (generalized anxiety disorder)    2. Attention deficit hyperactivity disorder (ADHD), unspecified ADHD type        Collateral Reports Completed:  Communicated with: Timbo Moore M.D.     Plan: (Homework, other):  Patient was given information about behavioral services and encouraged to schedule a follow up appointment with the clinic [facility] in 1 month.  She was also given information about mental health symptoms and treatment options .  CD Recommendations: No indications of CD issues.     GERARD Alfonso

## 2024-07-02 ENCOUNTER — VIRTUAL VISIT (OUTPATIENT)
Dept: BEHAVIORAL HEALTH | Facility: CLINIC | Age: 29
End: 2024-07-02

## 2024-07-02 ENCOUNTER — VIRTUAL VISIT (OUTPATIENT)
Dept: PSYCHIATRY | Facility: CLINIC | Age: 29
End: 2024-07-02

## 2024-07-02 DIAGNOSIS — F90.9 ATTENTION DEFICIT HYPERACTIVITY DISORDER (ADHD), UNSPECIFIED ADHD TYPE: Primary | ICD-10-CM

## 2024-07-02 DIAGNOSIS — F90.9 ATTENTION DEFICIT HYPERACTIVITY DISORDER (ADHD), UNSPECIFIED ADHD TYPE: ICD-10-CM

## 2024-07-02 DIAGNOSIS — F41.1 GAD (GENERALIZED ANXIETY DISORDER): Primary | ICD-10-CM

## 2024-07-02 PROCEDURE — 90832 PSYTX W PT 30 MINUTES: CPT | Mod: 95 | Performed by: COUNSELOR

## 2024-07-02 PROCEDURE — 99214 OFFICE O/P EST MOD 30 MIN: CPT | Mod: 95 | Performed by: PSYCHIATRY & NEUROLOGY

## 2024-07-02 RX ORDER — METHYLPHENIDATE HYDROCHLORIDE 20 MG/1
TABLET ORAL
Qty: 30 TABLET | Refills: 0 | Status: SHIPPED | OUTPATIENT
Start: 2024-08-31 | End: 2024-07-17 | Stop reason: SINTOL

## 2024-07-02 RX ORDER — METHYLPHENIDATE HYDROCHLORIDE 20 MG/1
TABLET ORAL
Qty: 45 TABLET | Refills: 0 | Status: SHIPPED | OUTPATIENT
Start: 2024-08-01 | End: 2024-07-17 | Stop reason: SINTOL

## 2024-07-02 RX ORDER — METHYLPHENIDATE HYDROCHLORIDE 20 MG/1
20 CAPSULE, EXTENDED RELEASE ORAL DAILY
Qty: 30 CAPSULE | Refills: 0 | Status: SHIPPED | OUTPATIENT
Start: 2024-08-31 | End: 2024-07-17 | Stop reason: SINTOL

## 2024-07-02 RX ORDER — METHYLPHENIDATE HYDROCHLORIDE 20 MG/1
20 CAPSULE, EXTENDED RELEASE ORAL DAILY
Qty: 30 CAPSULE | Refills: 0 | Status: SHIPPED | OUTPATIENT
Start: 2024-08-01 | End: 2024-07-17 | Stop reason: SINTOL

## 2024-07-02 RX ORDER — METHYLPHENIDATE HYDROCHLORIDE 20 MG/1
TABLET ORAL
Qty: 45 TABLET | Refills: 0 | Status: SHIPPED | OUTPATIENT
Start: 2024-07-02 | End: 2024-07-17 | Stop reason: SINTOL

## 2024-07-02 RX ORDER — METHYLPHENIDATE HYDROCHLORIDE 20 MG/1
20 CAPSULE, EXTENDED RELEASE ORAL DAILY
Qty: 30 CAPSULE | Refills: 0 | Status: SHIPPED | OUTPATIENT
Start: 2024-07-02 | End: 2024-07-17 | Stop reason: SINTOL

## 2024-07-02 NOTE — NURSING NOTE
Is the patient currently in the state of [place]? YES    Visit mode:VIDEO    If the visit is dropped, the patient can be reconnected by: VIDEO VISIT: Text to cell phone:   Telephone Information:   Mobile [phone]       Will anyone else be joining the visit? No  (If patient encounters technical issues they should call [phone])    How would you like to obtain your AVS? MyChart    Are changes needed to the allergy or medication list? No    Rooming Documentation: Assigned questionnaire(s) completed .    Reason for visit: RECHISHMAEL Srinivasan
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
## 2024-07-11 ENCOUNTER — MYC MEDICAL ADVICE (OUTPATIENT)
Dept: PSYCHIATRY | Facility: CLINIC | Age: 29
End: 2024-07-11

## 2024-07-11 DIAGNOSIS — F90.9 ATTENTION DEFICIT HYPERACTIVITY DISORDER (ADHD), UNSPECIFIED ADHD TYPE: Primary | ICD-10-CM

## 2024-07-17 RX ORDER — DEXTROAMPHETAMINE SACCHARATE, AMPHETAMINE ASPARTATE MONOHYDRATE, DEXTROAMPHETAMINE SULFATE AND AMPHETAMINE SULFATE 6.25; 6.25; 6.25; 6.25 MG/1; MG/1; MG/1; MG/1
25 CAPSULE, EXTENDED RELEASE ORAL DAILY
Qty: 30 CAPSULE | Refills: 0 | Status: SHIPPED | OUTPATIENT
Start: 2024-07-17 | End: 2024-08-16

## 2024-07-17 RX ORDER — DEXTROAMPHETAMINE SACCHARATE, AMPHETAMINE ASPARTATE MONOHYDRATE, DEXTROAMPHETAMINE SULFATE AND AMPHETAMINE SULFATE 6.25; 6.25; 6.25; 6.25 MG/1; MG/1; MG/1; MG/1
25 CAPSULE, EXTENDED RELEASE ORAL DAILY
Qty: 30 CAPSULE | Refills: 0 | Status: SHIPPED | OUTPATIENT
Start: 2024-08-16 | End: 2024-09-15

## 2024-07-17 RX ORDER — DEXTROAMPHETAMINE SACCHARATE, AMPHETAMINE ASPARTATE MONOHYDRATE, DEXTROAMPHETAMINE SULFATE AND AMPHETAMINE SULFATE 6.25; 6.25; 6.25; 6.25 MG/1; MG/1; MG/1; MG/1
25 CAPSULE, EXTENDED RELEASE ORAL DAILY
Qty: 30 CAPSULE | Refills: 0 | Status: SHIPPED | OUTPATIENT
Start: 2024-09-15 | End: 2024-10-15

## 2024-07-17 RX ORDER — DEXTROAMPHETAMINE SACCHARATE, AMPHETAMINE ASPARTATE, DEXTROAMPHETAMINE SULFATE AND AMPHETAMINE SULFATE 2.5; 2.5; 2.5; 2.5 MG/1; MG/1; MG/1; MG/1
15 TABLET ORAL DAILY PRN
Qty: 45 TABLET | Refills: 0 | Status: SHIPPED | OUTPATIENT
Start: 2024-08-16 | End: 2024-09-15

## 2024-07-17 RX ORDER — DEXTROAMPHETAMINE SACCHARATE, AMPHETAMINE ASPARTATE, DEXTROAMPHETAMINE SULFATE AND AMPHETAMINE SULFATE 2.5; 2.5; 2.5; 2.5 MG/1; MG/1; MG/1; MG/1
15 TABLET ORAL DAILY PRN
Qty: 45 TABLET | Refills: 0 | Status: SHIPPED | OUTPATIENT
Start: 2024-07-17 | End: 2024-08-16

## 2024-07-17 RX ORDER — DEXTROAMPHETAMINE SACCHARATE, AMPHETAMINE ASPARTATE, DEXTROAMPHETAMINE SULFATE AND AMPHETAMINE SULFATE 2.5; 2.5; 2.5; 2.5 MG/1; MG/1; MG/1; MG/1
15 TABLET ORAL DAILY PRN
Qty: 45 TABLET | Refills: 0 | Status: SHIPPED | OUTPATIENT
Start: 2024-09-15 | End: 2024-10-15

## 2024-08-18 DIAGNOSIS — J45.40 MODERATE PERSISTENT ASTHMA WITHOUT COMPLICATION: ICD-10-CM

## 2024-08-20 RX ORDER — DILTIAZEM HYDROCHLORIDE 60 MG/1
2 TABLET, FILM COATED ORAL 2 TIMES DAILY
Qty: 10.2 G | Refills: 5 | Status: SHIPPED | OUTPATIENT
Start: 2024-08-20

## 2024-10-22 ENCOUNTER — MYC REFILL (OUTPATIENT)
Dept: PSYCHIATRY | Facility: CLINIC | Age: 29
End: 2024-10-22
Payer: COMMERCIAL

## 2024-10-22 DIAGNOSIS — F90.9 ATTENTION DEFICIT HYPERACTIVITY DISORDER (ADHD), UNSPECIFIED ADHD TYPE: ICD-10-CM

## 2024-10-22 RX ORDER — DEXTROAMPHETAMINE SACCHARATE, AMPHETAMINE ASPARTATE, DEXTROAMPHETAMINE SULFATE AND AMPHETAMINE SULFATE 2.5; 2.5; 2.5; 2.5 MG/1; MG/1; MG/1; MG/1
15 TABLET ORAL DAILY PRN
Qty: 45 TABLET | Refills: 0 | Status: SHIPPED | OUTPATIENT
Start: 2024-10-22

## 2024-10-22 RX ORDER — DEXTROAMPHETAMINE SACCHARATE, AMPHETAMINE ASPARTATE MONOHYDRATE, DEXTROAMPHETAMINE SULFATE AND AMPHETAMINE SULFATE 6.25; 6.25; 6.25; 6.25 MG/1; MG/1; MG/1; MG/1
25 CAPSULE, EXTENDED RELEASE ORAL DAILY
Qty: 30 CAPSULE | Refills: 0 | Status: SHIPPED | OUTPATIENT
Start: 2024-10-22

## 2024-10-22 NOTE — TELEPHONE ENCOUNTER
Date of Last Office Visit: 7/2/24  Date of Next Office Visit: None; routing for A to assist pt with scheduling.    No shows since last visit: No  More than one patient-initiated cancellation (with reschedule) since last seen in clinic? No    []Medication refilled per  Medication Refill in Ambulatory Care  policy.  [x]Medication unable to be refilled by RN due to criteria not met as indicated below:    []Eligibility: has not had a provider visit within last 6 months   [x]Supervision: no future appointment; < 7 days before next appointment   []Compliance: no shows; cancellations; lapse in therapy   []Verification: order discrepancy; may need modification...   [] > 30-day supply request   []Advanced refill request: > 7 days before refill date   []Controlled medication   []Medication not included in policy   []Review: new med; med adjusted ? 30 days; safety alert; requires lab monitoring...   []Scope of Practice: refill request processed by LPN/MA   []Other:      Medication(s) requested:     -  amphetamine-dextroamphetamine (ADDERALL XR) 25 MG 24 hr capsule   Date last ordered: 9/15/24  Qty: 30  Refills: 0      -  amphetamine-dextroamphetamine (ADDERALL) 10 MG tablet   Date last ordered: 9/15/24  Qty: 45  Refills: 0      Any Controlled Substance(s)? Yes   [place]   Adderall XR 25mg was last sold on 9/23/24 for quantity of 30.  Adderall 10mg tablet was last sold on 9/23/24 for quantity of 45.  Other controlled substance on [place] ?: Yes   If yes, are any new medications? No      Requested medication(s) verified as identical to current order? Yes    Any lapse in adherence to medication(s) greater than 5 days? No      Additional action taken? routed encounter to provider for review.      Last visit treatment plan:       PLAN:      Restarted consult  Does not meet criteria for involuntary treatment or hospitalization  Adderall 10 mg p.o. twice daily efficacy after second tablet x6 hours, low appetite but  manageable => 3/23/2023 XR 25 mg po qam/IR 10-15 mg po qpm prn adhd degree of tolerance, second dose seems to be stacking and working better than initial dose => 2/14/2024 IR 20 mg po qam/15 mg po qpm efficacious however initial dose last about 3 hours, then immediate release late morning dose lasts maybe 5-guero hours => 7/2/2024 Ritalin IR 20 mg qam/LA 20 mg qday/IR 10 mg qpm prn adhd-Risks, benefits and alternatives discussed.  Patient provides verbal consent to treatment.  Previously advised of heart effects, discontinue for any heart related effects.  Advised of addiction, insomnia.  Escitalopram 20 mg daily  On Chantix  Hydroxyzine 25 mg p.o. 3 times daily as needed anxiety    Any medication(s) require lab monitoring? No

## 2024-11-05 ENCOUNTER — OFFICE VISIT (OUTPATIENT)
Dept: FAMILY MEDICINE | Facility: CLINIC | Age: 29
End: 2024-11-05
Payer: COMMERCIAL

## 2024-11-05 VITALS
BODY MASS INDEX: 32.25 KG/M2 | OXYGEN SATURATION: 100 % | HEIGHT: 63 IN | HEART RATE: 90 BPM | DIASTOLIC BLOOD PRESSURE: 88 MMHG | SYSTOLIC BLOOD PRESSURE: 134 MMHG | RESPIRATION RATE: 20 BRPM | TEMPERATURE: 98.8 F | WEIGHT: 182 LBS

## 2024-11-05 DIAGNOSIS — N93.9 ABNORMAL VAGINAL BLEEDING: ICD-10-CM

## 2024-11-05 DIAGNOSIS — R10.2 PELVIC PRESSURE IN FEMALE: Primary | ICD-10-CM

## 2024-11-05 DIAGNOSIS — N92.6 IRREGULAR MENSES: ICD-10-CM

## 2024-11-05 DIAGNOSIS — N89.8 VAGINAL DISCHARGE: ICD-10-CM

## 2024-11-05 DIAGNOSIS — R19.8 ALTERED BOWEL FUNCTION: ICD-10-CM

## 2024-11-05 LAB
ALBUMIN UR-MCNC: NEGATIVE MG/DL
APPEARANCE UR: CLEAR
BILIRUB UR QL STRIP: NEGATIVE
CLUE CELLS: ABNORMAL
COLOR UR AUTO: YELLOW
ERYTHROCYTE [DISTWIDTH] IN BLOOD BY AUTOMATED COUNT: 12 % (ref 10–15)
GLUCOSE UR STRIP-MCNC: NEGATIVE MG/DL
HCT VFR BLD AUTO: 41.7 % (ref 35–47)
HGB BLD-MCNC: 14 G/DL (ref 11.7–15.7)
HGB UR QL STRIP: NEGATIVE
KETONES UR STRIP-MCNC: NEGATIVE MG/DL
LEUKOCYTE ESTERASE UR QL STRIP: NEGATIVE
MCH RBC QN AUTO: 28.3 PG (ref 26.5–33)
MCHC RBC AUTO-ENTMCNC: 33.6 G/DL (ref 31.5–36.5)
MCV RBC AUTO: 84 FL (ref 78–100)
NITRATE UR QL: NEGATIVE
PH UR STRIP: 6 [PH] (ref 5–7)
PLATELET # BLD AUTO: 307 10E3/UL (ref 150–450)
RBC # BLD AUTO: 4.95 10E6/UL (ref 3.8–5.2)
SP GR UR STRIP: <=1.005 (ref 1–1.03)
TRICHOMONAS, WET PREP: ABNORMAL
UROBILINOGEN UR STRIP-ACNC: 0.2 E.U./DL
WBC # BLD AUTO: 10.2 10E3/UL (ref 4–11)
WBC'S/HIGH POWER FIELD, WET PREP: ABNORMAL
YEAST, WET PREP: ABNORMAL

## 2024-11-05 PROCEDURE — 99213 OFFICE O/P EST LOW 20 MIN: CPT | Performed by: PHYSICIAN ASSISTANT

## 2024-11-05 PROCEDURE — 80053 COMPREHEN METABOLIC PANEL: CPT | Performed by: PHYSICIAN ASSISTANT

## 2024-11-05 PROCEDURE — 81003 URINALYSIS AUTO W/O SCOPE: CPT | Performed by: PHYSICIAN ASSISTANT

## 2024-11-05 PROCEDURE — 85027 COMPLETE CBC AUTOMATED: CPT | Performed by: PHYSICIAN ASSISTANT

## 2024-11-05 PROCEDURE — 36415 COLL VENOUS BLD VENIPUNCTURE: CPT | Performed by: PHYSICIAN ASSISTANT

## 2024-11-05 PROCEDURE — 84702 CHORIONIC GONADOTROPIN TEST: CPT | Performed by: PHYSICIAN ASSISTANT

## 2024-11-05 PROCEDURE — 87210 SMEAR WET MOUNT SALINE/INK: CPT | Performed by: PHYSICIAN ASSISTANT

## 2024-11-05 PROCEDURE — 84443 ASSAY THYROID STIM HORMONE: CPT | Performed by: PHYSICIAN ASSISTANT

## 2024-11-05 ASSESSMENT — ASTHMA QUESTIONNAIRES
QUESTION_2 LAST FOUR WEEKS HOW OFTEN HAVE YOU HAD SHORTNESS OF BREATH: ONCE OR TWICE A WEEK
ACT_TOTALSCORE: 18
QUESTION_3 LAST FOUR WEEKS HOW OFTEN DID YOUR ASTHMA SYMPTOMS (WHEEZING, COUGHING, SHORTNESS OF BREATH, CHEST TIGHTNESS OR PAIN) WAKE YOU UP AT NIGHT OR EARLIER THAN USUAL IN THE MORNING: ONCE A WEEK
QUESTION_5 LAST FOUR WEEKS HOW WOULD YOU RATE YOUR ASTHMA CONTROL: WELL CONTROLLED
QUESTION_4 LAST FOUR WEEKS HOW OFTEN HAVE YOU USED YOUR RESCUE INHALER OR NEBULIZER MEDICATION (SUCH AS ALBUTEROL): TWO OR THREE TIMES PER WEEK
ACT_TOTALSCORE: 18
QUESTION_1 LAST FOUR WEEKS HOW MUCH OF THE TIME DID YOUR ASTHMA KEEP YOU FROM GETTING AS MUCH DONE AT WORK, SCHOOL OR AT HOME: A LITTLE OF THE TIME

## 2024-11-05 ASSESSMENT — PATIENT HEALTH QUESTIONNAIRE - PHQ9
SUM OF ALL RESPONSES TO PHQ QUESTIONS 1-9: 10
10. IF YOU CHECKED OFF ANY PROBLEMS, HOW DIFFICULT HAVE THESE PROBLEMS MADE IT FOR YOU TO DO YOUR WORK, TAKE CARE OF THINGS AT HOME, OR GET ALONG WITH OTHER PEOPLE: SOMEWHAT DIFFICULT
SUM OF ALL RESPONSES TO PHQ QUESTIONS 1-9: 10

## 2024-11-05 ASSESSMENT — PAIN SCALES - GENERAL: PAINLEVEL_OUTOF10: NO PAIN (0)

## 2024-11-05 NOTE — PROGRESS NOTES
"  Assessment & Plan     Pelvic pressure in female  - HCG quantitative pregnancy  - UA Macroscopic with reflex to Microscopic and Culture - Lab Collect    Abnormal vaginal bleeding  - US Pelvic Complete with Transvaginal; Future  - HCG quantitative pregnancy  - CBC with platelets  - TSH with free T4 reflex    Irregular menses      Vaginal discharge  - Wet prep - lab collect    Altered bowel function  - Comprehensive metabolic panel (BMP + Alb, Alk Phos, ALT, AST, Total. Bili, TP)  - TSH with free T4 reflex    All testing essentially negative. Not pregnant. No UTI. No BV/trich/yeast. Renal and liver function normal. See results documentation for further recommendations.     BMI  Estimated body mass index is 31.99 kg/m  as calculated from the following:    Height as of this encounter: 1.607 m (5' 3.25\").    Weight as of this encounter: 82.6 kg (182 lb).       Naty Mix is a 29 year old, presenting for the following health issues:  Abnormal Bleeding Problem (3 months, longer between periods, shorter periods, discharge, pelvic pressure/pain, 3 weeks of jabbing in the pubic hair area)      11/5/2024     1:16 PM   Additional Questions   Roomed by shannan   Accompanied by 2 younger sons         11/5/2024     1:16 PM   Patient Reported Additional Medications   Patient reports taking the following new medications see chart     Maria Del Rosario is a very pleasant 29 year old female who presents to clinic (accompanied by her two young boys)  for evaluation of lower abdominal cramping and sudden changes in her menstrual cycle. The past 3 months have been very different for her in that there is a longer time between menses and when it does appear, the cycle is shorter, but not lighter. She has had increased clear vaginal discharge throughout the entire month with it being a yellowish color two to three times. She, then, has developed lower abdominal pressure that feels like her uterus in ruthann but not releasing. This " happens right before she bleeds. She has not had that since September. She has felt twitches and jabs in the lower uterus area and has also felt like something is doing flips. Sometimes she can feel something moving externally. She has done multiple pregnancy tests and had some definitely negative and others with a very faint line so assumed it was negative. She does have PCOS but has never experienced this before. She has some constipation as well, which is different too. No hair loss or skin changes. No urinary symptoms.            History of Present Illness       Reason for visit:  Movement/jabs/rolls/twitches in uterus, lack of period for 2 months, increased discharge everyday of cycle, occassional pelvic pressure/pain  Symptom onset:  More than a month  Symptoms include:  Listed above  Symptom intensity:  Moderate  Symptom progression:  Staying the same  Had these symptoms before:  No   She is taking medications regularly.             Review of Systems  Constitutional, HEENT, cardiovascular, pulmonary, gi and gu systems are negative, except as otherwise noted.        Objective    There were no vitals taken for this visit.  There is no height or weight on file to calculate BMI.  Physical Exam  Vitals reviewed.   Constitutional:       Appearance: She is not ill-appearing.   Cardiovascular:      Rate and Rhythm: Normal rate and regular rhythm.      Heart sounds: Normal heart sounds. No murmur heard.  Pulmonary:      Effort: Pulmonary effort is normal.      Breath sounds: Normal breath sounds.   Abdominal:      General: Bowel sounds are normal. There is no distension.      Palpations: Abdomen is soft.      Tenderness: There is no abdominal tenderness. There is no guarding or rebound.   Neurological:      General: No focal deficit present.      Mental Status: She is alert and oriented to person, place, and time.   Psychiatric:         Mood and Affect: Mood normal.         Thought Content: Thought content normal.          Judgment: Judgment normal.          Signed Electronically by: SG CORDOBA PA-C

## 2024-11-06 ENCOUNTER — ANCILLARY PROCEDURE (OUTPATIENT)
Dept: ULTRASOUND IMAGING | Facility: OTHER | Age: 29
End: 2024-11-06
Attending: PHYSICIAN ASSISTANT
Payer: COMMERCIAL

## 2024-11-06 DIAGNOSIS — N93.9 ABNORMAL VAGINAL BLEEDING: ICD-10-CM

## 2024-11-06 DIAGNOSIS — R10.2 PELVIC PRESSURE IN FEMALE: ICD-10-CM

## 2024-11-06 LAB
ALBUMIN SERPL BCG-MCNC: 4.6 G/DL (ref 3.5–5.2)
ALP SERPL-CCNC: 80 U/L (ref 40–150)
ALT SERPL W P-5'-P-CCNC: 19 U/L (ref 0–50)
ANION GAP SERPL CALCULATED.3IONS-SCNC: 12 MMOL/L (ref 7–15)
AST SERPL W P-5'-P-CCNC: 17 U/L (ref 0–45)
BILIRUB SERPL-MCNC: <0.2 MG/DL
BUN SERPL-MCNC: 12.7 MG/DL (ref 6–20)
CALCIUM SERPL-MCNC: 9.7 MG/DL (ref 8.8–10.4)
CHLORIDE SERPL-SCNC: 102 MMOL/L (ref 98–107)
CREAT SERPL-MCNC: 0.68 MG/DL (ref 0.51–0.95)
EGFRCR SERPLBLD CKD-EPI 2021: >90 ML/MIN/1.73M2
GLUCOSE SERPL-MCNC: 85 MG/DL (ref 70–99)
HCG INTACT+B SERPL-ACNC: <1 MIU/ML
HCO3 SERPL-SCNC: 25 MMOL/L (ref 22–29)
POTASSIUM SERPL-SCNC: 4.2 MMOL/L (ref 3.4–5.3)
PROT SERPL-MCNC: 7.8 G/DL (ref 6.4–8.3)
SODIUM SERPL-SCNC: 139 MMOL/L (ref 135–145)
TSH SERPL DL<=0.005 MIU/L-ACNC: 0.97 UIU/ML (ref 0.3–4.2)

## 2024-11-06 PROCEDURE — 76830 TRANSVAGINAL US NON-OB: CPT | Mod: TC | Performed by: RADIOLOGY

## 2024-11-06 PROCEDURE — 76856 US EXAM PELVIC COMPLETE: CPT | Mod: TC | Performed by: RADIOLOGY

## 2024-11-07 ENCOUNTER — MYC MEDICAL ADVICE (OUTPATIENT)
Dept: FAMILY MEDICINE | Facility: CLINIC | Age: 29
End: 2024-11-07

## 2024-11-07 DIAGNOSIS — R10.2 PELVIC PRESSURE IN FEMALE: ICD-10-CM

## 2024-11-07 DIAGNOSIS — Z13.220 LIPID SCREENING: Primary | ICD-10-CM

## 2024-11-07 DIAGNOSIS — N93.9 ABNORMAL VAGINAL BLEEDING: ICD-10-CM

## 2024-11-07 DIAGNOSIS — N92.6 IRREGULAR MENSES: ICD-10-CM

## 2025-01-09 ENCOUNTER — MYC REFILL (OUTPATIENT)
Dept: PSYCHIATRY | Facility: CLINIC | Age: 30
End: 2025-01-09
Payer: COMMERCIAL

## 2025-01-09 DIAGNOSIS — F90.9 ATTENTION DEFICIT HYPERACTIVITY DISORDER (ADHD), UNSPECIFIED ADHD TYPE: ICD-10-CM

## 2025-01-09 NOTE — TELEPHONE ENCOUNTER
Reviewed 7/11/24 [employer] message. Dr. Moore changed patient back to Adderall per patient request.     Date of Last Office Visit: 7/2/24  Date of Next Office Visit:  1/28/25 with Luci Hernandez  No shows since last visit: No  More than one patient-initiated cancellation (with reschedule) since last seen in clinic? No    []Medication refilled per  Medication Refill in Ambulatory Care  policy.  [x]Medication unable to be refilled by RN due to criteria not met as indicated below:    []Eligibility: has not had a provider visit within last 6 months   []Supervision: no future appointment; < 7 days before next appointment   [x]Compliance: no shows; cancellations; lapse in therapy   []Verification: order discrepancy; may need modification...   [] > 30-day supply request   []Advanced refill request: > 7 days before refill date   [x]Controlled medication   []Medication not included in policy   []Review: new med; med adjusted <= 30 days; safety alert; requires lab monitoring...   []Scope of Practice: refill request processed by LPN/MA   []Other:      Medication(s) requested:     -  amphetamine-dextroamphetamine (ADDERALL) 10 MG tablet   Date last ordered: 45  Qty: 0  Refills: 0        -  amphetamine-dextroamphetamine (ADDERALL XR) 25 MG 24 hr capsule   Date last ordered: 11/29/24  Qty: 30  Refills: 0      Any Controlled Substance(s)? Yes   [place]      Requested medication(s) verified as identical to current order? Yes    Any lapse in adherence to medication(s) greater than 5 days? Yes     Additional action taken? routed encounter to provider for review.      Last visit treatment plan:       PLAN:      Restarted consult  Does not meet criteria for involuntary treatment or hospitalization  Adderall 10 mg p.o. twice daily efficacy after second tablet x6 hours, low appetite but manageable => 3/23/2023 XR 25 mg po qam/IR 10-15 mg po qpm prn adhd degree of tolerance, second dose seems to be stacking and working better than  initial dose => 2/14/2024 IR 20 mg po qam/15 mg po qpm efficacious however initial dose last about 3 hours, then immediate release late morning dose lasts maybe 5-guero hours => 7/2/2024 Ritalin IR 20 mg qam/LA 20 mg qday/IR 10 mg qpm prn adhd-Risks, benefits and alternatives discussed.  Patient provides verbal consent to treatment.  Previously advised of heart effects, discontinue for any heart related effects.  Advised of addiction, insomnia.  Escitalopram 20 mg daily  On Chantix  Hydroxyzine 25 mg p.o. 3 times daily as needed anxiety  Behavioral sleep interventions - efficacious   Bedtime - 10:00PM; the earliest time to go to bed if actually sleepy  Waketime - 5ish AM; no sleep after   No sleep outside of schedule  Stimulus Control  -our brain can get mixed up with what bed is for. the brain can associate the bed with alertness, thinking, anxiety, etc.  Sometimes we need to re train the brain that bed is for sleeping. Here's how to do it:  -if waking up in the middle of the night and think you cannot fall asleep, recommend leaving the bed, relaxing, and returning to bed when you feel like you can fall asleep quickly  -We set a recommended bedtime; however, if you are in the middle of the night wide-awake, angry, anxious or thinking a lot we recommend waiting until you feel like you could fall asleep quickly then going to bed.          Any medication(s) require lab monitoring? No

## 2025-01-15 RX ORDER — DEXTROAMPHETAMINE SACCHARATE, AMPHETAMINE ASPARTATE MONOHYDRATE, DEXTROAMPHETAMINE SULFATE AND AMPHETAMINE SULFATE 6.25; 6.25; 6.25; 6.25 MG/1; MG/1; MG/1; MG/1
25 CAPSULE, EXTENDED RELEASE ORAL DAILY
Qty: 30 CAPSULE | Refills: 0 | Status: SHIPPED | OUTPATIENT
Start: 2025-01-15

## 2025-01-15 RX ORDER — DEXTROAMPHETAMINE SACCHARATE, AMPHETAMINE ASPARTATE, DEXTROAMPHETAMINE SULFATE AND AMPHETAMINE SULFATE 2.5; 2.5; 2.5; 2.5 MG/1; MG/1; MG/1; MG/1
15 TABLET ORAL DAILY PRN
Qty: 45 TABLET | Refills: 0 | Status: SHIPPED | OUTPATIENT
Start: 2025-01-15
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
## 2025-01-28 ENCOUNTER — VIRTUAL VISIT (OUTPATIENT)
Dept: PSYCHIATRY | Facility: CLINIC | Age: 30
End: 2025-01-28
Payer: COMMERCIAL

## 2025-01-28 VITALS — BODY MASS INDEX: 29.23 KG/M2 | HEIGHT: 63 IN | WEIGHT: 165 LBS

## 2025-01-28 DIAGNOSIS — F90.9 ATTENTION DEFICIT HYPERACTIVITY DISORDER (ADHD), UNSPECIFIED ADHD TYPE: Primary | ICD-10-CM

## 2025-01-28 DIAGNOSIS — G47.00 INSOMNIA, UNSPECIFIED TYPE: ICD-10-CM

## 2025-01-28 DIAGNOSIS — F41.1 GAD (GENERALIZED ANXIETY DISORDER): ICD-10-CM

## 2025-01-28 DIAGNOSIS — F43.10 PTSD (POST-TRAUMATIC STRESS DISORDER): ICD-10-CM

## 2025-01-28 DIAGNOSIS — F33.2 SEVERE EPISODE OF RECURRENT MAJOR DEPRESSIVE DISORDER, WITHOUT PSYCHOTIC FEATURES (H): ICD-10-CM

## 2025-01-28 RX ORDER — DEXTROAMPHETAMINE SACCHARATE, AMPHETAMINE ASPARTATE MONOHYDRATE, DEXTROAMPHETAMINE SULFATE AND AMPHETAMINE SULFATE 6.25; 6.25; 6.25; 6.25 MG/1; MG/1; MG/1; MG/1
25 CAPSULE, EXTENDED RELEASE ORAL EVERY MORNING
Qty: 30 CAPSULE | Refills: 0 | Status: SHIPPED | OUTPATIENT
Start: 2025-02-27 | End: 2025-03-29

## 2025-01-28 RX ORDER — DEXTROAMPHETAMINE SACCHARATE, AMPHETAMINE ASPARTATE, DEXTROAMPHETAMINE SULFATE AND AMPHETAMINE SULFATE 2.5; 2.5; 2.5; 2.5 MG/1; MG/1; MG/1; MG/1
15 TABLET ORAL DAILY PRN
Qty: 45 TABLET | Refills: 0 | Status: SHIPPED | OUTPATIENT
Start: 2025-02-27 | End: 2025-03-29

## 2025-01-28 RX ORDER — DEXTROAMPHETAMINE SACCHARATE, AMPHETAMINE ASPARTATE, DEXTROAMPHETAMINE SULFATE AND AMPHETAMINE SULFATE 2.5; 2.5; 2.5; 2.5 MG/1; MG/1; MG/1; MG/1
15 TABLET ORAL DAILY PRN
Qty: 45 TABLET | Refills: 0 | Status: SHIPPED | OUTPATIENT
Start: 2025-01-28 | End: 2025-02-27

## 2025-01-28 RX ORDER — DEXTROAMPHETAMINE SACCHARATE, AMPHETAMINE ASPARTATE MONOHYDRATE, DEXTROAMPHETAMINE SULFATE AND AMPHETAMINE SULFATE 6.25; 6.25; 6.25; 6.25 MG/1; MG/1; MG/1; MG/1
25 CAPSULE, EXTENDED RELEASE ORAL EVERY MORNING
Qty: 30 CAPSULE | Refills: 0 | Status: SHIPPED | OUTPATIENT
Start: 2025-01-28 | End: 2025-02-27

## 2025-01-28 RX ORDER — DEXTROAMPHETAMINE SACCHARATE, AMPHETAMINE ASPARTATE, DEXTROAMPHETAMINE SULFATE AND AMPHETAMINE SULFATE 2.5; 2.5; 2.5; 2.5 MG/1; MG/1; MG/1; MG/1
15 TABLET ORAL DAILY PRN
Qty: 45 TABLET | Refills: 0 | Status: SHIPPED | OUTPATIENT
Start: 2025-03-29 | End: 2025-04-28

## 2025-01-28 RX ORDER — ESCITALOPRAM OXALATE 20 MG/1
10 TABLET ORAL DAILY
Qty: 45 TABLET | Refills: 0 | Status: SHIPPED | OUTPATIENT
Start: 2025-01-28

## 2025-01-28 RX ORDER — DEXTROAMPHETAMINE SACCHARATE, AMPHETAMINE ASPARTATE MONOHYDRATE, DEXTROAMPHETAMINE SULFATE AND AMPHETAMINE SULFATE 6.25; 6.25; 6.25; 6.25 MG/1; MG/1; MG/1; MG/1
25 CAPSULE, EXTENDED RELEASE ORAL EVERY MORNING
Qty: 30 CAPSULE | Refills: 0 | Status: SHIPPED | OUTPATIENT
Start: 2025-03-29 | End: 2025-04-28

## 2025-01-28 ASSESSMENT — PATIENT HEALTH QUESTIONNAIRE - PHQ9
SUM OF ALL RESPONSES TO PHQ QUESTIONS 1-9: 8
10. IF YOU CHECKED OFF ANY PROBLEMS, HOW DIFFICULT HAVE THESE PROBLEMS MADE IT FOR YOU TO DO YOUR WORK, TAKE CARE OF THINGS AT HOME, OR GET ALONG WITH OTHER PEOPLE: SOMEWHAT DIFFICULT
SUM OF ALL RESPONSES TO PHQ QUESTIONS 1-9: 8

## 2025-01-28 ASSESSMENT — ANXIETY QUESTIONNAIRES
2. NOT BEING ABLE TO STOP OR CONTROL WORRYING: SEVERAL DAYS
4. TROUBLE RELAXING: NEARLY EVERY DAY
GAD7 TOTAL SCORE: 11
7. FEELING AFRAID AS IF SOMETHING AWFUL MIGHT HAPPEN: SEVERAL DAYS
1. FEELING NERVOUS, ANXIOUS, OR ON EDGE: MORE THAN HALF THE DAYS
5. BEING SO RESTLESS THAT IT IS HARD TO SIT STILL: SEVERAL DAYS
7. FEELING AFRAID AS IF SOMETHING AWFUL MIGHT HAPPEN: SEVERAL DAYS
8. IF YOU CHECKED OFF ANY PROBLEMS, HOW DIFFICULT HAVE THESE MADE IT FOR YOU TO DO YOUR WORK, TAKE CARE OF THINGS AT HOME, OR GET ALONG WITH OTHER PEOPLE?: SOMEWHAT DIFFICULT
GAD7 TOTAL SCORE: 11
GAD7 TOTAL SCORE: 11
6. BECOMING EASILY ANNOYED OR IRRITABLE: MORE THAN HALF THE DAYS
IF YOU CHECKED OFF ANY PROBLEMS ON THIS QUESTIONNAIRE, HOW DIFFICULT HAVE THESE PROBLEMS MADE IT FOR YOU TO DO YOUR WORK, TAKE CARE OF THINGS AT HOME, OR GET ALONG WITH OTHER PEOPLE: SOMEWHAT DIFFICULT
3. WORRYING TOO MUCH ABOUT DIFFERENT THINGS: SEVERAL DAYS

## 2025-01-28 ASSESSMENT — PAIN SCALES - GENERAL: PAINLEVEL_OUTOF10: MODERATE PAIN (4)

## 2025-01-28 NOTE — PATIENT INSTRUCTIONS
Medication Plan  -Continue Adderall XR 25mg every morning and IR 15mg in the afternoon as needed for additional coverage  -Continue Lexapro 10mg daily   -Follow-up with PCP regarding dizziness.    **For crisis resources, please see the information at the end of this document**   Patient Education    Thank you for coming to the [facility] [place] [facility].     Lab Testing:  If you had lab testing today and your results are reassuring or normal they will be mailed to you or sent through [facility] within 7 days. If the lab tests need quick action we will call you with the results. The phone number we will call with results is # [phone]. If this is not the best number please call our clinic and change the number.     Medication Refills:  If you need any refills please call your pharmacy and they will contact us.   Three business days of notice are needed for general medication refill requests.   Five business days of notice are needed for controlled substance refill requests.   If you need to change to a different pharmacy, please contact the new pharmacy directly. The new pharmacy will help you get your medications transferred.     Contact Us:  Please call [phone] during business hours (8-5:00 M-F).     Financial Assistance [phone]   Medical Records [phone]       MENTAL HEALTH CRISIS RESOURCES:  For a emergency help, please call 911 or go to the nearest Emergency Department.     Emergency Walk-In Options:   EmPATH Unit @ [facility] ([place]): [phone] - Specialized mental health emergency area designed to be calming  [facility] ([place]): [phone]  [facility] Acute Psychiat[facility] ([place]): [phone]  [facility]): [phone]    [facility] Crisis Information:   [place]: [phone]  [place]: [phone]  [place] ([place]) - Adult: [phone]     Child: [phone]  [place] - Adult: [phone]     Child:  [phone]  [place]: [phone]  List of all [facility] resources:   https://[place].gov/dhs/people-we-serve/adults/health-care/mental-health/resources/crisis-contacts.jsp    National Crisis Information:   Crisis Text Line: Text  [place]  to 504836  Suicide & Crisis Lifeline: 988  National Suicide Prevention Lifeline: 2-943-879-TALK (1-875.887.3624)       For online chat options, visit https://suicidepreventionlifeline.org/chat/  Poison Control Center: 9-230-547-4662  Trans Lifeline: 1-326-010-6283 - Hotline for transgender people of all ages  The [employer]: 5-024-789-3158 - Hotline for LGBT youth     For Non-Emergency Support:   Fast Tracker: Mental Health & Substance Use Disorder Resources -   https://www.[place] [employer].org/

## 2025-01-28 NOTE — Clinical Note
Paul Maria-  I met Maria Del Rosario today and I will be seeing her with [facility]. She mentioned longstanding history of orthostasis - although feels symptoms have steadily been worsening. I asked her to schedule a visit with you for repeat vitals and further work-up. It sounds like symptoms are independent of stimulant use.   Just a heads up.  Thanks, Luci

## 2025-01-28 NOTE — NURSING NOTE
Current patient location:  [address] [place] [address] [place], [place]    Is the patient currently in the state of [place]? YES    Visit mode: VIDEO    If the visit is dropped, the patient can be reconnected by:VIDEO VISIT: Text to cell phone:   Telephone Information:   Mobile [phone]       Will anyone else be joining the visit? NO  (If patient encounters technical issues they should call [phone]    Are changes needed to the allergy or medication list? No    Are refills needed on medications prescribed by this physician? NO    Rooming Documentation:  Questionnaire(s) completed    Reason for visit: Consult    Lurdes QUIÑONES

## 2025-01-28 NOTE — PROGRESS NOTES
Virtual Visit Details    Type of service:  Video Visit     Originating Location (pt. Location): Parked car    Distant Location (provider location):  Off-site  Platform used for Video Visit: Jacqueline

## 2025-03-02 ENCOUNTER — HEALTH MAINTENANCE LETTER (OUTPATIENT)
Age: 30
End: 2025-03-02

## 2025-03-04 DIAGNOSIS — J45.40 MODERATE PERSISTENT ASTHMA WITHOUT COMPLICATION: ICD-10-CM

## 2025-03-05 NOTE — TELEPHONE ENCOUNTER
Clinic RN: Please investigate patient's chart or contact patient if the information cannot be found because patient should have run out of this medication on April 2024. Confirm patient is taking this medication as prescribed. Document findings and route refill encounter to provider for approval or denial.

## 2025-03-05 NOTE — TELEPHONE ENCOUNTER
Left message on answering machine for patient to call back to [phone].  Haley Fairbanks BSN, RN

## 2025-03-06 RX ORDER — ALBUTEROL SULFATE 90 UG/1
INHALANT RESPIRATORY (INHALATION)
Qty: 18 G | Refills: 5 | Status: SHIPPED | OUTPATIENT
Start: 2025-03-06

## 2025-03-06 NOTE — TELEPHONE ENCOUNTER
Routing to provider. Prescription is PRN for Moderate persistent asthma. Please review and advise. Quang Hart RN, BSN

## 2025-07-15 ENCOUNTER — VIRTUAL VISIT (OUTPATIENT)
Dept: PSYCHIATRY | Facility: CLINIC | Age: 30
End: 2025-07-15
Payer: COMMERCIAL

## 2025-07-15 DIAGNOSIS — F43.10 PTSD (POST-TRAUMATIC STRESS DISORDER): ICD-10-CM

## 2025-07-15 DIAGNOSIS — F33.42 MAJOR DEPRESSIVE DISORDER, RECURRENT EPISODE, IN FULL REMISSION: Primary | ICD-10-CM

## 2025-07-15 DIAGNOSIS — F33.2 SEVERE EPISODE OF RECURRENT MAJOR DEPRESSIVE DISORDER, WITHOUT PSYCHOTIC FEATURES (H): ICD-10-CM

## 2025-07-15 DIAGNOSIS — F90.9 ATTENTION DEFICIT HYPERACTIVITY DISORDER (ADHD), UNSPECIFIED ADHD TYPE: ICD-10-CM

## 2025-07-15 DIAGNOSIS — F41.1 GAD (GENERALIZED ANXIETY DISORDER): ICD-10-CM

## 2025-07-15 PROCEDURE — 98006 SYNCH AUDIO-VIDEO EST MOD 30: CPT

## 2025-07-15 RX ORDER — DEXTROAMPHETAMINE SACCHARATE, AMPHETAMINE ASPARTATE MONOHYDRATE, DEXTROAMPHETAMINE SULFATE AND AMPHETAMINE SULFATE 6.25; 6.25; 6.25; 6.25 MG/1; MG/1; MG/1; MG/1
25 CAPSULE, EXTENDED RELEASE ORAL DAILY
Qty: 30 CAPSULE | Refills: 0 | Status: SHIPPED | OUTPATIENT
Start: 2025-07-15 | End: 2025-08-14

## 2025-07-15 RX ORDER — DEXTROAMPHETAMINE SACCHARATE, AMPHETAMINE ASPARTATE, DEXTROAMPHETAMINE SULFATE AND AMPHETAMINE SULFATE 2.5; 2.5; 2.5; 2.5 MG/1; MG/1; MG/1; MG/1
10 TABLET ORAL DAILY PRN
Qty: 45 TABLET | Refills: 0 | Status: SHIPPED | OUTPATIENT
Start: 2025-09-13

## 2025-07-15 RX ORDER — DEXTROAMPHETAMINE SACCHARATE, AMPHETAMINE ASPARTATE, DEXTROAMPHETAMINE SULFATE AND AMPHETAMINE SULFATE 2.5; 2.5; 2.5; 2.5 MG/1; MG/1; MG/1; MG/1
15 TABLET ORAL DAILY PRN
Qty: 45 TABLET | Refills: 0 | Status: SHIPPED | OUTPATIENT
Start: 2025-07-15

## 2025-07-15 RX ORDER — DEXTROAMPHETAMINE SACCHARATE, AMPHETAMINE ASPARTATE MONOHYDRATE, DEXTROAMPHETAMINE SULFATE AND AMPHETAMINE SULFATE 6.25; 6.25; 6.25; 6.25 MG/1; MG/1; MG/1; MG/1
25 CAPSULE, EXTENDED RELEASE ORAL DAILY
Qty: 30 CAPSULE | Refills: 0 | Status: SHIPPED | OUTPATIENT
Start: 2025-09-13 | End: 2025-10-13

## 2025-07-15 RX ORDER — ESCITALOPRAM OXALATE 20 MG/1
10 TABLET ORAL DAILY
Qty: 45 TABLET | Refills: 0 | Status: SHIPPED | OUTPATIENT
Start: 2025-07-15

## 2025-07-15 RX ORDER — DEXTROAMPHETAMINE SACCHARATE, AMPHETAMINE ASPARTATE MONOHYDRATE, DEXTROAMPHETAMINE SULFATE AND AMPHETAMINE SULFATE 6.25; 6.25; 6.25; 6.25 MG/1; MG/1; MG/1; MG/1
25 CAPSULE, EXTENDED RELEASE ORAL DAILY
Qty: 30 CAPSULE | Refills: 0 | Status: SHIPPED | OUTPATIENT
Start: 2025-08-14 | End: 2025-09-13

## 2025-07-15 RX ORDER — DEXTROAMPHETAMINE SACCHARATE, AMPHETAMINE ASPARTATE, DEXTROAMPHETAMINE SULFATE AND AMPHETAMINE SULFATE 2.5; 2.5; 2.5; 2.5 MG/1; MG/1; MG/1; MG/1
15 TABLET ORAL DAILY PRN
Qty: 45 TABLET | Refills: 0 | Status: SHIPPED | OUTPATIENT
Start: 2025-08-14

## 2025-07-15 NOTE — NURSING NOTE
Current patient location: Patient declined to provide     Is the patient currently in the state of [place]? YES    Visit mode: VIDEO    If the visit is dropped, the patient can be reconnected by:VIDEO VISIT: Text to cell phone:   Telephone Information:   Mobile [phone]       Will anyone else be joining the visit? NO  (If patient encounters technical issues they should call [phone]    Are changes needed to the allergy or medication list? No    Are refills needed on medications prescribed by this physician? Discuss with provider    Rooming Documentation:  Questionnaire(s) completed    Reason for visit: JOY QUIÑONES

## 2025-07-15 NOTE — PATIENT INSTRUCTIONS
Moving from: Collaborative Care Psychiatry Service ([facility]PS)    Moving to: Referring Provider        We are returning your care back to your Referring Provider.      We will update your Referring Provider/Clinic that you've completed your care with [facility]. This way, they can help you build on the progress you've made in your mental health.        Here's what happens next:    Within the next 3 months: Please set up a visit with your referring provider. Ask for a mental health check-in.  Stay on your current medications--do not change your doses. Your symptoms could get worse if you quickly stop or decrease your medicine.  We've refilled your mental health medications for the next 3 months (90 days). Future refills or dose changes should come from your Referring Provider Clinic.    If you're in therapy, keep it up! If you're not but would like to start, ask your Referring Provider clinic for a referral to therapy, or call [facility] ([phone]) to set up a visit with a therapist.        It's been a pleasure to work with you! If you and your clinic decide that you should return to [facility] in the future, we remain ready to serve you. Ask your clinic for a new referral if needed.

## 2025-07-15 NOTE — PROGRESS NOTES
Virtual Visit Details    Type of service:  Video Visit     Originating Location (pt. Location): Parked vehicle    Distant Location (provider location):  Off-site  Platform used for Video Visit: Jacqueline

## 2025-07-15 NOTE — PROGRESS NOTES
[facility]  Collaborative Care Psychiatry Service ([facility])  MEDICAL PROGRESS NOTE     Maria Del Rosario Ward is a 30 year old adult who prefers the name Maria Del Rosario and pronouns she/her.     She was previously followed by Dr. Timbo Moore. Her most recent visit with Dr. Moore was 07/02/24. Original DA completed 03/09/23 by Dr. Moore.     Initial consultation on 01/28/25.      CARE TEAM:   PCP- Candace Diaz   Therapist- None              Assessment & Plan     History and interview support the following diagnoses:   ADHD, unspecified type  Major Depressive Disorder, moderate, recurrent, in full remission   Unspecified anxiety disorder  PTSD, by history  Insomnia, unspecified type    Maria Del Rosario is a 30-year old adult with past psychiatric history of anxiety, depression, insomnia, trauma, and ADHD who presents for psychiatric follow-up with [facility]. She was previously followed by Dr. Moore.    Maria Del Rosario was last seen 01/28/25 at which time no medication changes were made. Today, Maria Del Rosario reports ongoing stability over the past 6 months with regards to her mental health. She has been on her current medication regimen for about a year and is tolerating her medications without side effects. She reports good benefit from current medications and prefers to continue her medications at the current doses. Given ongoing stability, we discussed return to PCP which Maria Del Rosario is agreeable with. I will provide a 3-month supply of medication and asked that Maria Del Rosario schedule a follow-up visit with PCP within the next 3 months.     PSYCHOTROPIC DRUG INTERACTIONS: **Italicized interactions are for treatment plan options not currently implemented**  ADDITIVE SEROTONERGIC: Lexapro, Adderall    MANAGEMENT:  use lowest therapeutic doses of psychotropic medications and routine monitoring    [place] was [place] today: indicates that controlled prescriptions have been filled as  prescribed              Plan    1) PSYCHOTROPIC MEDICATION RECOMMENDATIONS:  -Continue Adderall XR 25mg every morning and IR 15mg in the afternoon as needed for additional coverage  -Continue Lexapro 10mg daily     2) THERAPY: None    3) NEXT DUE:   Labs- Routine monitoring is not indicated for current psychotropic medication regimen   ECG- Most recent EKG 07/2023 through [facility] - NSR with QTc: 430    4) REFERRALS / COORDINATION: None    5) DISPOSITION: Return to referring provider. [facility].     Treatment Risk Statement:  The patient understands the risks, benefits, adverse effects and alternatives. Agrees to treatment with the capacity to do so. No medical contraindications to treatment. Agrees to contact provider for any problems. The patient understands to call 911 or go to the nearest ED if urgent or life threatening symptoms occur. Crisis contact numbers are provided routinely in the After Visit Summary.    Suicide Risk Assessment: Maria Del Rosario did not appear to be an imminent safety risk to self or others.    PROVIDER:  MAGED Quarles CNP    The [facility] psychiatry providers act as a specialty service for Primary Care Providers in the [facility] who seek to optimize medications for unstable patients. Once medications have been optimized, [facility] providers discharge the patient back to the referring Primary Care Provider for ongoing medication management. Care team has reviewed attendance agreement with patient. Patient advised that two failed appointments within 6 months may lead to termination of current episode of care.     Patient Status:  The patient is being returned to the referring provider for ongoing care and medication prescribing.  The patient can be referred back to this service for further consultation as needed.              Interim History     Maria Del Rosario was last seen 01/28/25 at which time no medication changes were made. Since the last visit:    Things  "have been good,  \"no complaints.\" No major life changes or stressors. Kids are doing well. Her youngest will be starting  this fall.     Taking Adderall most weekdays and holds on the weekends. No side effects with medications. Has been stable on current regimen for about a year. Pleased with current regimen.     Safety-  -Denies SI, NSSI, HI  -Feels safe at home.    Recent Psych Symptoms:   Depression:  Denies symptoms of depression.   Elevated:  none  Psychosis:  none  Anxiety: Anxiety well managed; KEISHA-2 score of 2  Trauma Related:  Hx of PTSD with flashbacks/nightmares. Symptoms are not currently present.   Insomnia:  No    Pertinent Negatives: No suicidal or violent ideation, psychosis, or hypo/saray.      Pertinent Social Hx:  FINANCIAL SUPPORT- Works at a [employer] in [place] [address]   LIVING SITUATION / RELATIONSHIPS- She lives with her  - she has two boys. Feels safe at home.   SOCIAL/ SPIRITUAL SUPPORT- Parents, grandparents, .     Pertinent Substance Use  Alcohol- Denies  Nicotine- Vapes nicotine. No cigarettes in over a year.   Opioids- None  Narcan Kit- N/A  THC/CBD- Denies  Other Illicit Drugs- none              Medical Review of Systems   Dizziness/orthostasis- Reports chronic dizziness for many years - especially when bending over and standing too quickly. No falls. Vertigo runs in her family.   Dermatologic: Night sweats for ~1 year - not correlated with Lexapro initiation and does not feel this has been exacerbated by Adderall.   Sexual health concerns- None  A comprehensive review of systems was performed and is negative other than noted above.              Psych Summary Points  01/2025: Initial transfer of care visit 01/28/25; no medication changes.              Past Psychotropic Medications     Medication Max Dose (mg) Dates / Duration Helpful? DC Reason / Adverse Effects?   Ritalin    nausea   Prozac   Y Doesn't recall why medication was changed.                 Past " Medical History     Medication allergies:    Allergies   Allergen Reactions    Sulfa Antibiotics     Tobramycin Swelling     Eyes swelled    Topamax [Topiramate]        Neurologic Hx [head injury, seizures, etc.]: Denies hx of seizure. Suspected concussion in her teens due to sports.   Patient Active Problem List   Diagnosis    KEISHA (generalized anxiety disorder)    Family history of von Willebrand disease    Family history of factor V deficiency    Low vitamin D level    Atopic rhinitis    Cysts of both ovaries    PCOS (polycystic ovarian syndrome)    Major depressive disorder, recurrent episode, moderate (H)    Attention deficit hyperactivity disorder (ADHD), unspecified ADHD type    Moderate persistent asthma without complication     Past Medical History:   Diagnosis Date    Atopic rhinitis 1/29/2008    Overview:  by history to dogs and cats    Chlamydia 2018    KEISHA (generalized anxiety disorder) 8/11/2016     Contraception- None  Pregnancy- Denies              Medications   Current Outpatient Medications   Medication Sig Dispense Refill    albuterol (PROAIR HFA/PROVENTIL HFA/VENTOLIN HFA) 108 (90 Base) MCG/ACT inhaler SHAKE LIQUID WELL AND INHALE 2 PUFFS INTO THE LUNGS EVERY 4 HOURS AS NEEDED FOR SHORTNESS OF BREATH 18 g 5    amphetamine-dextroamphetamine (ADDERALL XR) 25 MG 24 hr capsule Take 1 capsule (25 mg) by mouth every morning. 30 capsule 0    amphetamine-dextroamphetamine (ADDERALL) 10 MG tablet Take 1.5 tablets (15 mg) by mouth daily as needed. 45 tablet 0    escitalopram (LEXAPRO) 20 MG tablet Take 0.5 tablets (10 mg) by mouth daily. 45 tablet 0    hydrOXYzine (VISTARIL) 25 MG capsule TAKE 1 CAPSULE(25 MG) BY MOUTH THREE TIMES DAILY AS NEEDED FOR ANXIETY 30 capsule 5    levonorgestrel-ethinyl estradiol (NORDETTE) 0.15-30 MG-MCG tablet Take 1 tablet by mouth daily (Patient not taking: Reported on 11/5/2024) 84 tablet 3    loratadine (CLARITIN) 10 MG tablet Take 10 mg by mouth daily      SYMBICORT 80-4.5  MCG/ACT Inhaler INHALE 2 PUFFS INTO THE LUNGS TWICE DAILY 10.2 g 5                 Physical Exam  (Vitals Only)  There were no vitals taken for this visit.    Pulse Readings from Last 5 Encounters:   11/05/24 90   08/02/23 94   05/10/23 88   01/22/23 87   11/17/22 76     Wt Readings from Last 5 Encounters:   01/28/25 74.8 kg (165 lb)   11/05/24 82.6 kg (182 lb)   02/05/24 83.1 kg (183 lb 1.6 oz)   08/02/23 78 kg (172 lb)   05/10/23 75.8 kg (167 lb)     BP Readings from Last 5 Encounters:   11/05/24 134/88   02/05/24 (!) 146/89   08/02/23 130/89   05/10/23 125/85   01/22/23 116/72                 Mental Status Exam  Alertness: alert  and oriented  Appearance: adequately groomed  Behavior/Demeanor: cooperative, pleasant, and calm, with good eye contact   Speech: normal and regular rate and rhythm  Language: intact and no problems  Psychomotor: normal or unremarkable  Mood: description consistent with euthymia  Affect: full range and appropriate; was congruent to mood  Thought Process/Associations: unremarkable  Thought Content:  Reports none;  Denies suicidal ideation, violent ideation, and delusions  Perception:  Reports none;  Denies auditory hallucinations and visual hallucinations  Insight: intact  Judgment: adequate for safety and intact  Cognition: does  appear grossly intact; formal cognitive testing was not done  oriented: time, person, and place  Gait and Station: N/A - telehealth              Data       7/1/2024    11:10 AM 1/28/2025     9:13 AM 7/15/2025    12:15 PM   PROMIS-10 Total Score w/o Sub Scores   PROMIS TOTAL - SUBSCORES 22    22 22  23        Patient-reported         3/8/2023     3:51 PM   CAGE-AID Total Score   Total Score 0   Total Score MyChart 0 (A total score of 2 or greater is considered clinically significant)         7/1/2024    11:09 AM 11/5/2024     9:30 AM 1/28/2025     8:55 AM   PHQ   PHQ-9 Total Score 9 10  8    Q9: Thoughts of better off dead/self-harm past 2 weeks Not at all Not at  all Not at all       Patient-reported         2/14/2024     2:25 PM 7/1/2024    11:09 AM 1/28/2025     9:12 AM   KEISHA-7 SCORE   Total Score 12 (moderate anxiety) 13 (moderate anxiety) 11 (moderate anxiety)   Total Score 12    12 13    13 11        Patient-reported         Liver/kidney function Metabolic Blood counts   Recent Labs   Lab Test 11/05/24  1359 08/11/22  1055   CR 0.68 0.70   AST 17 14   ALT 19 25   ALKPHOS 80 62    Recent Labs   Lab Test 11/05/24  1359 09/06/23  1437 08/11/22  1055   A1C  --   --  4.9   TSH 0.97   < >  --     < > = values in this interval not displayed.    Recent Labs   Lab Test 11/05/24  1359   WBC 10.2   HGB 14.0   HCT 41.7   MCV 84           No results found for this or any previous visit.    Administrative Billing:     Level of Medical Decision Making:   - At least 2 stable chronic problems  - Engaged in prescription drug management during visit (discussed any medication benefits, side effects, alternatives, etc.)    RETURN TO REFERRING PROVIDER FINAL TREATMENT PLAN  The Psychiatric provider and/or Behavioral health clinician ([facility]) have completed consultation with the patient and are returning to referring provider care for continued care. For worsening symptoms/condition recommendations include:    Psychiatry Recommendations   -If a change is warranted, consider increasing Lexapro to 15mg daily as she felt that 20mg was too much.   -There are many other antidepressant options that could be good choices as she has only ever tried fluoxetine. Could consider an SNRI trial if a change is indicated as she would have then had two failed selective serotonin reuptake inhibitor trials. Zoloft is also a good option.     Consider pharmacologic and nonpharmacologic recommendations, if the patient has followed through on recommendations/referrals and any other helpful pertinent information (e.g., recent stressors, med adherence, enough time on the medication or high enough dose etc.)       If post consult recommendations fail, use any of the following options   Reach out to the [facility] provider through Epic staff message for guidance   Order an Adult Behavioral Health eConsult ([id number]) or Pediatric eConsult ([id number])   Refer for another [facility] consultation (after 6 months) or refer to Psychiatry/Long-term management (Mental Health Referral 9017, [facility]/Med management and Long-term management)  Psychiatry MTM Referral [9050.025] to review medications/side effects with patient

## 2025-08-25 ENCOUNTER — TELEPHONE (OUTPATIENT)
Dept: FAMILY MEDICINE | Facility: CLINIC | Age: 30
End: 2025-08-25
Payer: COMMERCIAL